# Patient Record
Sex: MALE | Race: WHITE | NOT HISPANIC OR LATINO | Employment: OTHER
[De-identification: names, ages, dates, MRNs, and addresses within clinical notes are randomized per-mention and may not be internally consistent; named-entity substitution may affect disease eponyms.]

---

## 2018-07-31 ENCOUNTER — TRANSCRIBE ORDERS (OUTPATIENT)
Dept: SCHEDULING | Age: 73
End: 2018-07-31

## 2018-07-31 DIAGNOSIS — G47.33 OBSTRUCTIVE SLEEP APNEA: Primary | ICD-10-CM

## 2018-08-28 ENCOUNTER — HOSPITAL ENCOUNTER (OUTPATIENT)
Dept: SLEEP MEDICINE | Facility: HOSPITAL | Age: 73
Discharge: HOME | End: 2018-08-28
Attending: OTOLARYNGOLOGY
Payer: MEDICARE

## 2018-08-28 DIAGNOSIS — G47.33 OBSTRUCTIVE SLEEP APNEA: ICD-10-CM

## 2018-08-28 PROCEDURE — 95810 POLYSOM 6/> YRS 4/> PARAM: CPT

## 2018-10-01 ENCOUNTER — TRANSCRIBE ORDERS (OUTPATIENT)
Dept: PULMONOLOGY | Facility: HOSPITAL | Age: 73
End: 2018-10-01

## 2018-10-01 ENCOUNTER — HOSPITAL ENCOUNTER (OUTPATIENT)
Dept: RADIOLOGY | Facility: HOSPITAL | Age: 73
Discharge: HOME | End: 2018-10-01
Attending: INTERNAL MEDICINE
Payer: MEDICARE

## 2018-10-01 DIAGNOSIS — R06.02 SHORTNESS OF BREATH: Primary | ICD-10-CM

## 2018-10-01 DIAGNOSIS — R06.02 SHORTNESS OF BREATH: ICD-10-CM

## 2018-10-01 PROCEDURE — 71046 X-RAY EXAM CHEST 2 VIEWS: CPT

## 2019-12-02 ENCOUNTER — TRANSCRIBE ORDERS (OUTPATIENT)
Dept: RADIOLOGY | Facility: HOSPITAL | Age: 74
End: 2019-12-02

## 2019-12-02 ENCOUNTER — HOSPITAL ENCOUNTER (OUTPATIENT)
Dept: RADIOLOGY | Facility: HOSPITAL | Age: 74
Discharge: HOME | End: 2019-12-02
Attending: INTERNAL MEDICINE
Payer: MEDICARE

## 2019-12-02 DIAGNOSIS — R05.9 COUGH: ICD-10-CM

## 2019-12-02 DIAGNOSIS — R05.9 COUGH: Primary | ICD-10-CM

## 2019-12-02 PROCEDURE — 71046 X-RAY EXAM CHEST 2 VIEWS: CPT

## 2020-09-23 ENCOUNTER — TELEPHONE (OUTPATIENT)
Dept: SCHEDULING | Facility: CLINIC | Age: 75
End: 2020-09-23

## 2020-09-24 NOTE — TELEPHONE ENCOUNTER
Ref by Dr Hercules, no cardiac issues , no prior cardiology, this is a cardiac eval and would like to be seen soon  Not available 9/28    Please advise

## 2020-09-29 ENCOUNTER — OFFICE VISIT (OUTPATIENT)
Dept: CARDIOLOGY | Facility: CLINIC | Age: 75
End: 2020-09-29
Payer: MEDICARE

## 2020-09-29 VITALS
HEART RATE: 80 BPM | HEIGHT: 73 IN | DIASTOLIC BLOOD PRESSURE: 78 MMHG | SYSTOLIC BLOOD PRESSURE: 134 MMHG | BODY MASS INDEX: 37.64 KG/M2 | WEIGHT: 284 LBS

## 2020-09-29 DIAGNOSIS — E78.5 HYPERLIPIDEMIA, UNSPECIFIED HYPERLIPIDEMIA TYPE: ICD-10-CM

## 2020-09-29 DIAGNOSIS — G47.33 OSA ON CPAP: ICD-10-CM

## 2020-09-29 DIAGNOSIS — R73.03 PREDIABETES: ICD-10-CM

## 2020-09-29 DIAGNOSIS — E66.812 CLASS 2 OBESITY WITHOUT SERIOUS COMORBIDITY WITH BODY MASS INDEX (BMI) OF 37.0 TO 37.9 IN ADULT, UNSPECIFIED OBESITY TYPE: ICD-10-CM

## 2020-09-29 DIAGNOSIS — Z91.89 AT RISK FOR CORONARY ARTERY DISEASE: Primary | ICD-10-CM

## 2020-09-29 PROBLEM — Z71.89 ENCOUNTER FOR CARDIAC RISK COUNSELING: Status: ACTIVE | Noted: 2020-09-29

## 2020-09-29 PROCEDURE — 93000 ELECTROCARDIOGRAM COMPLETE: CPT | Performed by: INTERNAL MEDICINE

## 2020-09-29 PROCEDURE — 99204 OFFICE O/P NEW MOD 45 MIN: CPT | Performed by: INTERNAL MEDICINE

## 2020-09-29 RX ORDER — ASPIRIN 81 MG/1
81 TABLET ORAL DAILY
Qty: 90 TABLET | Refills: 3
Start: 2020-09-29 | End: 2021-12-15 | Stop reason: ALTCHOICE

## 2020-09-29 RX ORDER — FLUTICASONE FUROATE AND VILANTEROL TRIFENATATE 200; 25 UG/1; UG/1
1 POWDER RESPIRATORY (INHALATION) AS NEEDED
COMMUNITY
Start: 2020-09-22

## 2020-09-29 RX ORDER — METFORMIN HYDROCHLORIDE 500 MG/1
TABLET ORAL
COMMUNITY
Start: 2020-08-30 | End: 2021-02-04 | Stop reason: SDUPTHER

## 2020-09-29 RX ORDER — MELOXICAM 7.5 MG/1
7.5 TABLET ORAL
COMMUNITY
Start: 2020-09-21 | End: 2020-12-30 | Stop reason: DRUGHIGH

## 2020-09-29 RX ORDER — ALBUTEROL SULFATE 90 UG/1
INHALANT RESPIRATORY (INHALATION)
COMMUNITY
Start: 2020-09-22

## 2020-09-29 RX ORDER — ATORVASTATIN CALCIUM 20 MG/1
20 TABLET, FILM COATED ORAL
COMMUNITY
Start: 2020-09-18

## 2020-09-29 RX ORDER — TRAZODONE HYDROCHLORIDE 50 MG/1
TABLET ORAL AS NEEDED
COMMUNITY
Start: 2020-09-22 | End: 2022-10-27

## 2020-09-29 ASSESSMENT — ENCOUNTER SYMPTOMS
SORE THROAT: 0
VOMITING: 0
HEARTBURN: 0
SNORING: 0
PALPITATIONS: 0
WEIGHT GAIN: 1
FREQUENCY: 0
ABDOMINAL PAIN: 0
ORTHOPNEA: 0
DIZZINESS: 0
BLOATING: 0
PND: 0
NERVOUS/ANXIOUS: 0
BLURRED VISION: 0
COUGH: 0
DEPRESSION: 0
MYALGIAS: 0
NAUSEA: 0
SYNCOPE: 0
MEMORY LOSS: 0
BACK PAIN: 0

## 2020-09-29 NOTE — LETTER
September 29, 2020     Kamran Wilson MD  [address]  [place]  [facility] [address]    Patient: Sudheer Mayorga  YOB: 1945  Date of Visit: 9/29/2020      Dear Dr. Wilson:    Thank you for referring Sudheer Mayorga to me for evaluation. Below are my notes for this consultation.    If you have questions, please do not hesitate to call me. I look forward to following your patient along with you.         Sincerely,        Declan Gray MD        CC: MD Isaac Horan, Declan BEAN MD  9/29/2020 11:55 PM  Sign when Signing Visit     Cardiology Note          HPI   Sudheer Mayorga is a 75 y.o. male who presents for cardiovascular assessment.    He is a pleasant 75 y.o. with a history of prediabetes, dyslipidemia, sleep apnea on CPAP and morbid obesity who wishes to establish care and advice on reducing cardiovascular risk.  At the present time he does not formally exercise.  He does tell me that when he has occasion to walk a few blocks or up a flight of stairs he can do so without any dyspnea.  He does not get chest pain with exertion.  He tells me he is battled weight loss his adult life.  He is tried numerous diets with only modest success before he stops.  He had recently got up to 295 pounds and improved his eating to come down to 285 pounds at present.  His weight has fluctuated between 275 and 295 pounds in recent years.  He has considered bariatric surgery and asked my opinion on this.  We discussed in all out effort short of this prior to considering but that could potentially be an option even at his age.  He has chronic tendinitis of his right knee but feels it would not prevent him from doing a daily walking program.  He also recognizes it may improve from losing weight.  Denies chest pain, shortness of breath at rest, palpitations, orthopnea, paroxysmal nocturnal dyspnea, presyncope, syncope.      Past Medical History:   Diagnosis Date   • Allergic rhinitis    • Asthma    •  Depression    • Knee tendinitis     right   • Lipid disorder    • HONEY on CPAP    • Prediabetes      Past Surgical History:   Procedure Laterality Date   • HERNIA REPAIR     • ROTATOR CUFF REPAIR         SOCIAL HISTORY   reports that he has never smoked. He has never used smokeless tobacco. He reports current alcohol use. He reports that he does not use drugs.   4 daughters ages 38-44 as of . .    Family Status   Relation Name Status   • Bio Mother   at age 91        CVA at 88   • Bio Father   at age 92        DM onset 81yo        ALLERGIES  Patient has no known allergies.      Outpatient Encounter Medications as of 2020:   •  albuterol HFA (VENTOLIN HFA) 90 mcg/actuation inhaler, INHALE 1 PUFF BY MOUTH EVERY 4 HOURS AS NEEDED  •  atorvastatin (LIPITOR) 20 mg tablet, Take 20 mg by mouth once daily.  •  BREO ELLIPTA 200-25 mcg/dose blister with device powder for inhalation, as needed.    •  meloxicam (MOBIC) 7.5 mg tablet, Take 7.5 mg by mouth once daily.  •  metFORMIN (GLUCOPHAGE) 500 mg tablet, TAKE 1 TABLET BY MOUTH EVERY MORNING BEFORE BREAKFAST.  •  traZODone (DESYREL) 50 mg tablet, Take by mouth as needed.    •  aspirin (ASPIR-81) 81 mg enteric coated tablet, Take 1 tablet (81 mg total) by mouth daily.    Review of Systems   Constitution: Positive for weight gain (5 pounds).   HENT: Negative for hearing loss and sore throat.    Eyes: Negative for blurred vision.   Cardiovascular: Negative for chest pain, orthopnea, palpitations, paroxysmal nocturnal dyspnea and syncope.   Respiratory: Negative for cough and snoring.    Endocrine: Negative for polyuria.   Skin: Negative for rash.   Musculoskeletal: Negative for arthritis, back pain, joint pain and myalgias.   Gastrointestinal: Negative for bloating, abdominal pain, heartburn, nausea and vomiting.   Genitourinary: Negative for frequency.   Neurological: Negative for dizziness.   Psychiatric/Behavioral: Negative for depression and  "memory loss. The patient is not nervous/anxious.      Objective   Visit Vitals  /78   Pulse 80   Ht 1.854 m (6' 1\")   Wt 129 kg (284 lb)   BMI 37.47 kg/m²       Wt Readings from Last 3 Encounters:   09/29/20 129 kg (284 lb)       Physical Exam   Constitutional: He is oriented to person, place, and time. He appears well-developed.   HENT:   Head: Normocephalic and atraumatic.   Eyes: EOM are normal. No scleral icterus.   Neck: No JVD present.   Cardiovascular: Normal rate, regular rhythm and normal heart sounds.   Pulmonary/Chest: Breath sounds normal.   Abdominal: Soft. He exhibits no distension. There is no abdominal tenderness.   Musculoskeletal: Normal range of motion.         General: No edema.   Neurological: He is alert and oriented to person, place, and time.   Skin: Skin is warm and dry.   Psychiatric: He has a normal mood and affect. Judgment normal.   Vitals reviewed.      No results found for: GLUCOSE, CALCIUM, NA, K, CO2, CL, BUN, CREATININE  No results found for: ALT, AST, GGT, ALKPHOS, BILITOT  No results found for: WBC, HGB, HCT, MCV, PLT  No results found for: TSH  No results found for: CHOL  No results found for: HDL  No results found for: LDLCALC  No results found for: TRIG  No results found for: CHOLHDL  No results found for: HGBA1C    Labs 5/26/2020:  Chol 141, HDL 42, trig 79, LDL 83    Labs 9/1/2020:  BUN 18, creat 0.92, na 141, k4.3, normal LFTs. TSH 1.56. BqpN6s=1.9.      EKG    Performed on 09/29/2020 and personally reviewed:  Sinus  Rhythm at 84bpm   -Left axis.     Imaging/Testing    PFTs March 2020:  Mild restriction    Sleep Study 2018  AHI 72.    ASSESSMENT AND PLAN    1. Cardiovascular exam. His cardiovascular exam including heart and carotids are normal today. His EKG shows minor abnormalities of left axis deviation and borderline low voltage common in obesity. His lipids are acceptable on Atorvastatin as is his A1C on Metformin. We discussed weight loss would go a long way in " reducing his risk factors for CV disease. I encouraged starting a regular exercise program. Due to good activity tolerance without CV symptoms I have not ordered stress testing for now.    2. Obesity. We discussed how much of his medical issues would be eliminated or improved with weight loss including his prediabetes, dyslipidemia and sleep apnea. He has tried multiple diets and only lost modest amounts before stopping and gaining the weight back. His weight has generally been 275-295 pounds in the last several years. I discussed referral to a physician specializing in obesity and he is agreeable. I therefore referred him to Dr. Garcia. In the meantime we discussed 30 minutes of moderate exercise daily in the form of walking and dietary changes for the interim.    3. Dyslipidemia. Well controlled on Atorvatstatin 20 mg Daily.    4. HONEY. Reports AHI went from 72 to 7 with treatment for which he remains compliant with. Follows with Dr. Hercules.    5. Prediabtes. Controlled with Metformin. We discussed weight loss.        Thank you for allowing me to participate in the care of this patient. I will plan to see him in 1 year.  Please do not hesitate to contact me with any questions or concerns.    Sincerely,  Declan Gray MD  9/29/2020

## 2020-09-29 NOTE — PROGRESS NOTES
Cardiology Note          HPI   Sudheer Mayorga is a 75 y.o. male who presents for cardiovascular assessment.    He is a pleasant 75 y.o. with a history of prediabetes, dyslipidemia, sleep apnea on CPAP and morbid obesity who wishes to establish care and advice on reducing cardiovascular risk.  At the present time he does not formally exercise.  He does tell me that when he has occasion to walk a few blocks or up a flight of stairs he can do so without any dyspnea.  He does not get chest pain with exertion.  He tells me he is battled weight loss his adult life.  He is tried numerous diets with only modest success before he stops.  He had recently got up to 295 pounds and improved his eating to come down to 285 pounds at present.  His weight has fluctuated between 275 and 295 pounds in recent years.  He has considered bariatric surgery and asked my opinion on this.  We discussed in all out effort short of this prior to considering but that could potentially be an option even at his age.  He has chronic tendinitis of his right knee but feels it would not prevent him from doing a daily walking program.  He also recognizes it may improve from losing weight.  Denies chest pain, shortness of breath at rest, palpitations, orthopnea, paroxysmal nocturnal dyspnea, presyncope, syncope.      Past Medical History:   Diagnosis Date   • Allergic rhinitis    • Asthma    • Depression    • Knee tendinitis     right   • Lipid disorder    • HONEY on CPAP    • Prediabetes      Past Surgical History:   Procedure Laterality Date   • HERNIA REPAIR     • ROTATOR CUFF REPAIR         SOCIAL HISTORY   reports that he has never smoked. He has never used smokeless tobacco. He reports current alcohol use. He reports that he does not use drugs.   4 daughters ages 38-44 as of 2020. .    Family Status   Relation Name Status   • Bio Mother   at age 91        CVA at 88   • Bio Father   at age 92        DM onset 81yo     "    ALLERGIES  Patient has no known allergies.      Outpatient Encounter Medications as of 9/29/2020:   •  albuterol HFA (VENTOLIN HFA) 90 mcg/actuation inhaler, INHALE 1 PUFF BY MOUTH EVERY 4 HOURS AS NEEDED  •  atorvastatin (LIPITOR) 20 mg tablet, Take 20 mg by mouth once daily.  •  BREO ELLIPTA 200-25 mcg/dose blister with device powder for inhalation, as needed.    •  meloxicam (MOBIC) 7.5 mg tablet, Take 7.5 mg by mouth once daily.  •  metFORMIN (GLUCOPHAGE) 500 mg tablet, TAKE 1 TABLET BY MOUTH EVERY MORNING BEFORE BREAKFAST.  •  traZODone (DESYREL) 50 mg tablet, Take by mouth as needed.    •  aspirin (ASPIR-81) 81 mg enteric coated tablet, Take 1 tablet (81 mg total) by mouth daily.    Review of Systems   Constitution: Positive for weight gain (5 pounds).   HENT: Negative for hearing loss and sore throat.    Eyes: Negative for blurred vision.   Cardiovascular: Negative for chest pain, orthopnea, palpitations, paroxysmal nocturnal dyspnea and syncope.   Respiratory: Negative for cough and snoring.    Endocrine: Negative for polyuria.   Skin: Negative for rash.   Musculoskeletal: Negative for arthritis, back pain, joint pain and myalgias.   Gastrointestinal: Negative for bloating, abdominal pain, heartburn, nausea and vomiting.   Genitourinary: Negative for frequency.   Neurological: Negative for dizziness.   Psychiatric/Behavioral: Negative for depression and memory loss. The patient is not nervous/anxious.      Objective   Visit Vitals  /78   Pulse 80   Ht 1.854 m (6' 1\")   Wt 129 kg (284 lb)   BMI 37.47 kg/m²       Wt Readings from Last 3 Encounters:   09/29/20 129 kg (284 lb)       Physical Exam   Constitutional: He is oriented to person, place, and time. He appears well-developed.   HENT:   Head: Normocephalic and atraumatic.   Eyes: EOM are normal. No scleral icterus.   Neck: No JVD present.   Cardiovascular: Normal rate, regular rhythm and normal heart sounds.   Pulmonary/Chest: Breath sounds " normal.   Abdominal: Soft. He exhibits no distension. There is no abdominal tenderness.   Musculoskeletal: Normal range of motion.         General: No edema.   Neurological: He is alert and oriented to person, place, and time.   Skin: Skin is warm and dry.   Psychiatric: He has a normal mood and affect. Judgment normal.   Vitals reviewed.      No results found for: GLUCOSE, CALCIUM, NA, K, CO2, CL, BUN, CREATININE  No results found for: ALT, AST, GGT, ALKPHOS, BILITOT  No results found for: WBC, HGB, HCT, MCV, PLT  No results found for: TSH  No results found for: CHOL  No results found for: HDL  No results found for: LDLCALC  No results found for: TRIG  No results found for: CHOLHDL  No results found for: HGBA1C    Labs 5/26/2020:  Chol 141, HDL 42, trig 79, LDL 83    Labs 9/1/2020:  BUN 18, creat 0.92, na 141, k4.3, normal LFTs. TSH 1.56. BdsS4z=2.9.      EKG    Performed on 09/29/2020 and personally reviewed:  Sinus  Rhythm at 84bpm   -Left axis.     Imaging/Testing    PFTs March 2020:  Mild restriction    Sleep Study 2018  AHI 72.    ASSESSMENT AND PLAN    1. Cardiovascular exam. His cardiovascular exam including heart and carotids are normal today. His EKG shows minor abnormalities of left axis deviation and borderline low voltage common in obesity. His lipids are acceptable on Atorvastatin as is his A1C on Metformin. We discussed weight loss would go a long way in reducing his risk factors for CV disease. I encouraged starting a regular exercise program. Due to good activity tolerance without CV symptoms I have not ordered stress testing for now.    2. Obesity. We discussed how much of his medical issues would be eliminated or improved with weight loss including his prediabetes, dyslipidemia and sleep apnea. He has tried multiple diets and only lost modest amounts before stopping and gaining the weight back. His weight has generally been 275-295 pounds in the last several years. I discussed referral to a  physician specializing in obesity and he is agreeable. I therefore referred him to Dr. Garcia. In the meantime we discussed 30 minutes of moderate exercise daily in the form of walking and dietary changes for the interim.    3. Dyslipidemia. Well controlled on Atorvatstatin 20 mg Daily.    4. HONEY. Reports AHI went from 72 to 7 with treatment for which he remains compliant with. Follows with Dr. Hercules.    5. Prediabtes. Controlled with Metformin. We discussed weight loss.        Thank you for allowing me to participate in the care of this patient. I will plan to see him in 1 year.  Please do not hesitate to contact me with any questions or concerns.    Sincerely,  Declan Gray MD  9/29/2020

## 2020-10-16 ENCOUNTER — OFFICE VISIT (OUTPATIENT)
Dept: BARIATRICS/WEIGHT MGMT | Facility: CLINIC | Age: 75
End: 2020-10-16
Payer: MEDICARE

## 2020-10-16 VITALS
DIASTOLIC BLOOD PRESSURE: 80 MMHG | WEIGHT: 287 LBS | BODY MASS INDEX: 38.04 KG/M2 | SYSTOLIC BLOOD PRESSURE: 142 MMHG | RESPIRATION RATE: 16 BRPM | HEIGHT: 73 IN | OXYGEN SATURATION: 95 % | HEART RATE: 72 BPM

## 2020-10-16 DIAGNOSIS — E66.01 CLASS 2 SEVERE OBESITY WITH SERIOUS COMORBIDITY AND BODY MASS INDEX (BMI) OF 38.0 TO 38.9 IN ADULT, UNSPECIFIED OBESITY TYPE (CMS/HCC): ICD-10-CM

## 2020-10-16 DIAGNOSIS — E78.2 MIXED HYPERLIPIDEMIA: ICD-10-CM

## 2020-10-16 DIAGNOSIS — E66.812 CLASS 2 SEVERE OBESITY WITH SERIOUS COMORBIDITY AND BODY MASS INDEX (BMI) OF 38.0 TO 38.9 IN ADULT, UNSPECIFIED OBESITY TYPE (CMS/HCC): ICD-10-CM

## 2020-10-16 DIAGNOSIS — G47.33 SLEEP APNEA, OBSTRUCTIVE: ICD-10-CM

## 2020-10-16 DIAGNOSIS — R73.03 PREDIABETES: Primary | ICD-10-CM

## 2020-10-16 PROCEDURE — 99205 OFFICE O/P NEW HI 60 MIN: CPT | Performed by: FAMILY MEDICINE

## 2020-10-16 RX ORDER — NALTREXONE HYDROCHLORIDE AND BUPROPION HYDROCHLORIDE 8; 90 MG/1; MG/1
TABLET, EXTENDED RELEASE ORAL
Qty: 70 TABLET | Refills: 0 | Status: SHIPPED | OUTPATIENT
Start: 2020-10-16 | End: 2020-10-16 | Stop reason: CLARIF

## 2020-10-16 RX ORDER — UBIDECARENONE 100 MG
100 CAPSULE ORAL DAILY
COMMUNITY

## 2020-10-16 RX ORDER — BUPROPION HYDROCHLORIDE 150 MG/1
150 TABLET ORAL DAILY
Qty: 90 TABLET | Refills: 0 | Status: SHIPPED | OUTPATIENT
Start: 2020-10-16 | End: 2021-01-08

## 2020-10-16 RX ORDER — NALTREXONE HYDROCHLORIDE 50 MG/1
50 TABLET, FILM COATED ORAL DAILY
Qty: 90 TABLET | Refills: 0 | Status: SHIPPED | OUTPATIENT
Start: 2020-10-16 | End: 2020-10-26 | Stop reason: DRUGHIGH

## 2020-10-16 ASSESSMENT — ENCOUNTER SYMPTOMS
WEAKNESS: 0
HYPERACTIVE: 0
VOICE CHANGE: 0
PALPITATIONS: 0
APPETITE CHANGE: 0
ARTHRALGIAS: 0
BLOOD IN STOOL: 0
DIARRHEA: 0
ABDOMINAL DISTENTION: 0
NUMBNESS: 0
ADENOPATHY: 0
POLYPHAGIA: 0
UNEXPECTED WEIGHT CHANGE: 0
APNEA: 0
BACK PAIN: 0
NAUSEA: 0
FATIGUE: 1
TREMORS: 0
DYSPHORIC MOOD: 0
HEMATURIA: 0
DIFFICULTY URINATING: 0
WHEEZING: 0
BRUISES/BLEEDS EASILY: 0
CONSTIPATION: 0
FREQUENCY: 0
SLEEP DISTURBANCE: 0
NERVOUS/ANXIOUS: 0
FLANK PAIN: 0
CHEST TIGHTNESS: 0
POLYDIPSIA: 0
DECREASED CONCENTRATION: 0
ALLERGIC/IMMUNOLOGIC COMMENTS: DENIES FREQUENT INFECTIONS, HISTORY OF MAJOR INFECTIONS OR FREQUENT ANTIBIOTIC USE
JOINT SWELLING: 0
COLOR CHANGE: 0
ACTIVITY CHANGE: 1
HEADACHES: 0
COUGH: 0
EYE PAIN: 0
TROUBLE SWALLOWING: 0
VOMITING: 0
SEIZURES: 0
MYALGIAS: 0
PHOTOPHOBIA: 0
DIZZINESS: 0
WOUND: 0
SHORTNESS OF BREATH: 0
LIGHT-HEADEDNESS: 0
SPEECH DIFFICULTY: 0
CHOKING: 0
ABDOMINAL PAIN: 0

## 2020-10-16 NOTE — PATIENT INSTRUCTIONS
Problem List Items Addressed This Visit        Respiratory    Sleep apnea, obstructive     There is a  cyclical relationship between sleep apnea and excess body weight.  Excess body weight often contributes to sleep apnea and a 10% body weight decrease can result in 30% less apneic episodes. Untreated sleep apnea and its resulting sleep deprived state can be extremely physically stressful and can lead to hypertension, atrial fibrillation, and mood disorders. This stress can also worsen insulin resistance and make weight loss more difficult.  It is very important to sleep well, and I reinforced that is very important to follow outlined medical treatments.               Endocrine/Metabolic    Prediabetes - Primary     Continue on metformin at current dose.    We discussed today that without change in lifestyle prediabetes will progress to diabetes over time.  Continuing to have the same eating plan, level of nutrition, and lifestyle will not result in stable blood sugars over time.  Doing the same thing will result in worsening of the condition.  The only way to stop progression or produce remission of prediabetes is to eat a whole foods based diet, low in simple carbohydrates, get adequate physical activity, manage stress, and eat only in response to hunger.  Certain medications are also available to help with this process.           Mixed hyperlipidemia     Continue on current dose of atorvastatin.    Elevated cholesterol levels often come from a blend of genetic and environmental factors.  Maximizing nutrition and physical activity is an important adjunct to medical management to treat elevated cholesterol levels and reduce the risk of arthrosclerotic disease such as heart attacks, strokes, and peripheral arterial disease.           Class 2 severe obesity with serious comorbidity and body mass index (BMI) of 38.0 to 38.9 in adult (CMS/[facility])     Weight loss through a multifaceted approach addressing metabolic  "factors, nutrition, physical activity, and mental well being will help this patient improve or resolve the following conditions related to mechanical forces of weight: Joint pain and sleep apnea, as well as the following conditions related to metabolic and inflammatory processes of excess and dysfunctional adipose tissue or \"adiposopathy\": Prediabetes and hyperlipidemia.     Understand that weight loss through sustainable changes will probably not provide quick results, but will provide lasting results.  Regaining rapidly lost weight is more damaging than never loosing it at all.      Today we discussed that weight balance is a complex process with many possible risk factors that far exceed the traditional concept of caloric balance. We identified your specific risk factors, and began a  plan to make sustainable life changes.  Lasting change come from new habit formation.  This takes time and consistent effort.      After today's visit, we determined that contributing factors to the above weight related conditions include:    Insulin (prediabetes), cortisol (stress with insufficient relaxation), obstructive sleep apnea, inflammation (asthma history), food portions, food type, artificial sweeteners, insufficient water intake, nonhunger eating (quickly, finisher, emotional, procrastinator, refuse not, distracted), constant hunger, food cravings, probable binge eating    We discussed targets for appropriate nutrition, including food timing, amount, and type.    Food timing: 3 meals and 2 snacks    Food types: Whole unprocessed or minimally processed foods.    Long-term target  Food Quantity goals in 1 day:  4+ non-starchy (low glycemic index) vegetables    3 Moderate glycemic index fruit or vegetable choices   2 Higher glycemic index food choices   6 Protein choices   6 Fat choices    1 off plan items per week    Food Teamwork: Always pair a Moderate or High Glycemic Load choice with a Healthy fat --- No naked " carbs!    Today we outlined the following beginning steps to put goals into action:    Take some decision making out of the picture through using a blended program of Nutrisystem with a well-balanced home-cooked meal for dinner.  He will use Nutrisystem for breakfast and lunch as well as a snack during the day and 1 after dinner.  He will eat a balanced dinner consisting of 2 protein choices, 2 fat choices, 1 starch choice, and to vegetable choices.    We discussed Contrave and he agreed to a trial of medical treatment, however his insurance would not cover this medication.  We then decided to use the medication broken apart into Wellbutrin 150 mg in the morning and naltrexone one half tab in the afternoon.    Patient will initiate Wellbutrin in the morning as it can be activating and interfere with sleep if taken later in the day.  Most common side effects of Wellbutrin include mild headache, mild nausea, and feeling of jitteriness.  Oftentimes the side effects will resolve within the first week, and if mild, please continue the medication as the symptoms will likely go away.  If you notice anything more severe please contact the office for instruction.    Naltrexone is generally well-tolerated.  Needs to be taken as a half tab once daily, generally at lunchtime.  You must avoid alcohol, opiates and excessive sugar 4-5 days prior to initiating the dose or you can feel symptoms of withdrawal such as nausea, diarrhea, headache, sweats.      Supplement recommendations:     Probiotics: Consume foods with probiotics or take a probiotic daily (like [employer]'s Adult Probiotic, VSL-3, Florastor, or Culturelle).   Omega-3: Eat 2 servings of fish per week or take a good quality omega -3 supplement (TARGET BRAZIL - sold here, [place] [employer], [place])  Vitamin D 3: 2,000 IU daily (or more if deficient) with a meal containing fat   Others: New with co-Q10, Osteo Bi-Flex, magnesium, and multivitamin.    Initiate and maintain food  journal: Topic Handout provided today.    Bring your food journal to every visit.  We will adjust or continue your plan based on review of this data.      Create a list of advantages that will result from making lifestyle changes and a list of obstacles that may present challenges along the way.     Commit to follow up --- loose, gain or stay the same.  The only way to fail is to quit.  Change will happen if you continue to manage and modify your plan.  We are here to help you do that no matter what happens in the interim.     TEN RULES for living a healthy life and maintaining a healthy weight:       Eat only in response to hunger, and stop when you are no longer hungry.  Recognize cues and emotions that lead to non-hunger eating, and manage them accordingly.     Drink unsweetened beverages and aim to get 6-8 glasses of water daily.                             Avoid regular use of ultra-processed foods, added nitrates or nitrites, sweetened beverages, and artifical sweeteners (sucralose, splenda, equal, sweet-n-low), as they dramatically increase insulin and  worsen insulin resistance. Insulin resistance is a the most common risk factor for weight dysregulation in the [place].     Avoid unnecessary antibiotics in your own self and in the meats and dairy products you use.    Limit alcohol to less than 3 servings in a week.    Achieve adequate NEAT (Non-Exercise Activity Thermogenesis) every day: hourly movement  while awake for at least 250 steps (approximately 5 minutes out of each hour).  Hourly movement prevents metabolic slow down that can happen with prolonged sitting.     Participate in regular dedicated physical activity  --- at least 150 minutes per week during active weight loss and increasing to 300 minutes per week in the weight maintenance phase.  Make at least 60 of those physical activity minutes resistance training.     Develop mindful eating practices : Eat sitting down, preferably at a table.   Eat your food in a reflective, peaceful setting or in enjoyable company, not in front of a screen, behind the wheel of a car or in the grocery store aisle.   Chew Slowly and thoroughly -- chewing the 1st stage of digestion!     Identify personal stressors.  Modify what you can, and aim to balance what you cannot. Develop and practice daily relaxation techniques to balance stress.  Make time for rejuvenating activities and hobbies at least weekly.     Get adequate and restful sleep every night ---  7.5-8.5 hours a night is what most people require.  Consider a scheduled sleep and wake time if possible.       In Conclusion:     Our plan of action includes frequent visits over the next year to focus on education regarding the appropriate nutrition and physical activity best suited to our patient's individual needs, SMART goal setting and other strategies for lifestyle change, mindfulness, stress reduction,  accountability, and review of any initiated medical interventions.  Visits will space out over time with formation of new life habits and stabilization of the treatment plan. We will enlist the services of the nutritionist, and may in the future consult with exercise physiology or emotional wellness.  Weekly educational classes are also available for extra support, if needed.    Over half of today's 60 minute visit was spent in education and counseling regarding  nutrition, physical activity, metabolism, behavioral modifications, and weight loss goals and benefits as an adjunctive treatment to traditional medications and medical practices for treatment of chronic diseases related to excess weight.

## 2020-10-16 NOTE — ASSESSMENT & PLAN NOTE
Continue on metformin at current dose.    We discussed today that without change in lifestyle prediabetes will progress to diabetes over time.  Continuing to have the same eating plan, level of nutrition, and lifestyle will not result in stable blood sugars over time.  Doing the same thing will result in worsening of the condition.  The only way to stop progression or produce remission of prediabetes is to eat a whole foods based diet, low in simple carbohydrates, get adequate physical activity, manage stress, and eat only in response to hunger.  Certain medications are also available to help with this process.

## 2020-10-16 NOTE — PROGRESS NOTES
I spoke with Mr. Mayorga and explained that Contrave is not covered by his insurance. He would like to take the Wellbutrin and Naltrexone separately. I explained the indications and side effects of both medications to him. I asked him to start with the Wellbutrin for a few days and then add the naltrexone but he wants to start both tomorrow. He also does not want to split the naltrexone in half. He was instructed to call me with any side effects.

## 2020-10-16 NOTE — PROGRESS NOTES
DETAILS OF CONSULTATION     Consulting Service:  [facility]    Referring Physician: No ref. provider found    Reason for Consultation:   Chief Complaint   Patient presents with   • Weight Loss        HISTORY OF PRESENT ILLNESS        Sudheer Mayorga is a 75 y.o. male with prediabetes, asthma, and sleep apnea here to discuss how weight loss through optimized nutrition, physical activity, and behavior modification can improve weight related conditions.      Sudheer Mayorga identifies a personal healthy weight as 250 pounds.     Motivation for appointment: His weight is starting to affect his health.  His daughters will not let him get bypass surgery.    Status of weight related conditions, relevant history, and new concerns:    Prediabetes: Patient is currently on Metformin, his last A1c was 5.9.  He eats a diet very high in simple carbohydrates including bananas, fried matza, breads and buns, and breaded meats.    Sleep apnea: Patient reports that he uses his CPAP machine nightly.  He sleeps between 5 to 6 hours a night.  He reports that sleep is restful    Knee pain: Patient reports the knee pain prevents him from exercising regularly.    Hyperlipidemia: Currently taking atorvastatin daily.  He takes his medication in the morning.    Potentially weight positive medications: None    Lifetime weight trends:     Onset of difficulty with weight regulation: He reports having difficulty with his weight since  when he was approximately 42 years old.  He reports that at some point he reached 300 pounds.  At the age of 62 he became a  and lost 100 pounds after his wife .  He is slowly regained back up to his 295 pounds status.  He used Nutrisystem in 2019 and lost 25 pounds.  He stopped doing the program because it was hard to stick with because he would go out to dinner frequently and not want to eat the Nutrisystem dinners.    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    Nutrition: Burt eats 3 meals a day and has 1 snack daily  24 hour food recall:  Breakfast: Fried matza  Lunch: 2 hotdogs and buns  Dinner: Chicken Parmesan  Snacks: 1 banana  Beverages: Lots of diet Snapple    Physical activity: Sedentary work and no dedicated physical activity    Sleep and stress: Moderate stress, insufficient sleep, uses CPAP nightly.     I have reviewed the patient's past medical and surgical history, family history, psychiatric, and social history.     Pertinent negative history:   Patient denies history of anorexia, bulimia, addiction, ADHD, seizures, pancreatitis,  Coronary Artery Disease, valvular disease, cardiomyopathy, arrhythmias, chronic renal disease, nephrolithiasis, disorders of electrolyte imbalance    Pertinent positive history not already mentioned: none    See additional details from intake questionnaire in scanned documents under the media tab.     PAST MEDICAL AND SURGICAL HISTORY        Past Medical History:   Diagnosis Date   • Allergic rhinitis    • Asthma    • Depression    • Knee tendinitis     right   • Lipid disorder    • HONEY on CPAP    • Prediabetes        Past Surgical History:   Procedure Laterality Date   • HERNIA REPAIR     • ROTATOR CUFF REPAIR         PCP: Kamran Wilson MD    MEDICATIONS          Current Outpatient Medications:   •  albuterol HFA (VENTOLIN HFA) 90 mcg/actuation inhaler, INHALE 1 PUFF BY MOUTH EVERY 4 HOURS AS NEEDED, Disp: , Rfl:   •  aspirin (ASPIR-81) 81 mg enteric coated tablet, Take 1 tablet (81 mg total) by mouth daily., Disp: 90 tablet, Rfl: 3  •  atorvastatin (LIPITOR) 20 mg tablet, Take 20 mg by mouth once daily., Disp: , Rfl:   •  BREO ELLIPTA 200-25 mcg/dose blister with device powder for inhalation, as needed.  , Disp: , Rfl:   •  coenzyme Q10 (COQ10) 100 mg capsule, Take 100 mg by  mouth daily., Disp: , Rfl:   •  meloxicam (MOBIC) 7.5 mg tablet, Take 7.5 mg by mouth once daily., Disp: , Rfl:   •  metFORMIN (GLUCOPHAGE) 500 mg tablet, TAKE 1 TABLET BY MOUTH EVERY MORNING BEFORE BREAKFAST., Disp: , Rfl:   •  multivit-min/folic/vit K/lycop (ONE-A-DAY MEN'S MULTIVITAMIN ORAL), Take by mouth., Disp: , Rfl:   •  traZODone (DESYREL) 50 mg tablet, Take by mouth as needed.  , Disp: , Rfl:   •  buPROPion XL (WELLBUTRIN XL) 150 mg 24 hr tablet, Take 1 tablet (150 mg total) by mouth daily., Disp: 90 tablet, Rfl: 0  •  naltrexone (DEPADE) 50 mg tablet, Take 1 tablet (50 mg total) by mouth daily. Take at lunchtime, Disp: 90 tablet, Rfl: 0    ALLERGIES        Patient has no known allergies.    FAMILY HISTORY        Family History   Problem Relation Age of Onset   • Stroke Biological Mother    • Diabetes Biological Father        SOCIAL/ TOBACCO HISTORY        Social History     Tobacco Use   • Smoking status: Never Smoker   • Smokeless tobacco: Never Used   Substance Use Topics   • Alcohol use: Yes     Comment: social   • Drug use: Never     Social History     Social History Narrative    Live with partner.       REVIEW OF SYSTEMS      Review of Systems    Review of Systems   Constitutional: Positive for activity change and fatigue. Negative for appetite change and unexpected weight change.   HENT: Negative for dental problem, ear pain, hearing loss, mouth sores, trouble swallowing and voice change.    Eyes: Negative for photophobia, pain and visual disturbance.   Respiratory: Negative for apnea, cough, choking, chest tightness, shortness of breath and wheezing.    Cardiovascular: Negative for chest pain, palpitations and leg swelling.   Gastrointestinal: Negative for abdominal distention, abdominal pain, blood in stool, constipation, diarrhea, nausea and vomiting.   Endocrine: Negative for cold intolerance, heat intolerance, polydipsia, polyphagia and polyuria.   Genitourinary: Negative for difficulty  "urinating, flank pain, frequency, hematuria and urgency.   Musculoskeletal: Negative for arthralgias, back pain, gait problem, joint swelling and myalgias.   Skin: Negative for color change, rash and wound.   Allergic/Immunologic: Negative for environmental allergies, food allergies and immunocompromised state.        Denies frequent infections, history of major infections or frequent antibiotic use   Neurological: Negative for dizziness, tremors, seizures, syncope, speech difficulty, weakness, light-headedness, numbness and headaches.   Hematological: Negative for adenopathy. Does not bruise/bleed easily.   Psychiatric/Behavioral: Negative for decreased concentration, dysphoric mood and sleep disturbance. The patient is not nervous/anxious and is not hyperactive.         PHYSICAL EXAMINATION      Visit Vitals  BP (!) 142/80 (BP Location: Left upper arm, Patient Position: Sitting)   Pulse 72   Resp 16   Ht 1.842 m (6' 0.5\")   Wt 130 kg (287 lb)   SpO2 95%   BMI 38.39 kg/m²        Physical Exam  Constitutional:       General: He is not in acute distress.     Appearance: He is well-developed. He is not diaphoretic.   HENT:      Head: Normocephalic and atraumatic.      Right Ear: External ear normal.      Left Ear: External ear normal.      Nose: Nose normal.   Eyes:      Conjunctiva/sclera: Conjunctivae normal.      Pupils: Pupils are equal, round, and reactive to light.   Neck:      Musculoskeletal: Normal range of motion.   Pulmonary:      Effort: Pulmonary effort is normal. No respiratory distress.      Breath sounds: No stridor.   Skin:     General: Skin is dry.      Coloration: Skin is not pale.   Neurological:      Mental Status: He is alert and oriented to person, place, and time.      Cranial Nerves: No cranial nerve deficit.   Psychiatric:         Behavior: Behavior normal.         Thought Content: Thought content normal.         Judgment: Judgment normal.           LABS / IMAGING / EKG        Labs  I have " reviewed the patient's pertinent labs.  No results found for: HGBA1C  No results found for: CHOL  No results found for: HDL  No results found for: LDLCALC  No results found for: TRIG  No results found for: CHOLHDL  No results found for: TSH  No results found for: WBC, HGB, HCT, MCV, PLT  No results found for: NA, K, CL, CO2, BUN, CREATININE, GLUCOSE, AST, ALT, PROTEIN, ALBUMIN, BILITOT, EGFR, ANIONGAP   ASSESSMENT AND PLAN   Sleep apnea, obstructive  There is a  cyclical relationship between sleep apnea and excess body weight.  Excess body weight often contributes to sleep apnea and a 10% body weight decrease can result in 30% less apneic episodes. Untreated sleep apnea and its resulting sleep deprived state can be extremely physically stressful and can lead to hypertension, atrial fibrillation, and mood disorders. This stress can also worsen insulin resistance and make weight loss more difficult.  It is very important to sleep well, and I reinforced that is very important to follow outlined medical treatments.       Prediabetes  Continue on metformin at current dose.    We discussed today that without change in lifestyle prediabetes will progress to diabetes over time.  Continuing to have the same eating plan, level of nutrition, and lifestyle will not result in stable blood sugars over time.  Doing the same thing will result in worsening of the condition.  The only way to stop progression or produce remission of prediabetes is to eat a whole foods based diet, low in simple carbohydrates, get adequate physical activity, manage stress, and eat only in response to hunger.  Certain medications are also available to help with this process.      Mixed hyperlipidemia  Continue on current dose of atorvastatin.    Elevated cholesterol levels often come from a blend of genetic and environmental factors.  Maximizing nutrition and physical activity is an important adjunct to medical management to treat elevated cholesterol  "levels and reduce the risk of arthrosclerotic disease such as heart attacks, strokes, and peripheral arterial disease.      Class 2 severe obesity with serious comorbidity and body mass index (BMI) of 38.0 to 38.9 in adult (CMS/[facility])  Weight loss through a multifaceted approach addressing metabolic factors, nutrition, physical activity, and mental well being will help this patient improve or resolve the following conditions related to mechanical forces of weight: Joint pain and sleep apnea, as well as the following conditions related to metabolic and inflammatory processes of excess and dysfunctional adipose tissue or \"adiposopathy\": Prediabetes and hyperlipidemia.     Understand that weight loss through sustainable changes will probably not provide quick results, but will provide lasting results.  Regaining rapidly lost weight is more damaging than never loosing it at all.      Today we discussed that weight balance is a complex process with many possible risk factors that far exceed the traditional concept of caloric balance. We identified your specific risk factors, and began a  plan to make sustainable life changes.  Lasting change come from new habit formation.  This takes time and consistent effort.      After today's visit, we determined that contributing factors to the above weight related conditions include:    Insulin (prediabetes), cortisol (stress with insufficient relaxation), obstructive sleep apnea, inflammation (asthma history), food portions, food type, artificial sweeteners, insufficient water intake, nonhunger eating (quickly, finisher, emotional, procrastinator, refuse not, distracted), constant hunger, food cravings, probable binge eating    We discussed targets for appropriate nutrition, including food timing, amount, and type.    Food timing: 3 meals and 2 snacks    Food types: Whole unprocessed or minimally processed foods.    Long-term target  Food Quantity goals in 1 day:  4+ non-starchy " (low glycemic index) vegetables    3 Moderate glycemic index fruit or vegetable choices   2 Higher glycemic index food choices   6 Protein choices   6 Fat choices    1 off plan items per week    Food Teamwork: Always pair a Moderate or High Glycemic Load choice with a Healthy fat --- No naked carbs!    Today we outlined the following beginning steps to put goals into action:    Take some decision making out of the picture through using a blended program of Nutrisystem with a well-balanced home-cooked meal for dinner.  He will use Nutrisystem for breakfast and lunch as well as a snack during the day and 1 after dinner.  He will eat a balanced dinner consisting of 2 protein choices, 2 fat choices, 1 starch choice, and to vegetable choices.    We discussed Contrave and he agreed to a trial of medical treatment, however his insurance would not cover this medication.  We then decided to use the medication broken apart into Wellbutrin 150 mg in the morning and naltrexone one half tab in the afternoon.    Patient will initiate Wellbutrin in the morning as it can be activating and interfere with sleep if taken later in the day.  Most common side effects of Wellbutrin include mild headache, mild nausea, and feeling of jitteriness.  Oftentimes the side effects will resolve within the first week, and if mild, please continue the medication as the symptoms will likely go away.  If you notice anything more severe please contact the office for instruction.    Naltrexone is generally well-tolerated.  Needs to be taken as a half tab once daily, generally at lunchtime.  You must avoid alcohol, opiates and excessive sugar 4-5 days prior to initiating the dose or you can feel symptoms of withdrawal such as nausea, diarrhea, headache, sweats.      Supplement recommendations:     Probiotics: Consume foods with probiotics or take a probiotic daily (like Udo's Adult Probiotic, VSL-3, Florastor, or Culturelle).   Omega-3: Eat 2 servings of  fish per week or take a good quality omega -3 supplement (Omega health - sold here, Trevose naturals, Leida)  Vitamin D 3: 2,000 IU daily (or more if deficient) with a meal containing fat   Others: New with co-Q10, Osteo Bi-Flex, magnesium, and multivitamin.    Initiate and maintain food journal: Topic Handout provided today.    Bring your food journal to every visit.  We will adjust or continue your plan based on review of this data.      Create a list of advantages that will result from making lifestyle changes and a list of obstacles that may present challenges along the way.     Commit to follow up --- loose, gain or stay the same.  The only way to fail is to quit.  Change will happen if you continue to manage and modify your plan.  We are here to help you do that no matter what happens in the interim.     TEN RULES for living a healthy life and maintaining a healthy weight:       Eat only in response to hunger, and stop when you are no longer hungry.  Recognize cues and emotions that lead to non-hunger eating, and manage them accordingly.     Drink unsweetened beverages and aim to get 6-8 glasses of water daily.                             Avoid regular use of ultra-processed foods, added nitrates or nitrites, sweetened beverages, and artifical sweeteners (sucralose, splenda, equal, sweet-n-low), as they dramatically increase insulin and  worsen insulin resistance. Insulin resistance is a the most common risk factor for weight dysregulation in the [place].     Avoid unnecessary antibiotics in your own self and in the meats and dairy products you use.    Limit alcohol to less than 3 servings in a week.    Achieve adequate NEAT (Non-Exercise Activity Thermogenesis) every day: hourly movement  while awake for at least 250 steps (approximately 5 minutes out of each hour).  Hourly movement prevents metabolic slow down that can happen with prolonged sitting.     Participate in regular dedicated physical  activity  --- at least 150 minutes per week during active weight loss and increasing to 300 minutes per week in the weight maintenance phase.  Make at least 60 of those physical activity minutes resistance training.     Develop mindful eating practices : Eat sitting down, preferably at a table.  Eat your food in a reflective, peaceful setting or in enjoyable company, not in front of a screen, behind the wheel of a car or in the grocery store aisle.   Chew Slowly and thoroughly -- chewing the 1st stage of digestion!     Identify personal stressors.  Modify what you can, and aim to balance what you cannot. Develop and practice daily relaxation techniques to balance stress.  Make time for rejuvenating activities and hobbies at least weekly.     Get adequate and restful sleep every night ---  7.5-8.5 hours a night is what most people require.  Consider a scheduled sleep and wake time if possible.       In Conclusion:     Our plan of action includes frequent visits over the next year to focus on education regarding the appropriate nutrition and physical activity best suited to our patient's individual needs, SMART goal setting and other strategies for lifestyle change, mindfulness, stress reduction,  accountability, and review of any initiated medical interventions.  Visits will space out over time with formation of new life habits and stabilization of the treatment plan. We will enlist the services of the nutritionist, and may in the future consult with exercise physiology or emotional wellness.  Weekly educational classes are also available for extra support, if needed.    Over half of today's 60 minute visit was spent in education and counseling regarding  nutrition, physical activity, metabolism, behavioral modifications, and weight loss goals and benefits as an adjunctive treatment to traditional medications and medical practices for treatment of chronic diseases related to excess weight.         Shayy ARNDT  MD Sharan  10/16/2020     Thank you very much for allowing us to participate in the care of your patient. Please do not hesitate to call or e-mail if there are any questions.

## 2020-10-16 NOTE — ASSESSMENT & PLAN NOTE
Continue on current dose of atorvastatin.    Elevated cholesterol levels often come from a blend of genetic and environmental factors.  Maximizing nutrition and physical activity is an important adjunct to medical management to treat elevated cholesterol levels and reduce the risk of arthrosclerotic disease such as heart attacks, strokes, and peripheral arterial disease.

## 2020-10-16 NOTE — Clinical Note
Aaron Manriquez!  Burt is very motivated to make change, maybe a a little too aggressively.  I think he is going to over restrict despite my recommendations not to..We will follow him closely and see how it goes!

## 2020-10-16 NOTE — PROGRESS NOTES
Questions YES NO   1 During the last 3 months, did you have any episodes of excessive overeating (i.e. eating significantly more than what most people would eat in a similar period)?   x    NOTE: IF YOU ANSWERED “NO” TO QUESTION 1, YOU MAY STOP. THE REMAINING QUESTIONS DO NOT APPLY TO YOU       2 Do you feel distresses about your episodes of excessive overeating?         Within the past 3 months… Never or Rarely Sometimes Often Always   3 During your episodes of excessive overwaiting, how often did you feel like you had no control over your eating (e.g., not being able to stop eating, feel compelled to eat, or going back and forth for more food)?   x    4 During your episodes of excessive overeating, how often did you continue eating even though you were not hungry?  x     5 During your episodes of excessive overeating, how often were you embarrassed by how much you ate?   x    6 During your episodes of excessive overeating, how often did you feel disgusted with yourself or guilty afterwards?  x     7 During the last 3 months, how often did you make yourself vomit as a means to control your weight or shape? Never

## 2020-10-16 NOTE — ASSESSMENT & PLAN NOTE
There is a  cyclical relationship between sleep apnea and excess body weight.  Excess body weight often contributes to sleep apnea and a 10% body weight decrease can result in 30% less apneic episodes. Untreated sleep apnea and its resulting sleep deprived state can be extremely physically stressful and can lead to hypertension, atrial fibrillation, and mood disorders. This stress can also worsen insulin resistance and make weight loss more difficult.  It is very important to sleep well, and I reinforced that is very important to follow outlined medical treatments.

## 2020-10-26 ENCOUNTER — OFFICE VISIT (OUTPATIENT)
Dept: BARIATRICS/WEIGHT MGMT | Facility: CLINIC | Age: 75
End: 2020-10-26
Payer: MEDICARE

## 2020-10-26 VITALS
BODY MASS INDEX: 36.97 KG/M2 | DIASTOLIC BLOOD PRESSURE: 70 MMHG | WEIGHT: 276.38 LBS | SYSTOLIC BLOOD PRESSURE: 130 MMHG

## 2020-10-26 DIAGNOSIS — E66.01 CLASS 2 SEVERE OBESITY WITH SERIOUS COMORBIDITY AND BODY MASS INDEX (BMI) OF 38.0 TO 38.9 IN ADULT, UNSPECIFIED OBESITY TYPE (CMS/HCC): Primary | ICD-10-CM

## 2020-10-26 DIAGNOSIS — R73.03 PREDIABETES: ICD-10-CM

## 2020-10-26 DIAGNOSIS — G47.33 SLEEP APNEA, OBSTRUCTIVE: ICD-10-CM

## 2020-10-26 DIAGNOSIS — E66.812 CLASS 2 SEVERE OBESITY WITH SERIOUS COMORBIDITY AND BODY MASS INDEX (BMI) OF 38.0 TO 38.9 IN ADULT, UNSPECIFIED OBESITY TYPE (CMS/HCC): Primary | ICD-10-CM

## 2020-10-26 PROCEDURE — 99999 PR OFFICE/OUTPT VISIT,PROCEDURE ONLY: CPT | Performed by: NURSE PRACTITIONER

## 2020-10-26 PROCEDURE — G0447 BEHAVIOR COUNSEL OBESITY 15M: HCPCS | Performed by: NURSE PRACTITIONER

## 2020-10-26 RX ORDER — NALTREXONE HYDROCHLORIDE 50 MG/1
25 TABLET, FILM COATED ORAL DAILY
COMMUNITY
End: 2021-01-11 | Stop reason: SDUPTHER

## 2020-10-26 NOTE — ASSESSMENT & PLAN NOTE
Continue on current dose of Metformin.    We discussed today that without change in lifestyle prediabetes will progress to diabetes over time.  Continuing to have the same eating plan, level of nutrition, and lifestyle will not result in stable blood sugars over time.  Doing the same thing will result in worsening of the condition.  The only way to stop progression or produce remission of prediabetes is to eat a whole foods based diet, low in simple carbohydrates, get adequate physical activity, manage stress, and eat only in response to hunger.  Certain medications are also available to help with this process.

## 2020-10-26 NOTE — PROGRESS NOTES
DETAILS OF VISIT     Consulting Service:  [facility] Comprehensive Weight and Wellness Program    Reason for Consultation:   Chief Complaint   Patient presents with   • Follow-up       PCP: Kamran Wilson MD   HISTORY OF PRESENT ILLNESS        Sudheer Mayorga is a 75 y.o. male actively treated for sleep apnea and prediabetes.    PAST MEDICAL AND SURGICAL HISTORY        Past Medical History:   Diagnosis Date   • Allergic rhinitis    • Asthma    • Depression    • Knee tendinitis     right   • Lipid disorder    • HONEY on CPAP    • Prediabetes        Past Surgical History:   Procedure Laterality Date   • HERNIA REPAIR     • ROTATOR CUFF REPAIR         MEDICATIONS        Current Outpatient Medications on File Prior to Visit   Medication Sig Dispense Refill   • naltrexone (DEPADE) 50 mg tablet Take 25 mg by mouth daily.     • albuterol HFA (VENTOLIN HFA) 90 mcg/actuation inhaler INHALE 1 PUFF BY MOUTH EVERY 4 HOURS AS NEEDED     • aspirin (ASPIR-81) 81 mg enteric coated tablet Take 1 tablet (81 mg total) by mouth daily. 90 tablet 3   • atorvastatin (LIPITOR) 20 mg tablet Take 20 mg by mouth once daily.     • BREO ELLIPTA 200-25 mcg/dose blister with device powder for inhalation as needed.       • buPROPion XL (WELLBUTRIN XL) 150 mg 24 hr tablet Take 1 tablet (150 mg total) by mouth daily. 90 tablet 0   • coenzyme Q10 (COQ10) 100 mg capsule Take 100 mg by mouth daily.     • meloxicam (MOBIC) 7.5 mg tablet Take 7.5 mg by mouth once daily.     • metFORMIN (GLUCOPHAGE) 500 mg tablet TAKE 1 TABLET BY MOUTH EVERY MORNING BEFORE BREAKFAST.     • multivit-min/folic/vit K/lycop (ONE-A-DAY MEN'S MULTIVITAMIN ORAL) Take by mouth.     • traZODone (DESYREL) 50 mg tablet Take by mouth as needed.         No current facility-administered medications on file prior to visit.        ALLERGIES        Patient has no known allergies.    SOCIAL HISTORY        Social History     Tobacco Use   • Smoking status: Never Smoker   • Smokeless tobacco: Never  Used   Substance Use Topics   • Alcohol use: Yes     Comment: social   • Drug use: Never       REVIEW OF SYSTEMS      Review of Systems    Review of Systems     PHYSICAL EXAMINATION      Visit Vitals  /70 (BP Location: Left upper arm, Patient Position: Sitting)   Wt 125 kg (276 lb 6 oz)   BMI 36.97 kg/m²      Body mass index is 36.97 kg/m².    BP Readings from Last 3 Encounters:   10/26/20 130/70   10/16/20 (!) 142/80   09/29/20 134/78     Wt Readings from Last 3 Encounters:   10/26/20 125 kg (276 lb 6 oz)   10/16/20 130 kg (287 lb)   09/29/20 129 kg (284 lb)       Physical Exam   Patient  is here today to follow up on using lifestyle modification and weight loss as a adjunct to traditional medical therapy/treatment strategy for sleep apnea and prediabetes.  Medications reviewed. He is tolerating Wellbutrin 150 mg and Naltrexone 25 mg. He wants to increase Naltrexone. Side effect of headache explained to patient. He also denies hunger and cravings. Naltrexone 25 mg is the appropriate dose.     Ask: Burt is happy with the eating plan and also with his pants being loose. He reports feeling good.     Assess: Weight is down 11 pounds since beginning the program 10/16/20.     Nutrition review: He logged his food on a [employer] violet and also paper. He will continue to log with violet. He is eating breakfast, lunch and 2 snacks from [employer] meal plan. Dinner is a lean and green meal. Breakfast is usually a small muffin, mid morning snack is a shake. Lunch is a small chicken noodle soup. Afternoon snack is pretzels and popcorn from [employer]. Dinner is two servings of protein (chicken, turkey), green beans or salad and sweet potatoe. He is not eating after dinner. His eating interval is about 9 hours. He is only drinking 4 glasses of water a day.     Physical activity review: He reports only walking 10-15 a few times. He does keep busy at home and does not sit much.     Emotional Wellness review: Happy and he  denies any issues.     Sleep review: Wears CPAP every night and [id number] pm until 6 am. He feels rested in the morning.     Advise/Agree: He will continue to focus on getting proper amounts of proetin and vegetables daily. We discussed the time line of a few months of nutrisystem and then transition over to lean and green meals 2-3/day. He agreed to increase his water intake and physical activity.     Arrange: Will follow up in one week.       Current Outpatient Medications on File Prior to Visit   Medication Sig Dispense Refill   • naltrexone (DEPADE) 50 mg tablet Take 25 mg by mouth daily.     • albuterol HFA (VENTOLIN HFA) 90 mcg/actuation inhaler INHALE 1 PUFF BY MOUTH EVERY 4 HOURS AS NEEDED     • aspirin (ASPIR-81) 81 mg enteric coated tablet Take 1 tablet (81 mg total) by mouth daily. 90 tablet 3   • atorvastatin (LIPITOR) 20 mg tablet Take 20 mg by mouth once daily.     • BREO ELLIPTA 200-25 mcg/dose blister with device powder for inhalation as needed.       • buPROPion XL (WELLBUTRIN XL) 150 mg 24 hr tablet Take 1 tablet (150 mg total) by mouth daily. 90 tablet 0   • coenzyme Q10 (COQ10) 100 mg capsule Take 100 mg by mouth daily.     • meloxicam (MOBIC) 7.5 mg tablet Take 7.5 mg by mouth once daily.     • metFORMIN (GLUCOPHAGE) 500 mg tablet TAKE 1 TABLET BY MOUTH EVERY MORNING BEFORE BREAKFAST.     • multivit-min/folic/vit K/lycop (ONE-A-DAY MEN'S MULTIVITAMIN ORAL) Take by mouth.     • traZODone (DESYREL) 50 mg tablet Take by mouth as needed.         No current facility-administered medications on file prior to visit.             Patient has no known allergies.                 LABS / IMAGING / EKG        Reviewed the following Labs:    No results found for: HGBA1C  No results found for: CHOL  No results found for: HDL  No results found for: LDLCALC  No results found for: TRIG  No results found for: CHOLHDL      ASSESSMENT AND PLAN         Class 2 severe obesity with serious comorbidity and body mass index  (BMI) of 38.0 to 38.9 in adult (CMS/[facility])  Great job on getting your daily protein and vegetables.    Aim to get glasses of water in your day.  Try Sweet Leaf water drops.    Healthy Lifestyles Visit 2 and 3 provided to patient.   Water and Exercise and Hunger vs Cravings.     Increase your physical activity add strength training 1-2x/week.           Prediabetes  Continue on current dose of Metformin.    We discussed today that without change in lifestyle prediabetes will progress to diabetes over time.  Continuing to have the same eating plan, level of nutrition, and lifestyle will not result in stable blood sugars over time.  Doing the same thing will result in worsening of the condition.  The only way to stop progression or produce remission of prediabetes is to eat a whole foods based diet, low in simple carbohydrates, get adequate physical activity, manage stress, and eat only in response to hunger.  Certain medications are also available to help with this process.      Sleep apnea, obstructive  There is a  cyclical relationship between sleep apnea and excess body weight.  Excess body weight often contributes to sleep apnea and a 10% body weight decrease can result in 30% less apneic episodes. Untreated sleep apnea and its resulting sleep deprived state can be extremely physically stressful and can lead to hypertension, atrial fibrillation, and mood disorders. This stress can also worsen insulin resistance and make weight loss more difficult.  It is very important to sleep well, and I reinforced that is very important to follow outlined medical treatments.     Educational and counseling on strategies for lifestyle change to address weight related health conditions over 1/2 of todays 20 minute appointment.       THOMAS Eldridge  10/26/2020     Thank you very much for allowing us to participate in the care of your patient. Please do not hesitate to call or email if there are any questions.

## 2020-10-26 NOTE — PATIENT INSTRUCTIONS
Problem List Items Addressed This Visit        Respiratory    Sleep apnea, obstructive     There is a  cyclical relationship between sleep apnea and excess body weight.  Excess body weight often contributes to sleep apnea and a 10% body weight decrease can result in 30% less apneic episodes. Untreated sleep apnea and its resulting sleep deprived state can be extremely physically stressful and can lead to hypertension, atrial fibrillation, and mood disorders. This stress can also worsen insulin resistance and make weight loss more difficult.  It is very important to sleep well, and I reinforced that is very important to follow outlined medical treatments.               Endocrine/Metabolic    Prediabetes     Continue on current dose of Metformin.    We discussed today that without change in lifestyle prediabetes will progress to diabetes over time.  Continuing to have the same eating plan, level of nutrition, and lifestyle will not result in stable blood sugars over time.  Doing the same thing will result in worsening of the condition.  The only way to stop progression or produce remission of prediabetes is to eat a whole foods based diet, low in simple carbohydrates, get adequate physical activity, manage stress, and eat only in response to hunger.  Certain medications are also available to help with this process.           Class 2 severe obesity with serious comorbidity and body mass index (BMI) of 38.0 to 38.9 in adult (CMS/[facility]) - Primary     Great job on getting your daily protein and vegetables.    Aim to get glasses of water in your day.  Try Sweet Leaf water drops.    Healthy Lifestyles Visit 2 and 3 provided to patient.   Water and Exercise and Hunger vs Cravings.     Increase your physical activity add strength training 1-2x/week.                       home

## 2020-10-26 NOTE — ASSESSMENT & PLAN NOTE
Great job on getting your daily protein and vegetables.    Aim to get glasses of water in your day.  Try Sweet Leaf water drops.    Healthy Lifestyles Visit 2 and 3 provided to patient.   Water and Exercise and Hunger vs Cravings.     Increase your physical activity add strength training 1-2x/week.

## 2020-11-02 ENCOUNTER — OFFICE VISIT (OUTPATIENT)
Dept: BARIATRICS/WEIGHT MGMT | Facility: CLINIC | Age: 75
End: 2020-11-02
Payer: MEDICARE

## 2020-11-02 VITALS — DIASTOLIC BLOOD PRESSURE: 80 MMHG | WEIGHT: 274.3 LBS | SYSTOLIC BLOOD PRESSURE: 126 MMHG | BODY MASS INDEX: 36.69 KG/M2

## 2020-11-02 DIAGNOSIS — E66.812 CLASS 2 SEVERE OBESITY WITH SERIOUS COMORBIDITY AND BODY MASS INDEX (BMI) OF 38.0 TO 38.9 IN ADULT, UNSPECIFIED OBESITY TYPE (CMS/HCC): Primary | ICD-10-CM

## 2020-11-02 DIAGNOSIS — R73.03 PREDIABETES: ICD-10-CM

## 2020-11-02 DIAGNOSIS — E66.01 CLASS 2 SEVERE OBESITY WITH SERIOUS COMORBIDITY AND BODY MASS INDEX (BMI) OF 38.0 TO 38.9 IN ADULT, UNSPECIFIED OBESITY TYPE (CMS/HCC): Primary | ICD-10-CM

## 2020-11-02 DIAGNOSIS — G47.33 SLEEP APNEA, OBSTRUCTIVE: ICD-10-CM

## 2020-11-02 PROCEDURE — 99999 PR OFFICE/OUTPT VISIT,PROCEDURE ONLY: CPT | Performed by: NURSE PRACTITIONER

## 2020-11-02 PROCEDURE — G0447 BEHAVIOR COUNSEL OBESITY 15M: HCPCS | Performed by: NURSE PRACTITIONER

## 2020-11-02 NOTE — PATIENT INSTRUCTIONS
Problem List Items Addressed This Visit        Respiratory    Sleep apnea, obstructive     There is a  cyclical relationship between sleep apnea and excess body weight.  Excess body weight often contributes to sleep apnea and a 10% body weight decrease can result in 30% less apneic episodes. Untreated sleep apnea and its resulting sleep deprived state can be extremely physically stressful and can lead to hypertension, atrial fibrillation, and mood disorders. This stress can also worsen insulin resistance and make weight loss more difficult.  It is very important to sleep well, and I reinforced that is very important to follow outlined medical treatments.             Endocrine/Metabolic    Prediabetes     We discussed today that without change in lifestyle prediabetes will progress to diabetes over time.  Continuing to have the same eating plan, level of nutrition, and lifestyle will not result in stable blood sugars over time.  Doing the same thing will result in worsening of the condition.  The only way to stop progression or produce remission of prediabetes is to eat a whole foods based diet, low in simple carbohydrates, get adequate physical activity, manage stress, and eat only in response to hunger.  Certain medications are also available to help with this process.           Class 2 severe obesity with serious comorbidity and body mass index (BMI) of 38.0 to 38.9 in adult (CMS/[facility]) - Primary     Continue to focus on getting proper amount of protein and vegetables.     Great job on increasing your water intake.     Try to walk at least 1-2 x/week around the block!

## 2020-11-02 NOTE — ASSESSMENT & PLAN NOTE
We discussed today that without change in lifestyle prediabetes will progress to diabetes over time.  Continuing to have the same eating plan, level of nutrition, and lifestyle will not result in stable blood sugars over time.  Doing the same thing will result in worsening of the condition.  The only way to stop progression or produce remission of prediabetes is to eat a whole foods based diet, low in simple carbohydrates, get adequate physical activity, manage stress, and eat only in response to hunger.  Certain medications are also available to help with this process.

## 2020-11-02 NOTE — ASSESSMENT & PLAN NOTE
Continue to focus on getting proper amount of protein and vegetables.     Great job on increasing your water intake.     Try to walk at least 1-2 x/week around the block!

## 2020-11-02 NOTE — PROGRESS NOTES
DETAILS OF VISIT     Consulting Service:  [facility] Comprehensive Weight and Wellness Program      Reason for Consultation:   Chief Complaint   Patient presents with   • Obesity       PCP: Kamran Wilson MD   HISTORY OF PRESENT ILLNESS        Sudheer Mayorga is a 75 y.o. male actively treated for prediabetes and sleep apnea.    PAST MEDICAL AND SURGICAL HISTORY        Past Medical History:   Diagnosis Date   • Allergic rhinitis    • Asthma    • Depression    • Knee tendinitis     right   • Lipid disorder    • HONEY on CPAP    • Prediabetes        Past Surgical History:   Procedure Laterality Date   • HERNIA REPAIR     • ROTATOR CUFF REPAIR         MEDICATIONS        Current Outpatient Medications on File Prior to Visit   Medication Sig Dispense Refill   • albuterol HFA (VENTOLIN HFA) 90 mcg/actuation inhaler INHALE 1 PUFF BY MOUTH EVERY 4 HOURS AS NEEDED     • aspirin (ASPIR-81) 81 mg enteric coated tablet Take 1 tablet (81 mg total) by mouth daily. 90 tablet 3   • atorvastatin (LIPITOR) 20 mg tablet Take 20 mg by mouth once daily.     • BREO ELLIPTA 200-25 mcg/dose blister with device powder for inhalation as needed.       • buPROPion XL (WELLBUTRIN XL) 150 mg 24 hr tablet Take 1 tablet (150 mg total) by mouth daily. 90 tablet 0   • coenzyme Q10 (COQ10) 100 mg capsule Take 100 mg by mouth daily.     • meloxicam (MOBIC) 7.5 mg tablet Take 7.5 mg by mouth once daily.     • metFORMIN (GLUCOPHAGE) 500 mg tablet TAKE 1 TABLET BY MOUTH EVERY MORNING BEFORE BREAKFAST.     • multivit-min/folic/vit K/lycop (ONE-A-DAY MEN'S MULTIVITAMIN ORAL) Take by mouth.     • naltrexone (DEPADE) 50 mg tablet Take 25 mg by mouth daily.     • traZODone (DESYREL) 50 mg tablet Take by mouth as needed.         No current facility-administered medications on file prior to visit.        ALLERGIES        Patient has no known allergies.    SOCIAL HISTORY        Social History     Tobacco Use   • Smoking status: Never Smoker   • Smokeless tobacco: Never  Used   Substance Use Topics   • Alcohol use: Yes     Comment: social   • Drug use: Never       REVIEW OF SYSTEMS      Review of Systems    Review of Systems     PHYSICAL EXAMINATION      Visit Vitals  /80 (BP Location: Left upper arm, Patient Position: Sitting)   Wt 124 kg (274 lb 4.8 oz)   BMI 36.69 kg/m²      Body mass index is 36.69 kg/m².    BP Readings from Last 3 Encounters:   11/02/20 126/80   10/26/20 130/70   10/16/20 (!) 142/80     Wt Readings from Last 3 Encounters:   11/02/20 124 kg (274 lb 4.8 oz)   10/26/20 125 kg (276 lb 6 oz)   10/16/20 130 kg (287 lb)       Physical Exam    Patient is here today to follow up on using lifestyle modification and weight loss as a adjunct to traditional medical therapy/treatment strategy for prediabetes and sleep apnea.  Medications reviewed. He is taking Wellbutrin and Naltrexone without side effects. He is going to add Fish Oil daily .     Ask: He is very happy with his weight loss and feels things are going well.     Assess: He reports his right knee pain has improved. Weight is down 13 pounds since beginning the program.     Nutrition review He increased his water intake to 5 glasses/day and loves the sweet leaf drops. He continues to eat [employer]stem 2 meals and 2 snack/day. He had spare ribs one night for dinner. He skipped lunch Saturday to go out to dinner. He had brisket, roated potatoes, brussel sprouts and small slice of key lime pie. He had a bad day where he ate spaghetti and meatballs.We reviewed that he just needs to watch his portions. He continues to log his food on paper and [employer] violet.     Physical activity review:No dedicated exercise yet. He is planning to sign up for [employer] but is scared of Covid.     Emotional Wellness review: He is happy and feels good about how things are going.     Sleep review: Sleep is good.     Advise/Agree: He has agreed to continue to focus on getting proper amount of water, protein and vegetables daily. He  will also try to add a walking routine.     Arrange: Will follow up in one week.     LABS / IMAGING / EKG        Reviewed the following Labs:    No results found for: HGBA1C  No results found for: CHOL  No results found for: HDL  No results found for: LDLCALC  No results found for: TRIG  No results found for: CHOLHDL      ASSESSMENT AND PLAN         Sleep apnea, obstructive  There is a  cyclical relationship between sleep apnea and excess body weight.  Excess body weight often contributes to sleep apnea and a 10% body weight decrease can result in 30% less apneic episodes. Untreated sleep apnea and its resulting sleep deprived state can be extremely physically stressful and can lead to hypertension, atrial fibrillation, and mood disorders. This stress can also worsen insulin resistance and make weight loss more difficult.  It is very important to sleep well, and I reinforced that is very important to follow outlined medical treatments.     Prediabetes  We discussed today that without change in lifestyle prediabetes will progress to diabetes over time.  Continuing to have the same eating plan, level of nutrition, and lifestyle will not result in stable blood sugars over time.  Doing the same thing will result in worsening of the condition.  The only way to stop progression or produce remission of prediabetes is to eat a whole foods based diet, low in simple carbohydrates, get adequate physical activity, manage stress, and eat only in response to hunger.  Certain medications are also available to help with this process.      Class 2 severe obesity with serious comorbidity and body mass index (BMI) of 38.0 to 38.9 in adult (CMS/[facility])  Continue to focus on getting proper amount of protein and vegetables.     Great job on increasing your water intake.     Try to walk at least 1-2 x/week around the block!  Educational and counseling on strategies for lifestyle change to address weight related health conditions over 1/2 of  todays 20 minute appointment.       THOMAS Eldridge  11/2/2020     Thank you very much for allowing us to participate in the care of your patient. Please do not hesitate to call or email if there are any questions.

## 2020-11-04 ENCOUNTER — TELEPHONE (OUTPATIENT)
Dept: BARIATRICS/WEIGHT MGMT | Facility: CLINIC | Age: 75
End: 2020-11-04

## 2020-11-09 ENCOUNTER — OFFICE VISIT (OUTPATIENT)
Dept: BARIATRICS/WEIGHT MGMT | Facility: CLINIC | Age: 75
End: 2020-11-09
Payer: MEDICARE

## 2020-11-09 VITALS — DIASTOLIC BLOOD PRESSURE: 80 MMHG | BODY MASS INDEX: 36.77 KG/M2 | WEIGHT: 274.9 LBS | SYSTOLIC BLOOD PRESSURE: 138 MMHG

## 2020-11-09 DIAGNOSIS — E66.01 CLASS 2 SEVERE OBESITY WITH SERIOUS COMORBIDITY AND BODY MASS INDEX (BMI) OF 38.0 TO 38.9 IN ADULT, UNSPECIFIED OBESITY TYPE (CMS/HCC): Primary | ICD-10-CM

## 2020-11-09 DIAGNOSIS — R73.03 PREDIABETES: ICD-10-CM

## 2020-11-09 DIAGNOSIS — E66.812 CLASS 2 SEVERE OBESITY WITH SERIOUS COMORBIDITY AND BODY MASS INDEX (BMI) OF 38.0 TO 38.9 IN ADULT, UNSPECIFIED OBESITY TYPE (CMS/HCC): Primary | ICD-10-CM

## 2020-11-09 DIAGNOSIS — G47.33 SLEEP APNEA, OBSTRUCTIVE: ICD-10-CM

## 2020-11-09 PROCEDURE — G0447 BEHAVIOR COUNSEL OBESITY 15M: HCPCS | Performed by: NURSE PRACTITIONER

## 2020-11-09 PROCEDURE — 99999 PR OFFICE/OUTPT VISIT,PROCEDURE ONLY: CPT | Performed by: NURSE PRACTITIONER

## 2020-11-09 NOTE — PATIENT INSTRUCTIONS
Problem List Items Addressed This Visit        Respiratory    Sleep apnea, obstructive     There is a  cyclical relationship between sleep apnea and excess body weight.  Excess body weight often contributes to sleep apnea and a 10% body weight decrease can result in 30% less apneic episodes. Untreated sleep apnea and its resulting sleep deprived state can be extremely physically stressful and can lead to hypertension, atrial fibrillation, and mood disorders. This stress can also worsen insulin resistance and make weight loss more difficult.  It is very important to sleep well, and I reinforced that is very important to follow outlined medical treatments.             Endocrine/Metabolic    Prediabetes     Continue with current weight loss strategies.          Class 2 severe obesity with serious comorbidity and body mass index (BMI) of 38.0 to 38.9 in adult (CMS/[facility]) - Primary     Try stress reduction!  Mindful meditation : Sit quietly and comfortably,  focus on your breath --- breathe in for 8 seconds, hold for 2, breathe out for 6, repeat. If thoughts come into your mind, acknowledge they exist but try not to engage/act on them and return focus to your breath. Start with 1 minute and work up from there with a goal of at least 5 minutes twice a day.   Example: you are focused on you breath and the thought that you need to go to the grocery store comes into your mind.  Resist acting on that thought, do not starting making a list, but instead recognize that it exists and return your thoughts back to your breath.    Continue to follow [facility] eating plan and get plenty of water.     Great job increasing your walking - try 3 times this week.

## 2020-11-09 NOTE — ASSESSMENT & PLAN NOTE
Try stress reduction!  Mindful meditation : Sit quietly and comfortably,  focus on your breath --- breathe in for 8 seconds, hold for 2, breathe out for 6, repeat. If thoughts come into your mind, acknowledge they exist but try not to engage/act on them and return focus to your breath. Start with 1 minute and work up from there with a goal of at least 5 minutes twice a day.   Example: you are focused on you breath and the thought that you need to go to the grocery store comes into your mind.  Resist acting on that thought, do not starting making a list, but instead recognize that it exists and return your thoughts back to your breath.    Continue to follow [facility] eating plan and get plenty of water.     Great job increasing your walking - try 3 times this week.

## 2020-11-09 NOTE — PROGRESS NOTES
DETAILS OF VISIT     Consulting Service:  [facility] Comprehensive Weight and Wellness Program      Reason for Consultation:   Chief Complaint   Patient presents with   • Follow-up       PCP: Kamran Wilson MD   HISTORY OF PRESENT ILLNESS        Sudheer Mayorga is a 75 y.o. male actively treated for prediabetes and sleep apnea.    PAST MEDICAL AND SURGICAL HISTORY        Past Medical History:   Diagnosis Date   • Allergic rhinitis    • Asthma    • Depression    • Knee tendinitis     right   • Lipid disorder    • HONEY on CPAP    • Prediabetes        Past Surgical History:   Procedure Laterality Date   • HERNIA REPAIR     • ROTATOR CUFF REPAIR         MEDICATIONS        Current Outpatient Medications on File Prior to Visit   Medication Sig Dispense Refill   • albuterol HFA (VENTOLIN HFA) 90 mcg/actuation inhaler INHALE 1 PUFF BY MOUTH EVERY 4 HOURS AS NEEDED     • aspirin (ASPIR-81) 81 mg enteric coated tablet Take 1 tablet (81 mg total) by mouth daily. 90 tablet 3   • atorvastatin (LIPITOR) 20 mg tablet Take 20 mg by mouth once daily.     • BREO ELLIPTA 200-25 mcg/dose blister with device powder for inhalation as needed.       • buPROPion XL (WELLBUTRIN XL) 150 mg 24 hr tablet Take 1 tablet (150 mg total) by mouth daily. 90 tablet 0   • coenzyme Q10 (COQ10) 100 mg capsule Take 100 mg by mouth daily.     • meloxicam (MOBIC) 7.5 mg tablet Take 7.5 mg by mouth once daily.     • metFORMIN (GLUCOPHAGE) 500 mg tablet TAKE 1 TABLET BY MOUTH EVERY MORNING BEFORE BREAKFAST.     • multivit-min/folic/vit K/lycop (ONE-A-DAY MEN'S MULTIVITAMIN ORAL) Take by mouth.     • naltrexone (DEPADE) 50 mg tablet Take 25 mg by mouth daily.     • traZODone (DESYREL) 50 mg tablet Take by mouth as needed.         No current facility-administered medications on file prior to visit.        ALLERGIES        Patient has no known allergies.    SOCIAL HISTORY        Social History     Tobacco Use   • Smoking status: Never Smoker   • Smokeless tobacco: Never  Used   Substance Use Topics   • Alcohol use: Yes     Comment: social   • Drug use: Never       REVIEW OF SYSTEMS      Review of Systems    Review of Systems     PHYSICAL EXAMINATION      Visit Vitals  /80 (BP Location: Left upper arm, Patient Position: Sitting)   Wt 125 kg (274 lb 14.4 oz)   BMI 36.77 kg/m²      Body mass index is 36.77 kg/m².      BP Readings from Last 3 Encounters:   11/09/20 138/80   11/02/20 126/80   10/26/20 130/70     Wt Readings from Last 3 Encounters:   11/09/20 125 kg (274 lb 14.4 oz)   11/02/20 124 kg (274 lb 4.8 oz)   10/26/20 125 kg (276 lb 6 oz)       Physical Exam    Patient  is here today to follow up on using lifestyle modification and weight loss as a adjunct to traditional medical therapy/treatment strategy for prediabetes and sleep apnea.  Medications reviewed. No changes in medications and no side effects.     Ask: Burt reports it was a stressful week. He went off the eating plan a few times.     Assess: Weight was up a half pound this week.     Nutrition review:He had a few dinners out this week. He is drinking plenty of water. He continues to eat nutrisystem for 2 meals/day. Yesterday he had an egg white omelette with salsa. He is not eating bread. Dinner last night was asian, smaller portions of spare ribs and wonton soup. He did snack more this past week.     Physical activity review: He was albe to walk more. He did 30 minutes twice3 this week.     Emotional Wellness review: He is stressed out due to the election.     Sleep review: Sleep is good he is wearing the CPAP machine about 5-6 hours/night    Advise/Agree:He agreed to try relaxation techniques and also to increase his exercise this week due to the nice weather.     Arrange: Will follow up in one week..     LABS / IMAGING / EKG        Reviewed the following Labs:    No results found for: HGBA1C  No results found for: CHOL  No results found for: HDL  No results found for: LDLCALC  No results found for: TRIG  No  results found for: CHOLHDL      ASSESSMENT AND PLAN         Class 2 severe obesity with serious comorbidity and body mass index (BMI) of 38.0 to 38.9 in adult (CMS/[facility])  Try stress reduction!  Mindful meditation : Sit quietly and comfortably,  focus on your breath --- breathe in for 8 seconds, hold for 2, breathe out for 6, repeat. If thoughts come into your mind, acknowledge they exist but try not to engage/act on them and return focus to your breath. Start with 1 minute and work up from there with a goal of at least 5 minutes twice a day.   Example: you are focused on you breath and the thought that you need to go to the grocery store comes into your mind.  Resist acting on that thought, do not starting making a list, but instead recognize that it exists and return your thoughts back to your breath.    Continue to follow LGI eating plan and get plenty of water.     Great job increasing your walking - try 3 times this week.         Prediabetes  Continue with current weight loss strategies.     Sleep apnea, obstructive  There is a  cyclical relationship between sleep apnea and excess body weight.  Excess body weight often contributes to sleep apnea and a 10% body weight decrease can result in 30% less apneic episodes. Untreated sleep apnea and its resulting sleep deprived state can be extremely physically stressful and can lead to hypertension, atrial fibrillation, and mood disorders. This stress can also worsen insulin resistance and make weight loss more difficult.  It is very important to sleep well, and I reinforced that is very important to follow outlined medical treatments.     Educational and counseling on strategies for lifestyle change to address weight related health conditions over 1/2 of todays 20 minute appointment.     THOMAS Eldridge  11/9/2020     Thank you very much for allowing us to participate in the care of your patient. Please do not hesitate to call or email if there are any questions.

## 2020-11-16 ENCOUNTER — OFFICE VISIT (OUTPATIENT)
Dept: BARIATRICS/WEIGHT MGMT | Facility: CLINIC | Age: 75
End: 2020-11-16
Payer: MEDICARE

## 2020-11-16 VITALS
SYSTOLIC BLOOD PRESSURE: 136 MMHG | DIASTOLIC BLOOD PRESSURE: 74 MMHG | BODY MASS INDEX: 36.49 KG/M2 | HEIGHT: 72 IN | HEART RATE: 80 BPM | OXYGEN SATURATION: 97 % | WEIGHT: 269.4 LBS

## 2020-11-16 DIAGNOSIS — R73.03 PREDIABETES: ICD-10-CM

## 2020-11-16 DIAGNOSIS — E66.01 CLASS 2 SEVERE OBESITY WITH SERIOUS COMORBIDITY AND BODY MASS INDEX (BMI) OF 38.0 TO 38.9 IN ADULT, UNSPECIFIED OBESITY TYPE (CMS/HCC): Primary | ICD-10-CM

## 2020-11-16 DIAGNOSIS — G47.33 SLEEP APNEA, OBSTRUCTIVE: ICD-10-CM

## 2020-11-16 DIAGNOSIS — E66.812 CLASS 2 SEVERE OBESITY WITH SERIOUS COMORBIDITY AND BODY MASS INDEX (BMI) OF 38.0 TO 38.9 IN ADULT, UNSPECIFIED OBESITY TYPE (CMS/HCC): Primary | ICD-10-CM

## 2020-11-16 PROCEDURE — 99999 PR OFFICE/OUTPT VISIT,PROCEDURE ONLY: CPT | Performed by: NURSE PRACTITIONER

## 2020-11-16 PROCEDURE — G0447 BEHAVIOR COUNSEL OBESITY 15M: HCPCS | Performed by: NURSE PRACTITIONER

## 2020-11-16 NOTE — PROGRESS NOTES
DETAILS OF VISIT     Consulting Service:  [facility] Comprehensive Weight and Wellness Program    Reason for Consultation:   Chief Complaint   Patient presents with   • Follow-up       PCP: Kamran Wilson MD   HISTORY OF PRESENT ILLNESS        Sudheer Mayorga is a 75 y.o. male actively treated for sleep apnea and prediabetes.    PAST MEDICAL AND SURGICAL HISTORY        Past Medical History:   Diagnosis Date   • Allergic rhinitis    • Asthma    • Depression    • Knee tendinitis     right   • Lipid disorder    • HONEY on CPAP    • Prediabetes        Past Surgical History:   Procedure Laterality Date   • HERNIA REPAIR     • ROTATOR CUFF REPAIR         MEDICATIONS        Current Outpatient Medications on File Prior to Visit   Medication Sig Dispense Refill   • albuterol HFA (VENTOLIN HFA) 90 mcg/actuation inhaler INHALE 1 PUFF BY MOUTH EVERY 4 HOURS AS NEEDED     • aspirin (ASPIR-81) 81 mg enteric coated tablet Take 1 tablet (81 mg total) by mouth daily. 90 tablet 3   • atorvastatin (LIPITOR) 20 mg tablet Take 20 mg by mouth once daily.     • BREO ELLIPTA 200-25 mcg/dose blister with device powder for inhalation as needed.       • buPROPion XL (WELLBUTRIN XL) 150 mg 24 hr tablet Take 1 tablet (150 mg total) by mouth daily. 90 tablet 0   • coenzyme Q10 (COQ10) 100 mg capsule Take 100 mg by mouth daily.     • meloxicam (MOBIC) 7.5 mg tablet Take 7.5 mg by mouth once daily.     • metFORMIN (GLUCOPHAGE) 500 mg tablet TAKE 1 TABLET BY MOUTH EVERY MORNING BEFORE BREAKFAST.     • multivit-min/folic/vit K/lycop (ONE-A-DAY MEN'S MULTIVITAMIN ORAL) Take by mouth.     • naltrexone (DEPADE) 50 mg tablet Take 25 mg by mouth daily.     • traZODone (DESYREL) 50 mg tablet Take by mouth as needed.         No current facility-administered medications on file prior to visit.        ALLERGIES        Patient has no known allergies.    SOCIAL HISTORY        Social History     Tobacco Use   • Smoking status: Never Smoker   • Smokeless tobacco: Never  "Used   Substance Use Topics   • Alcohol use: Yes     Comment: social   • Drug use: Never       REVIEW OF SYSTEMS      Review of Systems    Review of Systems     PHYSICAL EXAMINATION      Visit Vitals  /74   Pulse 80   Ht 1.83 m (6' 0.05\")   Wt 122 kg (269 lb 6.4 oz)   SpO2 97%   BMI 36.49 kg/m²      Body mass index is 36.49 kg/m².    BP Readings from Last 3 Encounters:   11/16/20 136/74   11/09/20 138/80   11/02/20 126/80     Wt Readings from Last 3 Encounters:   11/16/20 122 kg (269 lb 6.4 oz)   11/09/20 125 kg (274 lb 14.4 oz)   11/02/20 124 kg (274 lb 4.8 oz)       Physical Exam  Patient  is here today to follow up on using lifestyle modification and weight loss as a adjunct to traditional medical therapy/treatment strategy for sleep apnea and prediabetes.  Medications reviewed. No changes in medications.     Ask: He feels good and is happy about his weight loss.     Assess: Weight is down 18 pounds since beginning the program.     Nutrition review:He is watching his portions at dinner. He coontinues to eat 2 nutrisystem meals and 2 snacks. Dinner has been meatloaf, salmon, roast beef mediallions, no bread and vegetables. He does not eat afrter dinner. He drinks abouty 5 glasses of water/day.     Physical activity review: He is going to join [employer] soon. He is walking at [facility] 3 x/week. He is not interested in adding resistance training at this time.      Emotional Wellness review: Feels fine. He got to see his 4 daughters yesterday and his grandkids and is happy about this.     Sleep review:  Sleeping good 6 -7 hours with CPAP     Advise/Agree: He will continue to focus on getting enough protein and water in his day.     Arrange: Will follow up in two weeks.     LABS / IMAGING / EKG        Reviewed the following Labs:    No results found for: HGBA1C  No results found for: CHOL  No results found for: HDL  No results found for: LDLCALC  No results found for: TRIG  No results found for: " CHOLHDL      ASSESSMENT AND PLAN         Class 2 severe obesity with serious comorbidity and body mass index (BMI) of 38.0 to 38.9 in adult (CMS/[facility])  Focus on getting enough protein - fish, poultry,cottage cheese and greek yogurt.     Great job on increasing your water.     Increase your physical activity with walking.     Prediabetes  We discussed today that without change in lifestyle prediabetes will progress to diabetes over time.  Continuing to have the same eating plan, level of nutrition, and lifestyle will not result in stable blood sugars over time.  Doing the same thing will result in worsening of the condition.  The only way to stop progression or produce remission of prediabetes is to eat a whole foods based diet, low in simple carbohydrates, get adequate physical activity, manage stress, and eat only in response to hunger.  Certain medications are also available to help with this process.      Sleep apnea, obstructive  Continue with current weight loss strategies.     Educational and counseling on strategies for lifestyle change to address weight related health conditions over 1/2 of todays 20 minute appointment.     THOMAS Eldridge  11/16/2020     Thank you very much for allowing us to participate in the care of your patient. Please do not hesitate to call or email if there are any questions.

## 2020-11-16 NOTE — ASSESSMENT & PLAN NOTE
Focus on getting enough protein - fish, poultry,cottage cheese and greek yogurt.     Great job on increasing your water.     Increase your physical activity with walking.

## 2020-11-16 NOTE — PATIENT INSTRUCTIONS
Problem List Items Addressed This Visit        Respiratory    Sleep apnea, obstructive     Continue with current weight loss strategies.             Endocrine/Metabolic    Prediabetes     We discussed today that without change in lifestyle prediabetes will progress to diabetes over time.  Continuing to have the same eating plan, level of nutrition, and lifestyle will not result in stable blood sugars over time.  Doing the same thing will result in worsening of the condition.  The only way to stop progression or produce remission of prediabetes is to eat a whole foods based diet, low in simple carbohydrates, get adequate physical activity, manage stress, and eat only in response to hunger.  Certain medications are also available to help with this process.           Class 2 severe obesity with serious comorbidity and body mass index (BMI) of 38.0 to 38.9 in adult (CMS/[facility]) - Primary     Focus on getting enough protein - fish, poultry,cottage cheese and greek yogurt.     Great job on increasing your water.     Increase your physical activity with walking.

## 2020-11-30 ENCOUNTER — OFFICE VISIT (OUTPATIENT)
Dept: BARIATRICS/WEIGHT MGMT | Facility: CLINIC | Age: 75
End: 2020-11-30
Payer: MEDICARE

## 2020-11-30 VITALS
DIASTOLIC BLOOD PRESSURE: 78 MMHG | SYSTOLIC BLOOD PRESSURE: 122 MMHG | BODY MASS INDEX: 36.37 KG/M2 | WEIGHT: 268.56 LBS

## 2020-11-30 DIAGNOSIS — E66.812 CLASS 2 SEVERE OBESITY WITH SERIOUS COMORBIDITY AND BODY MASS INDEX (BMI) OF 38.0 TO 38.9 IN ADULT, UNSPECIFIED OBESITY TYPE (CMS/HCC): ICD-10-CM

## 2020-11-30 DIAGNOSIS — E66.01 CLASS 2 SEVERE OBESITY WITH SERIOUS COMORBIDITY AND BODY MASS INDEX (BMI) OF 38.0 TO 38.9 IN ADULT, UNSPECIFIED OBESITY TYPE (CMS/HCC): ICD-10-CM

## 2020-11-30 PROCEDURE — 99999 PR OFFICE/OUTPT VISIT,PROCEDURE ONLY: CPT | Performed by: NURSE PRACTITIONER

## 2020-11-30 PROCEDURE — G0447 BEHAVIOR COUNSEL OBESITY 15M: HCPCS | Performed by: NURSE PRACTITIONER

## 2020-12-14 ENCOUNTER — OFFICE VISIT (OUTPATIENT)
Dept: BARIATRICS/WEIGHT MGMT | Facility: CLINIC | Age: 75
End: 2020-12-14
Payer: MEDICARE

## 2020-12-14 VITALS
WEIGHT: 264.13 LBS | BODY MASS INDEX: 35.77 KG/M2 | DIASTOLIC BLOOD PRESSURE: 60 MMHG | SYSTOLIC BLOOD PRESSURE: 128 MMHG

## 2020-12-14 DIAGNOSIS — E66.812 CLASS 2 SEVERE OBESITY WITH SERIOUS COMORBIDITY AND BODY MASS INDEX (BMI) OF 38.0 TO 38.9 IN ADULT, UNSPECIFIED OBESITY TYPE (CMS/HCC): ICD-10-CM

## 2020-12-14 DIAGNOSIS — E66.01 CLASS 2 SEVERE OBESITY WITH SERIOUS COMORBIDITY AND BODY MASS INDEX (BMI) OF 38.0 TO 38.9 IN ADULT, UNSPECIFIED OBESITY TYPE (CMS/HCC): ICD-10-CM

## 2020-12-14 PROCEDURE — G0447 BEHAVIOR COUNSEL OBESITY 15M: HCPCS | Performed by: NURSE PRACTITIONER

## 2020-12-14 PROCEDURE — 99999 PR OFFICE/OUTPT VISIT,PROCEDURE ONLY: CPT | Performed by: NURSE PRACTITIONER

## 2020-12-14 NOTE — PROGRESS NOTES
DETAILS OF VISIT     Consulting Service:  [facility] Comprehensive Weight and Wellness Program    Reason for Consultation:   Chief Complaint   Patient presents with   • Follow-up       PCP: Kamran Wilson MD   HISTORY OF PRESENT ILLNESS        Sudheer Mayorga is a 75 y.o. male actively treated for prediabetes and sleep apnea.     PAST MEDICAL AND SURGICAL HISTORY        Past Medical History:   Diagnosis Date   • Allergic rhinitis    • Asthma    • Depression    • Knee tendinitis     right   • Lipid disorder    • HONEY on CPAP    • Prediabetes        Past Surgical History:   Procedure Laterality Date   • HERNIA REPAIR     • ROTATOR CUFF REPAIR         MEDICATIONS        Current Outpatient Medications on File Prior to Visit   Medication Sig Dispense Refill   • albuterol HFA (VENTOLIN HFA) 90 mcg/actuation inhaler INHALE 1 PUFF BY MOUTH EVERY 4 HOURS AS NEEDED     • aspirin (ASPIR-81) 81 mg enteric coated tablet Take 1 tablet (81 mg total) by mouth daily. 90 tablet 3   • atorvastatin (LIPITOR) 20 mg tablet Take 20 mg by mouth once daily.     • BREO ELLIPTA 200-25 mcg/dose blister with device powder for inhalation as needed.       • buPROPion XL (WELLBUTRIN XL) 150 mg 24 hr tablet Take 1 tablet (150 mg total) by mouth daily. 90 tablet 0   • coenzyme Q10 (COQ10) 100 mg capsule Take 100 mg by mouth daily.     • meloxicam (MOBIC) 7.5 mg tablet Take 7.5 mg by mouth once daily.     • metFORMIN (GLUCOPHAGE) 500 mg tablet TAKE 1 TABLET BY MOUTH EVERY MORNING BEFORE BREAKFAST.     • multivit-min/folic/vit K/lycop (ONE-A-DAY MEN'S MULTIVITAMIN ORAL) Take by mouth.     • naltrexone (DEPADE) 50 mg tablet Take 25 mg by mouth daily.     • traZODone (DESYREL) 50 mg tablet Take by mouth as needed.         No current facility-administered medications on file prior to visit.        ALLERGIES        Patient has no known allergies.    SOCIAL HISTORY        Social History     Tobacco Use   • Smoking status: Never Smoker   • Smokeless tobacco: Never  Used   Substance Use Topics   • Alcohol use: Yes     Comment: social   • Drug use: Never       REVIEW OF SYSTEMS      Review of Systems    Review of Systems     PHYSICAL EXAMINATION      Visit Vitals  /60   Wt 120 kg (264 lb 2 oz)   BMI 35.77 kg/m²      Body mass index is 35.77 kg/m².      BP Readings from Last 3 Encounters:   12/14/20 128/60   11/30/20 122/78   11/16/20 136/74     Wt Readings from Last 3 Encounters:   12/14/20 120 kg (264 lb 2 oz)   11/30/20 122 kg (268 lb 9 oz)   11/16/20 122 kg (269 lb 6.4 oz)       Physical Exam    Patient  is here today to follow up on using lifestyle modification and weight loss as a adjunct to traditional medical therapy/treatment strategy for sleep apnea and prediabetes.  Medications reviewed. He continues to take the Naltrexone and feels it is helping him.     Interval history: He recently began taking fish oil twice a day and has noticed increased right knee pain.     Concerns or questions: None    Assess: Weight is down 23 pounds since beginning the program and overall he is feeling good.     Nutrition Review: He is drinking plenty of water. He continues to do nutrasystem for breakfast and lunch and lean and green for dinner and feels full. He is not snacking at night.     Physical activity review: He has been walking on the nice days and did wrake leaves yesterday for 1.5 hours.     Emotional Wellness review: Stress is low.     Sleep review: He is getrting 6 hours of sleep/night and wears CPAP faithfully.     Advise/Agree: He will continue to follow the plan and increase his physical activity with [employer] or [employer]  on you tube.     Arrange: Will follow up in two weeks.     LABS / IMAGING / EKG        Reviewed the following Labs:    No results found for: HGBA1C  No results found for: CHOL  No results found for: HDL  No results found for: LDLCALC  No results found for: TRIG  No results found for: CHOLHDL      ASSESSMENT AND PLAN          Class 2 severe obesity with serious comorbidity and body mass index (BMI) of 38.0 to 38.9 in adult (CMS/[facility])  Continue to follow LGI plan.    Continue to increase your physical activity as tolerated with knee pain and weather.     Look into [employer] with [employer] on you tube or fitness  on you tube.     Body mass index 38.0-38.9, adult  See plan outlines in obesity section.     Educational and counseling on strategies for lifestyle change to address weight related health conditions over 1/2 of todays 20 minute appointment.     THOMAS Eldridge  12/14/2020     Thank you very much for allowing us to participate in the care of your patient. Please do not hesitate to call or email if there are any questions.

## 2020-12-14 NOTE — ASSESSMENT & PLAN NOTE
Continue to follow LGI plan.    Continue to increase your physical activity as tolerated with knee pain and weather.     Look into [employer] or fitness  on you tube.

## 2020-12-30 ENCOUNTER — OFFICE VISIT (OUTPATIENT)
Dept: BARIATRICS/WEIGHT MGMT | Facility: CLINIC | Age: 75
End: 2020-12-30
Payer: MEDICARE

## 2020-12-30 VITALS
BODY MASS INDEX: 35.49 KG/M2 | SYSTOLIC BLOOD PRESSURE: 130 MMHG | DIASTOLIC BLOOD PRESSURE: 80 MMHG | WEIGHT: 262.06 LBS

## 2020-12-30 DIAGNOSIS — E66.812 CLASS 2 SEVERE OBESITY WITH SERIOUS COMORBIDITY AND BODY MASS INDEX (BMI) OF 38.0 TO 38.9 IN ADULT, UNSPECIFIED OBESITY TYPE (CMS/HCC): ICD-10-CM

## 2020-12-30 DIAGNOSIS — E66.01 CLASS 2 SEVERE OBESITY WITH SERIOUS COMORBIDITY AND BODY MASS INDEX (BMI) OF 38.0 TO 38.9 IN ADULT, UNSPECIFIED OBESITY TYPE (CMS/HCC): ICD-10-CM

## 2020-12-30 PROCEDURE — 99999 PR OFFICE/OUTPT VISIT,PROCEDURE ONLY: CPT | Performed by: NURSE PRACTITIONER

## 2020-12-30 PROCEDURE — G0447 BEHAVIOR COUNSEL OBESITY 15M: HCPCS | Performed by: NURSE PRACTITIONER

## 2020-12-30 RX ORDER — MELOXICAM 15 MG/1
15 TABLET ORAL
COMMUNITY
Start: 2020-12-16 | End: 2021-03-15 | Stop reason: DRUGHIGH

## 2020-12-30 NOTE — ASSESSMENT & PLAN NOTE
Continue to follow LGI plan focusing on protein and water intake.     Increase your physical activity as tolerated.

## 2020-12-30 NOTE — PROGRESS NOTES
DETAILS OF VISIT     Consulting Service:  [facility] Comprehensive Weight and Wellness Program    Referring Physician: Kamran Wilson MD    Reason for Consultation:   Chief Complaint   Patient presents with   • Follow-up       PCP: Kamran Wilson MD   HISTORY OF PRESENT ILLNESS        Sudheer Mayorga is a 75 y.o. male actively treated for prediabetes and sleep apnea.    PAST MEDICAL AND SURGICAL HISTORY        Past Medical History:   Diagnosis Date   • Allergic rhinitis    • Asthma    • Depression    • Knee tendinitis     right   • Lipid disorder    • HONEY on CPAP    • Prediabetes        Past Surgical History:   Procedure Laterality Date   • HERNIA REPAIR     • ROTATOR CUFF REPAIR         MEDICATIONS        Current Outpatient Medications on File Prior to Visit   Medication Sig Dispense Refill   • albuterol HFA (VENTOLIN HFA) 90 mcg/actuation inhaler INHALE 1 PUFF BY MOUTH EVERY 4 HOURS AS NEEDED     • aspirin (ASPIR-81) 81 mg enteric coated tablet Take 1 tablet (81 mg total) by mouth daily. 90 tablet 3   • atorvastatin (LIPITOR) 20 mg tablet Take 20 mg by mouth once daily.     • BREO ELLIPTA 200-25 mcg/dose blister with device powder for inhalation as needed.       • buPROPion XL (WELLBUTRIN XL) 150 mg 24 hr tablet Take 1 tablet (150 mg total) by mouth daily. 90 tablet 0   • coenzyme Q10 (COQ10) 100 mg capsule Take 100 mg by mouth daily.     • meloxicam (MOBIC) 15 mg tablet Take 15 mg by mouth once daily.     • metFORMIN (GLUCOPHAGE) 500 mg tablet TAKE 1 TABLET BY MOUTH EVERY MORNING BEFORE BREAKFAST.     • multivit-min/folic/vit K/lycop (ONE-A-DAY MEN'S MULTIVITAMIN ORAL) Take by mouth.     • naltrexone (DEPADE) 50 mg tablet Take 25 mg by mouth daily.     • traZODone (DESYREL) 50 mg tablet Take by mouth as needed.         No current facility-administered medications on file prior to visit.        ALLERGIES        Patient has no known allergies.    SOCIAL HISTORY        Social History     Tobacco Use   • Smoking status:  Never Smoker   • Smokeless tobacco: Never Used   Substance Use Topics   • Alcohol use: Yes     Comment: social   • Drug use: Never       REVIEW OF SYSTEMS      Review of Systems    Review of Systems     PHYSICAL EXAMINATION      Visit Vitals  /80   Wt 119 kg (262 lb 1 oz)   BMI 35.49 kg/m²      Body mass index is 35.49 kg/m².    BP Readings from Last 3 Encounters:   12/30/20 130/80   12/14/20 128/60   11/30/20 122/78     Wt Readings from Last 3 Encounters:   12/30/20 119 kg (262 lb 1 oz)   12/14/20 120 kg (264 lb 2 oz)   11/30/20 122 kg (268 lb 9 oz)       Physical Exam  Patient  is here today to follow up on using lifestyle modification and weight loss as a adjunct to traditional medical therapy/treatment strategy for prediabetes and sleep apnea.  Medications reviewed. He continues to take Naltrexone and feels it is helping.     Ask: He feels good and thinks the plan is working well for him.     Assess: Weight is down 25 pounds since beginning the program.     Nutrition review: He continues to focus on getting plenty of protein and water. He is eating nutrisystem for breakfast and lunch and two snacks. He has completely eliminated snapple and diet soda.     Physical activity review: No dedicated exercise at this point and has not been to the gym due to Covid. He is very active at home and keeps busy around the house.    Emotional Wellness review: He is going to [place] next month and is going to drive due to not wanting to fly. He is happy to be planning a get away.     Sleep review:  Sleep is not great. He only gets 5 hours of sleep/night. He does wear CPAP every night. He may try Melatonin to assist in getting more sleep. He reports that he is not usually  tired during the day. He may take a nap around dinner time if he is tired.      Advise/Agree: Continue to focus on getting plenty of Protein and water      Arrange: Will follow up in two weeks.       LABS / IMAGING / EKG        Reviewed the following  Labs:    No results found for: HGBA1C  No results found for: CHOL  No results found for: HDL  No results found for: LDLCALC  No results found for: TRIG  No results found for: CHOLHDL      ASSESSMENT AND PLAN         Class 2 severe obesity with serious comorbidity and body mass index (BMI) of 38.0 to 38.9 in adult (CMS/[facility])  Continue to follow LGI plan focusing on protein and water intake.     Increase your physical activity as tolerated.     Educational and counseling on strategies for lifestyle change to address weight related health conditions over 1/2 of todays 20 minute appointment.     THOMAS Eldridge  12/30/2020     Thank you very much for allowing us to participate in the care of your patient. Please do not hesitate to call or email if there are any questions.

## 2020-12-30 NOTE — PATIENT INSTRUCTIONS
Problem List Items Addressed This Visit        Endocrine/Metabolic    Class 2 severe obesity with serious comorbidity and body mass index (BMI) of 38.0 to 38.9 in adult (CMS/[facility])     Continue to follow LGI plan focusing on protein and water intake.     Increase your physical activity as tolerated.             Other    Body mass index 38.0-38.9, adult - Primary     See plan outlined in the obesity section.

## 2021-01-08 RX ORDER — BUPROPION HYDROCHLORIDE 150 MG/1
TABLET ORAL
Qty: 90 TABLET | Refills: 0 | Status: SHIPPED | OUTPATIENT
Start: 2021-01-08 | End: 2021-02-04 | Stop reason: SDUPTHER

## 2021-01-11 ENCOUNTER — OFFICE VISIT (OUTPATIENT)
Dept: BARIATRICS/WEIGHT MGMT | Facility: CLINIC | Age: 76
End: 2021-01-11
Payer: MEDICARE

## 2021-01-11 VITALS
DIASTOLIC BLOOD PRESSURE: 78 MMHG | BODY MASS INDEX: 35.65 KG/M2 | WEIGHT: 263.19 LBS | SYSTOLIC BLOOD PRESSURE: 122 MMHG

## 2021-01-11 DIAGNOSIS — E66.812 CLASS 2 SEVERE OBESITY WITH SERIOUS COMORBIDITY AND BODY MASS INDEX (BMI) OF 38.0 TO 38.9 IN ADULT, UNSPECIFIED OBESITY TYPE (CMS/HCC): ICD-10-CM

## 2021-01-11 DIAGNOSIS — E66.01 CLASS 2 SEVERE OBESITY WITH SERIOUS COMORBIDITY AND BODY MASS INDEX (BMI) OF 38.0 TO 38.9 IN ADULT, UNSPECIFIED OBESITY TYPE (CMS/HCC): ICD-10-CM

## 2021-01-11 PROCEDURE — 99999 PR OFFICE/OUTPT VISIT,PROCEDURE ONLY: CPT | Performed by: NURSE PRACTITIONER

## 2021-01-11 RX ORDER — NALTREXONE HYDROCHLORIDE 50 MG/1
25 TABLET, FILM COATED ORAL DAILY
Qty: 90 TABLET | Refills: 0 | Status: SHIPPED | OUTPATIENT
Start: 2021-01-11 | End: 2021-02-04 | Stop reason: SDUPTHER

## 2021-01-11 NOTE — PROGRESS NOTES
DETAILS OF VISIT     Consulting Service:  [facility] Comprehensive Weight and Wellness Program    Referring Physician: Kamran Wilson MD    Reason for Consultation:   Chief Complaint   Patient presents with   • Follow-up       PCP: Kamran Wilson MD   HISTORY OF PRESENT ILLNESS        Sudheer Mayorga is a 75 y.o. male actively treated for prediabetes.    PAST MEDICAL AND SURGICAL HISTORY        Past Medical History:   Diagnosis Date   • Allergic rhinitis    • Asthma    • Depression    • Knee tendinitis     right   • Lipid disorder    • HONEY on CPAP    • Prediabetes        Past Surgical History:   Procedure Laterality Date   • HERNIA REPAIR     • ROTATOR CUFF REPAIR         MEDICATIONS        Current Outpatient Medications on File Prior to Visit   Medication Sig Dispense Refill   • albuterol HFA (VENTOLIN HFA) 90 mcg/actuation inhaler INHALE 1 PUFF BY MOUTH EVERY 4 HOURS AS NEEDED     • aspirin (ASPIR-81) 81 mg enteric coated tablet Take 1 tablet (81 mg total) by mouth daily. 90 tablet 3   • atorvastatin (LIPITOR) 20 mg tablet Take 20 mg by mouth once daily.     • BREO ELLIPTA 200-25 mcg/dose blister with device powder for inhalation as needed.       • buPROPion XL (WELLBUTRIN XL) 150 mg 24 hr tablet TAKE 1 TABLET BY MOUTH EVERY DAY 90 tablet 0   • coenzyme Q10 (COQ10) 100 mg capsule Take 100 mg by mouth daily.     • meloxicam (MOBIC) 15 mg tablet Take 15 mg by mouth once daily.     • metFORMIN (GLUCOPHAGE) 500 mg tablet TAKE 1 TABLET BY MOUTH EVERY MORNING BEFORE BREAKFAST.     • multivit-min/folic/vit K/lycop (ONE-A-DAY MEN'S MULTIVITAMIN ORAL) Take by mouth.     • traZODone (DESYREL) 50 mg tablet Take by mouth as needed.         No current facility-administered medications on file prior to visit.        ALLERGIES        Patient has no known allergies.    SOCIAL HISTORY        Social History     Tobacco Use   • Smoking status: Never Smoker   • Smokeless tobacco: Never Used   Substance Use Topics   • Alcohol use: Yes      Comment: social   • Drug use: Never       REVIEW OF SYSTEMS      Review of Systems    Review of Systems     PHYSICAL EXAMINATION      Visit Vitals  /78   Wt 119 kg (263 lb 3 oz)   BMI 35.65 kg/m²      Body mass index is 35.65 kg/m².      BP Readings from Last 3 Encounters:   01/11/21 122/78   12/30/20 130/80   12/14/20 128/60     Wt Readings from Last 3 Encounters:   01/11/21 119 kg (263 lb 3 oz)   12/30/20 119 kg (262 lb 1 oz)   12/14/20 120 kg (264 lb 2 oz)       Physical Exam  Patient is here today to follow up on using lifestyle modification and weight loss as adjunct to traditional medical therapy/treatment strategy for prediabetes.  Medications reviewed. He continues to take Naltrexone 25 mg and reports it helps curb his appetite.     Ask: He has an appointment th Dr. Bradshaw, Orthpedic Surgeon tomorrorw for his right knee. He continues to have pain and is taking Meloxicam which does help reduce his pain.     Assess: Weight today is up one pound since our last visit.     Nutrition Review: He is still logging his food an the violet.He feels he has his food intake under control. He continues to eat Nutrasystem 2 meals, 2 snacks and one lean and green meal/day.      Physical Activity Review: He is not currently exercising but is keeping active at home.     Emotional Wellness Review: He feels happy.     Sleep Review: Sleep is better and he is getting more rest.     Advise/Agree: He has agreed to add strength training 1-2 x/week.     Arrange:Will follow up in two weeks.       LABS / IMAGING / EKG        Reviewed the following Labs:    No results found for: HGBA1C  No results found for: CHOL  No results found for: HDL  No results found for: LDLCALC  No results found for: TRIG  No results found for: CHOLHDL      ASSESSMENT AND PLAN         Body mass index 38.0-38.9, adult  See plan outlined in obesity section.     Class 2 severe obesity with serious comorbidity and body mass index (BMI) of 38.0 to 38.9 in adult  (CMS/[facility])  Continue to follow LGI and Nutrisystem 2 meals and 2 snacks daily.    Continue to focus on food type, timing and teamwork. Encouraged adequate protein intake to reduce hunger, low insulin inducing foods to balance insulin  and blood sugar. Encouraged adequate cardiovascular activity and increased nonexercise activity daily. Continue to balance stress with relaxation techniques and aim for adequate sleep nightly.     Increase your physical activity with resistance training 1-2 x/week .      Educational and counseling on strategies for lifestyle change to address weight related health conditions over 1/2 of todays 20 minute appointment.     THOMAS Eldridge  1/11/2021     Thank you very much for allowing us to participate in the care of your patient. Please do not hesitate to call or email if there are any questions.

## 2021-01-11 NOTE — PATIENT INSTRUCTIONS
Problem List Items Addressed This Visit        Endocrine/Metabolic    Class 2 severe obesity with serious comorbidity and body mass index (BMI) of 38.0 to 38.9 in adult (CMS/[facility])     Continue to follow LGI and Nutrisystem 2 meals and 2 snacks daily.    Continue to focus on food type, timing and teamwork. Encouraged adequate protein intake to reduce hunger, low insulin inducing foods to balance insulin  and blood sugar. Encouraged adequate cardiovascular activity and increased nonexercise activity daily. Continue to balance stress with relaxation techniques and aim for adequate sleep nightly.     Increase your physical activity with resistance training 1-2 x/week .              Other    Body mass index 38.0-38.9, adult - Primary     See plan outlined in obesity section.

## 2021-01-11 NOTE — ASSESSMENT & PLAN NOTE
Continue to follow LGI and Nutrisystem 2 meals and 2 snacks daily.    Continue to focus on food type, timing and teamwork. Encouraged adequate protein intake to reduce hunger, low insulin inducing foods to balance insulin  and blood sugar. Encouraged adequate cardiovascular activity and increased nonexercise activity daily. Continue to balance stress with relaxation techniques and aim for adequate sleep nightly.     Increase your physical activity with resistance training 1-2 x/week .

## 2021-01-15 ENCOUNTER — TELEPHONE (OUTPATIENT)
Dept: BARIATRICS/WEIGHT MGMT | Facility: CLINIC | Age: 76
End: 2021-01-15

## 2021-01-24 NOTE — PROGRESS NOTES
DETAILS OF VISIT     Consulting Service:  [facility] Comprehensive Weight and Wellness Program    Referring Physician: No ref. provider found    Reason for Consultation:   Chief Complaint   Patient presents with   • Follow-up       PCP: Kamran Wilson MD   HISTORY OF PRESENT ILLNESS        Sudheer Mayorga is a 75 y.o. male actively treated for prediabetes and sleep apnea.     PAST MEDICAL AND SURGICAL HISTORY        Past Medical History:   Diagnosis Date   • Allergic rhinitis    • Asthma    • Depression    • Knee tendinitis     right   • Lipid disorder    • HONEY on CPAP    • Prediabetes        Past Surgical History:   Procedure Laterality Date   • HERNIA REPAIR     • ROTATOR CUFF REPAIR         MEDICATIONS        Current Outpatient Medications on File Prior to Visit   Medication Sig Dispense Refill   • albuterol HFA (VENTOLIN HFA) 90 mcg/actuation inhaler INHALE 1 PUFF BY MOUTH EVERY 4 HOURS AS NEEDED     • aspirin (ASPIR-81) 81 mg enteric coated tablet Take 1 tablet (81 mg total) by mouth daily. 90 tablet 3   • atorvastatin (LIPITOR) 20 mg tablet Take 20 mg by mouth once daily.     • BREO ELLIPTA 200-25 mcg/dose blister with device powder for inhalation as needed.       • buPROPion XL (WELLBUTRIN XL) 150 mg 24 hr tablet TAKE 1 TABLET BY MOUTH EVERY DAY 90 tablet 0   • coenzyme Q10 (COQ10) 100 mg capsule Take 100 mg by mouth daily.     • meloxicam (MOBIC) 15 mg tablet Take 15 mg by mouth once daily.     • metFORMIN (GLUCOPHAGE) 500 mg tablet TAKE 1 TABLET BY MOUTH EVERY MORNING BEFORE BREAKFAST.     • multivit-min/folic/vit K/lycop (ONE-A-DAY MEN'S MULTIVITAMIN ORAL) Take by mouth.     • naltrexone (DEPADE) 50 mg tablet Take 0.5 tablets (25 mg total) by mouth daily. 90 tablet 0   • traZODone (DESYREL) 50 mg tablet Take by mouth as needed.         No current facility-administered medications on file prior to visit.        ALLERGIES        Patient has no known allergies.    SOCIAL HISTORY        Social History     Tobacco Use    • Smoking status: Never Smoker   • Smokeless tobacco: Never Used   Substance Use Topics   • Alcohol use: Yes     Comment: social   • Drug use: Never       REVIEW OF SYSTEMS      Review of Systems    Review of Systems     PHYSICAL EXAMINATION      Visit Vitals  /80   Wt 118 kg (260 lb)   BMI 35.21 kg/m²      Body mass index is 35.21 kg/m².      BP Readings from Last 3 Encounters:   01/25/21 132/80   01/11/21 122/78   12/30/20 130/80     Wt Readings from Last 3 Encounters:   01/25/21 118 kg (260 lb)   01/11/21 119 kg (263 lb 3 oz)   12/30/20 119 kg (262 lb 1 oz)       Physical Exam  Patient is here today to follow up on using lifestyle modification and weight loss as adjunct to traditional medical therapy/treatment strategy for prediabetes and sleep apnea.  Medications reviewed. No medication changes. He continue to take Naltrexone.     Ask:He is concerned with obtaining the covid vaccine is trying hard to obtain it.     Assess: He feels good and is down 17 pounds since beginning the program.     Nutrition Review: He continues to eat 2 nutrisystem meal replacements and 2 snacks with a lean and green dinner.     Physical Activity Review: No dedicated exercise.     Emotional Wellness Review: He is happy and feels good.     Sleep Review:No sleep concerns.     Advise/Agree: He has agreed to increase his physical activity with walks and will go back to the [facility] after he is vaccinated.     Arrange: Will follow up in two weeks.       LABS / IMAGING / EKG        Reviewed the following Labs:    No results found for: HGBA1C  No results found for: CHOL  No results found for: HDL  No results found for: LDLCALC  No results found for: TRIG  No results found for: CHOLHDL      ASSESSMENT AND PLAN         Class 2 severe obesity with serious comorbidity and body mass index (BMI) of 38.0 to 38.9 in adult (CMS/[facility])  Continue to follow 2 Nutrisystem meals and 2 snacks/day with dinner lean and green.     Increase physical activity  with walks when the weather improves         Body mass index 38.0-38.9, adult  See plan outlined in obesity section.     Educational and counseling on strategies for lifestyle change to address weight related health conditions over 1/2 of todays 20 minute appointment.     THOMAS Eldridge  1/25/2021     Thank you very much for allowing us to participate in the care of your patient. Please do not hesitate to call or email if there are any questions.

## 2021-01-25 ENCOUNTER — OFFICE VISIT (OUTPATIENT)
Dept: BARIATRICS/WEIGHT MGMT | Facility: CLINIC | Age: 76
End: 2021-01-25
Payer: MEDICARE

## 2021-01-25 VITALS — DIASTOLIC BLOOD PRESSURE: 80 MMHG | SYSTOLIC BLOOD PRESSURE: 132 MMHG | BODY MASS INDEX: 35.21 KG/M2 | WEIGHT: 260 LBS

## 2021-01-25 DIAGNOSIS — E66.01 CLASS 2 SEVERE OBESITY WITH SERIOUS COMORBIDITY AND BODY MASS INDEX (BMI) OF 38.0 TO 38.9 IN ADULT, UNSPECIFIED OBESITY TYPE (CMS/HCC): ICD-10-CM

## 2021-01-25 DIAGNOSIS — E66.812 CLASS 2 SEVERE OBESITY WITH SERIOUS COMORBIDITY AND BODY MASS INDEX (BMI) OF 38.0 TO 38.9 IN ADULT, UNSPECIFIED OBESITY TYPE (CMS/HCC): ICD-10-CM

## 2021-01-25 PROCEDURE — G0447 BEHAVIOR COUNSEL OBESITY 15M: HCPCS | Performed by: NURSE PRACTITIONER

## 2021-01-25 PROCEDURE — 99999 PR OFFICE/OUTPT VISIT,PROCEDURE ONLY: CPT | Performed by: NURSE PRACTITIONER

## 2021-01-25 NOTE — ASSESSMENT & PLAN NOTE
Continue to follow 2 Nutrisystem meals and 2 snacks/day with dinner lean and green.     Increase physical activity with walks when the weather improves

## 2021-02-04 ENCOUNTER — OFFICE VISIT (OUTPATIENT)
Dept: BARIATRICS/WEIGHT MGMT | Facility: CLINIC | Age: 76
End: 2021-02-04
Payer: MEDICARE

## 2021-02-04 VITALS
HEART RATE: 77 BPM | WEIGHT: 257.9 LBS | BODY MASS INDEX: 34.93 KG/M2 | OXYGEN SATURATION: 97 % | SYSTOLIC BLOOD PRESSURE: 140 MMHG | RESPIRATION RATE: 16 BRPM | DIASTOLIC BLOOD PRESSURE: 77 MMHG

## 2021-02-04 DIAGNOSIS — E66.01 CLASS 2 SEVERE OBESITY WITH SERIOUS COMORBIDITY AND BODY MASS INDEX (BMI) OF 38.0 TO 38.9 IN ADULT, UNSPECIFIED OBESITY TYPE (CMS/HCC): ICD-10-CM

## 2021-02-04 DIAGNOSIS — E66.812 CLASS 2 SEVERE OBESITY WITH SERIOUS COMORBIDITY AND BODY MASS INDEX (BMI) OF 38.0 TO 38.9 IN ADULT, UNSPECIFIED OBESITY TYPE (CMS/HCC): ICD-10-CM

## 2021-02-04 DIAGNOSIS — R73.03 PREDIABETES: Primary | ICD-10-CM

## 2021-02-04 PROCEDURE — 99213 OFFICE O/P EST LOW 20 MIN: CPT | Performed by: FAMILY MEDICINE

## 2021-02-04 RX ORDER — BUPROPION HYDROCHLORIDE 150 MG/1
150 TABLET ORAL
Qty: 90 TABLET | Refills: 0 | Status: SHIPPED | OUTPATIENT
Start: 2021-02-04 | End: 2021-04-05

## 2021-02-04 RX ORDER — NALTREXONE HYDROCHLORIDE 50 MG/1
25 TABLET, FILM COATED ORAL DAILY
Qty: 90 TABLET | Refills: 0 | Status: SHIPPED | OUTPATIENT
Start: 2021-02-04 | End: 2021-04-15 | Stop reason: SDUPTHER

## 2021-02-04 RX ORDER — METFORMIN HYDROCHLORIDE 1000 MG/1
1000 TABLET ORAL
Qty: 90 TABLET | Refills: 0 | Status: SHIPPED | OUTPATIENT
Start: 2021-02-04 | End: 2021-04-27 | Stop reason: SDUPTHER

## 2021-02-04 NOTE — ASSESSMENT & PLAN NOTE
Continue to focus on food type timing and teamwork.  Encouraged adequate protein intake to reduce hunger, low insulin inducing foods to balance insulin and blood sugar.  Encouraged adequate cardiovascular activity and increased nonexercise activity daily.  Continue to balance stress with relaxation techniques and aim for adequate sleep nightly.    Continue on Metformin.

## 2021-02-04 NOTE — PROGRESS NOTES
Subjective     Patient ID: Sudheer Mayorga is a 75 y.o. male.    HPI  Patient  is here today to follow up on using lifestyle modification and weight loss as a adjunct to traditional medical therapy/treatment strategy for prediabetes and arthritis .  Medications reviewed for weight related conditions: wellbutrin, naltrexone, and metformin.  Reports very few  missed doses.  Reports good efficacy and tolerance.     Concerns or questions: Still having trouble with belly fat, lost 2 inches, but wants to further accelerate.     Nutrition Goals:Using nutrisystem.     Physical activity goals::Not at goal, unmotivated to work out at home and uncomfortable with going to the gym.     Wellbeing goals: No concerns.     Sleep goals: Meeting sleep goals.         Current Outpatient Medications on File Prior to Visit   Medication Sig Dispense Refill   • albuterol HFA (VENTOLIN HFA) 90 mcg/actuation inhaler INHALE 1 PUFF BY MOUTH EVERY 4 HOURS AS NEEDED     • aspirin (ASPIR-81) 81 mg enteric coated tablet Take 1 tablet (81 mg total) by mouth daily. 90 tablet 3   • atorvastatin (LIPITOR) 20 mg tablet Take 20 mg by mouth once daily.     • BREO ELLIPTA 200-25 mcg/dose blister with device powder for inhalation as needed.       • coenzyme Q10 (COQ10) 100 mg capsule Take 100 mg by mouth daily.     • meloxicam (MOBIC) 15 mg tablet Take 15 mg by mouth once daily.     • multivit-min/folic/vit K/lycop (ONE-A-DAY MEN'S MULTIVITAMIN ORAL) Take by mouth.     • traZODone (DESYREL) 50 mg tablet Take by mouth as needed.         No current facility-administered medications on file prior to visit.             Patient has no known allergies.             Review of Systems    Objective     Vitals:    02/04/21 0924   BP: 140/77   BP Location: Left upper arm   Patient Position: Sitting   Pulse: 77   Resp: 16   SpO2: 97%   Weight: 117 kg (257 lb 14.4 oz)     Body mass index is 34.93 kg/m².    Physical Exam    Assessment/Plan   Diagnoses and all orders for  this visit:    Class 2 severe obesity with serious comorbidity and body mass index (BMI) of 38.0 to 38.9 in adult, unspecified obesity type (CMS/HCC) (Primary)  Assessment & Plan:  Status:Excellent progress with weight loss.   Not struggling with hunger and eating smaller portions and feeling satisfied.      Challenges: Belly fat --- will plan to increase the metformin to 1000 mg a day from 500 mg a day    Nutrition Goals: No change to current structure.     Physical activity goals: Not currently doing any exercises. Plant o go back to the gym to do the circuits.      Medications and supplements: Continue with Wellbutrin and naltrexone.  Increase the metformin    I spent 20 minutes on this date of service performing the following activities: obtaining history, documenting and obtaining / reviewing records.          Prediabetes  Assessment & Plan:  Continue to focus on food type timing and teamwork.  Encouraged adequate protein intake to reduce hunger, low insulin inducing foods to balance insulin and blood sugar.  Encouraged adequate cardiovascular activity and increased nonexercise activity daily.  Continue to balance stress with relaxation techniques and aim for adequate sleep nightly.    Continue on Metformin.       Other orders  -     metFORMIN (GLUCOPHAGE) 1,000 mg tablet; Take 1 tablet (1,000 mg total) by mouth daily with breakfast.  -     buPROPion XL (WELLBUTRIN XL) 150 mg 24 hr tablet; Take 1 tablet (150 mg total) by mouth once daily.  -     naltrexone (DEPADE) 50 mg tablet; Take 0.5 tablets (25 mg total) by mouth daily.

## 2021-02-04 NOTE — PATIENT INSTRUCTIONS
Problem List Items Addressed This Visit        Endocrine/Metabolic    Prediabetes - Primary     Continue to focus on food type timing and teamwork.  Encouraged adequate protein intake to reduce hunger, low insulin inducing foods to balance insulin and blood sugar.  Encouraged adequate cardiovascular activity and increased nonexercise activity daily.  Continue to balance stress with relaxation techniques and aim for adequate sleep nightly.    Continue on Metformin.          Class 2 severe obesity with serious comorbidity and body mass index (BMI) of 38.0 to 38.9 in adult (CMS/[facility])     Status:Excellent progress with weight loss.   Not struggling with hunger and eating smaller portions and feeling satisfied.      Challenges: Belly fat --- will plan to increase the metformin to 1000 mg a day from 500 mg a day    Nutrition Goals: No change to current structure.     Physical activity goals: Not currently doing any exercises. Plant o go back to the gym to do the circuits.      Medications and supplements: Continue with Wellbutrin and naltrexone.  Increase the metformin    I spent 20 minutes on this date of service performing the following activities: obtaining history, documenting and obtaining / reviewing records.

## 2021-02-04 NOTE — ASSESSMENT & PLAN NOTE
Status:Excellent progress with weight loss.   Not struggling with hunger and eating smaller portions and feeling satisfied.      Challenges: Belly fat --- will plan to increase the metformin to 1000 mg a day from 500 mg a day    Nutrition Goals: No change to current structure.     Physical activity goals: Not currently doing any exercises. Plant o go back to the gym to do the circuits.      Medications and supplements: Continue with Wellbutrin and naltrexone.  Increase the metformin    I spent 20 minutes on this date of service performing the following activities: obtaining history, documenting and obtaining / reviewing records.

## 2021-02-15 ENCOUNTER — OFFICE VISIT (OUTPATIENT)
Dept: BARIATRICS/WEIGHT MGMT | Facility: CLINIC | Age: 76
End: 2021-02-15
Payer: MEDICARE

## 2021-02-15 VITALS
WEIGHT: 255.06 LBS | BODY MASS INDEX: 34.54 KG/M2 | SYSTOLIC BLOOD PRESSURE: 118 MMHG | DIASTOLIC BLOOD PRESSURE: 78 MMHG

## 2021-02-15 DIAGNOSIS — E66.01 CLASS 2 SEVERE OBESITY WITH SERIOUS COMORBIDITY AND BODY MASS INDEX (BMI) OF 38.0 TO 38.9 IN ADULT, UNSPECIFIED OBESITY TYPE (CMS/HCC): ICD-10-CM

## 2021-02-15 DIAGNOSIS — E66.812 CLASS 2 SEVERE OBESITY WITH SERIOUS COMORBIDITY AND BODY MASS INDEX (BMI) OF 38.0 TO 38.9 IN ADULT, UNSPECIFIED OBESITY TYPE (CMS/HCC): ICD-10-CM

## 2021-02-15 PROCEDURE — 99999 PR OFFICE/OUTPT VISIT,PROCEDURE ONLY: CPT | Performed by: NURSE PRACTITIONER

## 2021-02-15 NOTE — ASSESSMENT & PLAN NOTE
Great job with increasing your exercise routine!!    Continue to follow the plan as it is working for you!    No change in medications.

## 2021-02-15 NOTE — PROGRESS NOTES
DETAILS OF VISIT     Consulting Service:  [facility] Comprehensive Weight and Wellness Program    Referring Physician: No ref. provider found    Reason for Consultation:   Chief Complaint   Patient presents with   • Follow-up       PCP: Kamran Wilson MD   HISTORY OF PRESENT ILLNESS        Sudheer Mayorga is a 75 y.o. male actively treated for prediabetes.    PAST MEDICAL AND SURGICAL HISTORY        Past Medical History:   Diagnosis Date   • Allergic rhinitis    • Asthma    • Depression    • Knee tendinitis     right   • Lipid disorder    • HONEY on CPAP    • Prediabetes        Past Surgical History:   Procedure Laterality Date   • HERNIA REPAIR     • ROTATOR CUFF REPAIR         MEDICATIONS        Current Outpatient Medications on File Prior to Visit   Medication Sig Dispense Refill   • albuterol HFA (VENTOLIN HFA) 90 mcg/actuation inhaler INHALE 1 PUFF BY MOUTH EVERY 4 HOURS AS NEEDED     • aspirin (ASPIR-81) 81 mg enteric coated tablet Take 1 tablet (81 mg total) by mouth daily. 90 tablet 3   • atorvastatin (LIPITOR) 20 mg tablet Take 20 mg by mouth once daily.     • BREO ELLIPTA 200-25 mcg/dose blister with device powder for inhalation as needed.       • buPROPion XL (WELLBUTRIN XL) 150 mg 24 hr tablet Take 1 tablet (150 mg total) by mouth once daily. 90 tablet 0   • coenzyme Q10 (COQ10) 100 mg capsule Take 100 mg by mouth daily.     • meloxicam (MOBIC) 15 mg tablet Take 15 mg by mouth once daily.     • metFORMIN (GLUCOPHAGE) 1,000 mg tablet Take 1 tablet (1,000 mg total) by mouth daily with breakfast. 90 tablet 0   • multivit-min/folic/vit K/lycop (ONE-A-DAY MEN'S MULTIVITAMIN ORAL) Take by mouth.     • naltrexone (DEPADE) 50 mg tablet Take 0.5 tablets (25 mg total) by mouth daily. 90 tablet 0   • traZODone (DESYREL) 50 mg tablet Take by mouth as needed.         No current facility-administered medications on file prior to visit.        ALLERGIES        Patient has no known allergies.    SOCIAL HISTORY        Social  History     Tobacco Use   • Smoking status: Never Smoker   • Smokeless tobacco: Never Used   Substance Use Topics   • Alcohol use: Yes     Comment: social   • Drug use: Never       REVIEW OF SYSTEMS      Review of Systems    Review of Systems     PHYSICAL EXAMINATION      Visit Vitals  /78   Wt 116 kg (255 lb 1 oz)   BMI 34.54 kg/m²      Body mass index is 34.54 kg/m².      BP Readings from Last 3 Encounters:   02/15/21 118/78   02/04/21 140/77   01/25/21 132/80     Wt Readings from Last 3 Encounters:   02/15/21 116 kg (255 lb 1 oz)   02/04/21 117 kg (257 lb 14.4 oz)   01/25/21 118 kg (260 lb)       Physical Exam  Patient is here today to follow up on using lifestyle modification and weight loss as adjunct to traditional medical therapy/treatment strategy for prediabetes.  Medications reviewed. He has recently increased his Metformin to 1000 mg daily and denies any side effects.     Ask:He fell on the ice and sprained his ankle but it is getting better.     Assess: Weight today 255.1 pounds and is down 32.5 pounds since beginning the program.     Nutrition Review: He continues to make healthy choices. He does eat off plan once a week and has a bagel.     Physical Activity Review: He recently joined the [employer] and went three times and felt good. He did 30 minutes of cardio and some strength training. He is upset that members were not wearing masks properly and he is going to speak with the manager about it today.     Emotional Wellness Review: No issues.    Sleep Review: He is sleeping well.     Advise/Agree:He is going to continue to follow the plan as it is working for him.     Arrange: Will follow up in two weeks.     LABS / IMAGING / EKG        Reviewed the following Labs:    No results found for: HGBA1C  No results found for: CHOL  No results found for: HDL  No results found for: LDLCALC  No results found for: TRIG  No results found for: CHOLHDL      ASSESSMENT AND PLAN         Body mass index  38.0-38.9, adult  See plan outlined in obesity section.     Class 2 severe obesity with serious comorbidity and body mass index (BMI) of 38.0 to 38.9 in adult (CMS/[facility])  Great job with increasing your exercise routine!!    Continue to follow the plan as it is working for you!    No change in medications.    Educational and counseling on strategies for lifestyle change to address weight related health conditions over 1/2 of todays 20 minute appointment.     THOMAS Eldridge  2/15/2021     Thank you very much for allowing us to participate in the care of your patient. Please do not hesitate to call or email if there are any questions.

## 2021-02-15 NOTE — PATIENT INSTRUCTIONS
Problem List Items Addressed This Visit        Endocrine/Metabolic    Class 2 severe obesity with serious comorbidity and body mass index (BMI) of 38.0 to 38.9 in adult (CMS/[facility])     Great job with increasing your exercise routine!!    Continue to follow the plan as it is working for you!    No change in medications.            Other    Body mass index 38.0-38.9, adult - Primary     See plan outlined in obesity section.

## 2021-02-16 ENCOUNTER — TRANSCRIBE ORDERS (OUTPATIENT)
Dept: SCHEDULING | Age: 76
End: 2021-02-16

## 2021-02-16 ENCOUNTER — HOSPITAL ENCOUNTER (OUTPATIENT)
Dept: RADIOLOGY | Age: 76
Discharge: HOME | End: 2021-02-16
Attending: PODIATRIST
Payer: MEDICARE

## 2021-02-16 DIAGNOSIS — M76.821 POSTERIOR TIBIAL TENDINITIS, RIGHT LEG: Primary | ICD-10-CM

## 2021-02-16 DIAGNOSIS — M76.821 POSTERIOR TIBIAL TENDINITIS, RIGHT LEG: ICD-10-CM

## 2021-02-23 ENCOUNTER — TELEPHONE (OUTPATIENT)
Dept: BARIATRICS/WEIGHT MGMT | Facility: CLINIC | Age: 76
End: 2021-02-23

## 2021-03-14 NOTE — PROGRESS NOTES
DETAILS OF VISIT     Consulting Service:  [facility]    Referring Physician: Kamran Wilson MD    Reason for Consultation:   Chief Complaint   Patient presents with   • Follow-up       PCP: Kamran Wilson MD   HISTORY OF PRESENT ILLNESS        Sudheer Mayorga is a 75 y.o. male actively treated for prediabetes.    PAST MEDICAL AND SURGICAL HISTORY        Past Medical History:   Diagnosis Date   • Allergic rhinitis    • Asthma    • Depression    • Knee tendinitis     right   • Lipid disorder    • HONEY on CPAP    • Prediabetes        Past Surgical History:   Procedure Laterality Date   • HERNIA REPAIR     • ROTATOR CUFF REPAIR         MEDICATIONS        Current Outpatient Medications on File Prior to Visit   Medication Sig Dispense Refill   • albuterol HFA (VENTOLIN HFA) 90 mcg/actuation inhaler INHALE 1 PUFF BY MOUTH EVERY 4 HOURS AS NEEDED     • aspirin (ASPIR-81) 81 mg enteric coated tablet Take 1 tablet (81 mg total) by mouth daily. 90 tablet 3   • atorvastatin (LIPITOR) 20 mg tablet Take 20 mg by mouth once daily.     • BREO ELLIPTA 200-25 mcg/dose blister with device powder for inhalation as needed.       • buPROPion XL (WELLBUTRIN XL) 150 mg 24 hr tablet Take 1 tablet (150 mg total) by mouth once daily. 90 tablet 0   • coenzyme Q10 (COQ10) 100 mg capsule Take 100 mg by mouth daily.     • meloxicam (MOBIC) 7.5 mg tablet TAKE 1 TABLET BY MOUTH EVERY DAY DAILY ORALLY 30 DAYS     • metFORMIN (GLUCOPHAGE) 1,000 mg tablet Take 1 tablet (1,000 mg total) by mouth daily with breakfast. 90 tablet 0   • multivit-min/folic/vit K/lycop (ONE-A-DAY MEN'S MULTIVITAMIN ORAL) Take by mouth.     • naltrexone (DEPADE) 50 mg tablet Take 0.5 tablets (25 mg total) by mouth daily. 90 tablet 0   • traZODone (DESYREL) 50 mg tablet Take by mouth as needed.         No current facility-administered medications on file prior to visit.        ALLERGIES        Patient has no known allergies.    SOCIAL HISTORY         Social History     Tobacco Use   • Smoking status: Never Smoker   • Smokeless tobacco: Never Used   Substance Use Topics   • Alcohol use: Yes     Comment: social   • Drug use: Never       REVIEW OF SYSTEMS      Review of Systems    Review of Systems     PHYSICAL EXAMINATION      Visit Vitals  /80   Wt 115 kg (252 lb 9 oz)   BMI 34.21 kg/m²      Body mass index is 34.21 kg/m².      BP Readings from Last 3 Encounters:   03/15/21 128/80   02/15/21 118/78   02/04/21 140/77     Wt Readings from Last 3 Encounters:   03/15/21 115 kg (252 lb 9 oz)   02/15/21 116 kg (255 lb 1 oz)   02/04/21 117 kg (257 lb 14.4 oz)       Physical Exam  Patient is here today to follow up on using lifestyle modification and weight loss as adjunct to traditional medical therapy/treatment strategy for prediabetes.  Medications reviewed. He continues to take Naltrexone and Metformin as ordered.     Ask: He feels great and is happy he received his Covid vaccine on Friday and he denies any side effects.     Assess: He is down 35 pounds since beginning the program. He recently had labs done and his Hgb A1C is 5.8.     Nutrition Review: He continues to eat 2 nutrasystem meal replacements daily and one lean and green meal. He is only drinking about 4 glasses of water/day.     Physical Activity Review: He has not been able to exercise due to right ankle tendon injury. He just got off the boot last week. He is planning on going back to [employer] after his second Covid vaccine.     Emotional Wellness Review: He is happy and denies any problems emotionally.     Sleep Review:He is sleeping 5-6 hours/night and is going to try taking Melatonin again.     Advise/Agree: He has agreed to follow the plan as it is working for him     Arrange:Will follow up in 2 weeks.         LABS / IMAGING / EKG        Reviewed the following Labs:    No results found for: HGBA1C  No results found for: CHOL  No results found for: HDL  No results found for:  LDLCALC  No results found for: TRIG  No results found for: CHOLHDL      ASSESSMENT AND PLAN         Class 2 severe obesity with serious comorbidity and body mass index (BMI) of 38.0 to 38.9 in adult (CMS/[facility])  No changes in your plan as it is working for you.    Increase your physical activity as tolerated with your ankle injury.     Increase water from 4 glasses to 5/day. Continue to use Stur for flavoring.     Focus on hydration, primarily from plain water. An appropriate goal for most patients is 64 fluid ounces daily to support metabolic processes. Beware that caffeinated drinks do not count towards your fluid intake since they have a mild diuretic effect. Avoid beverages sweetened with sugar or artificial sweeteners such as sucralose or aspartame.     If you do not like water, consider supplementing with the following dietitian-approved beverage options that are unsweetened or sweetened with stevia, monk fruit, or erythritol: herb or fruit infused water (homemade, Hint, [employer] Wonder Water), flavored or unflavored seltzer water, decaf herbal tea (hot or iced), SweetLeaf or Stur brand water drops, Crystal Light Pure powdered drink mix, True Citrus flavor crystals, Vitamin Water Zero, Roar Organic Electrolyte Infusion, Kari, Body Armor Lyte, or Zevia.     Other strategies to help increase fluid intake:  - Set a timer on your phone, watch, or work calendar to cue you to drink  - Carry a reusable water bottle with you while working or running errands  - Hydrate before, during, and after physical activity  - Drink from a straw [unless you are a post-op bariatric surgery patient]  - Purchase an insulated cup or bottle to keep your beverage at your preferred drinking temperature  - Choose cold liquids when the weather is warm and warm beverages when the weather is cold  - Commit to drinking 8-16 ounces of water with each meal or snack [unless you are a post-op bariatric surgery patient]          Body mass index  38.0-38.9, adult  See plan outlined in obesity section.     Educational and counseling on strategies for lifestyle change to address weight related health conditions over 1/2 of todays 20 minute appointment.     THOMAS Eldridge  3/15/2021     Thank you very much for allowing us to participate in the care of your patient. Please do not hesitate to call or email if there are any questions.

## 2021-03-15 ENCOUNTER — OFFICE VISIT (OUTPATIENT)
Dept: BARIATRICS/WEIGHT MGMT | Facility: CLINIC | Age: 76
End: 2021-03-15
Payer: MEDICARE

## 2021-03-15 VITALS
WEIGHT: 252.56 LBS | BODY MASS INDEX: 34.21 KG/M2 | DIASTOLIC BLOOD PRESSURE: 80 MMHG | SYSTOLIC BLOOD PRESSURE: 128 MMHG

## 2021-03-15 DIAGNOSIS — E66.812 CLASS 2 SEVERE OBESITY WITH SERIOUS COMORBIDITY AND BODY MASS INDEX (BMI) OF 38.0 TO 38.9 IN ADULT, UNSPECIFIED OBESITY TYPE (CMS/HCC): ICD-10-CM

## 2021-03-15 DIAGNOSIS — E66.01 CLASS 2 SEVERE OBESITY WITH SERIOUS COMORBIDITY AND BODY MASS INDEX (BMI) OF 38.0 TO 38.9 IN ADULT, UNSPECIFIED OBESITY TYPE (CMS/HCC): ICD-10-CM

## 2021-03-15 PROCEDURE — G0447 BEHAVIOR COUNSEL OBESITY 15M: HCPCS | Performed by: NURSE PRACTITIONER

## 2021-03-15 PROCEDURE — 99999 PR OFFICE/OUTPT VISIT,PROCEDURE ONLY: CPT | Performed by: NURSE PRACTITIONER

## 2021-03-15 RX ORDER — MELOXICAM 7.5 MG/1
TABLET ORAL
COMMUNITY
Start: 2021-03-06 | End: 2022-07-06 | Stop reason: SDUPTHER

## 2021-03-15 NOTE — PATIENT INSTRUCTIONS
Problem List Items Addressed This Visit        Endocrine/Metabolic    Class 2 severe obesity with serious comorbidity and body mass index (BMI) of 38.0 to 38.9 in adult (CMS/[facility])     No changes in your plan as it is working for you.    Increase your physical activity as tolerated with your ankle injury.     Increase water from 4 glasses to 5/day. Continue to use Stur for flavoring.     Focus on hydration, primarily from plain water. An appropriate goal for most patients is 64 fluid ounces daily to support metabolic processes. Beware that caffeinated drinks do not count towards your fluid intake since they have a mild diuretic effect. Avoid beverages sweetened with sugar or artificial sweeteners such as sucralose or aspartame.     If you do not like water, consider supplementing with the following dietitian-approved beverage options that are unsweetened or sweetened with stevia, monk fruit, or erythritol: herb or fruit infused water (homemade, Hint, [employer] Wonder Water), flavored or unflavored seltzer water, decaf herbal tea (hot or iced), SweetLeaf or Stur brand water drops, Crystal Light Pure powdered drink mix, True Citrus flavor crystals, Vitamin Water Zero, Roar Organic Electrolyte Infusion, Kari, Body Armor Lyte, or Zevia.     Other strategies to help increase fluid intake:  - Set a timer on your phone, watch, or work calendar to cue you to drink  - Carry a reusable water bottle with you while working or running errands  - Hydrate before, during, and after physical activity  - Drink from a straw [unless you are a post-op bariatric surgery patient]  - Purchase an insulated cup or bottle to keep your beverage at your preferred drinking temperature  - Choose cold liquids when the weather is warm and warm beverages when the weather is cold  - Commit to drinking 8-16 ounces of water with each meal or snack [unless you are a post-op bariatric surgery patient]                  Other    Body mass index 38.0-38.9, adult  - Primary     See plan outlined in obesity section.

## 2021-03-15 NOTE — ASSESSMENT & PLAN NOTE
No changes in your plan as it is working for you.    Increase your physical activity as tolerated with your ankle injury.     Increase water from 4 glasses to 5/day. Continue to use Stur for flavoring.     Focus on hydration, primarily from plain water. An appropriate goal for most patients is 64 fluid ounces daily to support metabolic processes. Beware that caffeinated drinks do not count towards your fluid intake since they have a mild diuretic effect. Avoid beverages sweetened with sugar or artificial sweeteners such as sucralose or aspartame.     If you do not like water, consider supplementing with the following dietitian-approved beverage options that are unsweetened or sweetened with stevia, monk fruit, or erythritol: herb or fruit infused water (homemade, Hint, [employer]mans Wonder Water), flavored or unflavored seltzer water, decaf herbal tea (hot or iced), SweetLeaf or Stur brand water drops, Crystal Light Pure powdered drink mix, True Citrus flavor crystals, Vitamin Water Zero, Roar Organic Electrolyte Infusion, Kari, Body Armor Lyte, or Zevia.     Other strategies to help increase fluid intake:  - Set a timer on your phone, watch, or work calendar to cue you to drink  - Carry a reusable water bottle with you while working or running errands  - Hydrate before, during, and after physical activity  - Drink from a straw [unless you are a post-op bariatric surgery patient]  - Purchase an insulated cup or bottle to keep your beverage at your preferred drinking temperature  - Choose cold liquids when the weather is warm and warm beverages when the weather is cold  - Commit to drinking 8-16 ounces of water with each meal or snack [unless you are a post-op bariatric surgery patient]

## 2021-04-05 RX ORDER — BUPROPION HYDROCHLORIDE 150 MG/1
TABLET ORAL
Qty: 90 TABLET | Refills: 0 | Status: SHIPPED | OUTPATIENT
Start: 2021-04-05 | End: 2021-09-13

## 2021-04-05 NOTE — TELEPHONE ENCOUNTER
Medicine Refill Request    Last Office Visit: 3/15/2021  Last Telemedicine Visit: Visit date not found    Next Office Visit: 4/15/2021  Next Telemedicine Visit: Visit date not found         Current Outpatient Medications:   •  albuterol HFA (VENTOLIN HFA) 90 mcg/actuation inhaler, INHALE 1 PUFF BY MOUTH EVERY 4 HOURS AS NEEDED, Disp: , Rfl:   •  aspirin (ASPIR-81) 81 mg enteric coated tablet, Take 1 tablet (81 mg total) by mouth daily., Disp: 90 tablet, Rfl: 3  •  atorvastatin (LIPITOR) 20 mg tablet, Take 20 mg by mouth once daily., Disp: , Rfl:   •  BREO ELLIPTA 200-25 mcg/dose blister with device powder for inhalation, as needed.  , Disp: , Rfl:   •  buPROPion XL (WELLBUTRIN XL) 150 mg 24 hr tablet, Take 1 tablet (150 mg total) by mouth once daily., Disp: 90 tablet, Rfl: 0  •  coenzyme Q10 (COQ10) 100 mg capsule, Take 100 mg by mouth daily., Disp: , Rfl:   •  meloxicam (MOBIC) 7.5 mg tablet, TAKE 1 TABLET BY MOUTH EVERY DAY DAILY ORALLY 30 DAYS, Disp: , Rfl:   •  metFORMIN (GLUCOPHAGE) 1,000 mg tablet, Take 1 tablet (1,000 mg total) by mouth daily with breakfast., Disp: 90 tablet, Rfl: 0  •  multivit-min/folic/vit K/lycop (ONE-A-DAY MEN'S MULTIVITAMIN ORAL), Take by mouth., Disp: , Rfl:   •  naltrexone (DEPADE) 50 mg tablet, Take 0.5 tablets (25 mg total) by mouth daily., Disp: 90 tablet, Rfl: 0  •  traZODone (DESYREL) 50 mg tablet, Take by mouth as needed.  , Disp: , Rfl:       BP Readings from Last 3 Encounters:   03/15/21 128/80   02/15/21 118/78   02/04/21 140/77       Recent Lab results:  No results found for: CHOL, No results found for: HDL, No results found for: LDLCALC, No results found for: TRIG     No results found for: GLUCOSE, No results found for: HGBA1C      No results found for: CREATININE    No results found for: TSH

## 2021-04-13 ENCOUNTER — TELEPHONE (OUTPATIENT)
Dept: BARIATRICS/WEIGHT MGMT | Facility: CLINIC | Age: 76
End: 2021-04-13

## 2021-04-15 ENCOUNTER — OFFICE VISIT (OUTPATIENT)
Dept: BARIATRICS/WEIGHT MGMT | Facility: CLINIC | Age: 76
End: 2021-04-15
Payer: MEDICARE

## 2021-04-15 VITALS — WEIGHT: 249.5 LBS | SYSTOLIC BLOOD PRESSURE: 118 MMHG | DIASTOLIC BLOOD PRESSURE: 70 MMHG | BODY MASS INDEX: 33.79 KG/M2

## 2021-04-15 DIAGNOSIS — Z13.6 SCREENING FOR CARDIOVASCULAR CONDITION: ICD-10-CM

## 2021-04-15 DIAGNOSIS — E66.01 CLASS 2 SEVERE OBESITY WITH SERIOUS COMORBIDITY AND BODY MASS INDEX (BMI) OF 38.0 TO 38.9 IN ADULT, UNSPECIFIED OBESITY TYPE (CMS/HCC): ICD-10-CM

## 2021-04-15 DIAGNOSIS — E66.812 CLASS 2 SEVERE OBESITY WITH SERIOUS COMORBIDITY AND BODY MASS INDEX (BMI) OF 38.0 TO 38.9 IN ADULT, UNSPECIFIED OBESITY TYPE (CMS/HCC): ICD-10-CM

## 2021-04-15 PROCEDURE — 99999 PR OFFICE/OUTPT VISIT,PROCEDURE ONLY: CPT | Performed by: NURSE PRACTITIONER

## 2021-04-15 PROCEDURE — G0447 BEHAVIOR COUNSEL OBESITY 15M: HCPCS | Performed by: NURSE PRACTITIONER

## 2021-04-15 RX ORDER — NALTREXONE HYDROCHLORIDE 50 MG/1
50 TABLET, FILM COATED ORAL DAILY
Qty: 90 TABLET | Refills: 0 | Status: SHIPPED | OUTPATIENT
Start: 2021-04-15 | End: 2021-07-28 | Stop reason: SDUPTHER

## 2021-04-15 NOTE — ASSESSMENT & PLAN NOTE
Can increase Naltrexone to 50 mg  daily.    Obtain BMP in 6 weeks.     Continue to focus on food type, timing and teamwork. Encouraged adequate protein intake to reduce hunger, low insulin inducing foods to balance insulin  and blood sugar. Encouraged adequate cardiovascular activity and increased nonexercise activity daily. Continue to balance stress with relaxation techniques and aim for adequate sleep nightly.

## 2021-04-15 NOTE — PROGRESS NOTES
DETAILS OF VISIT     Consulting Service:  [facility]    Reason for Consultation:   Chief Complaint   Patient presents with   • Follow-up       PCP: Kamran Wilson MD   HISTORY OF PRESENT ILLNESS        Sudheer Mayorga is a 75 y.o. male actively treated for prediabetes.    PAST MEDICAL AND SURGICAL HISTORY        Past Medical History:   Diagnosis Date   • Allergic rhinitis    • Asthma    • Depression    • Knee tendinitis     right   • Lipid disorder    • HONEY on CPAP    • Prediabetes        Past Surgical History:   Procedure Laterality Date   • HERNIA REPAIR     • ROTATOR CUFF REPAIR         MEDICATIONS        Current Outpatient Medications on File Prior to Visit   Medication Sig Dispense Refill   • albuterol HFA (VENTOLIN HFA) 90 mcg/actuation inhaler INHALE 1 PUFF BY MOUTH EVERY 4 HOURS AS NEEDED     • aspirin (ASPIR-81) 81 mg enteric coated tablet Take 1 tablet (81 mg total) by mouth daily. 90 tablet 3   • atorvastatin (LIPITOR) 20 mg tablet Take 20 mg by mouth once daily.     • BREO ELLIPTA 200-25 mcg/dose blister with device powder for inhalation as needed.       • buPROPion XL (WELLBUTRIN XL) 150 mg 24 hr tablet TAKE 1 TABLET BY MOUTH EVERY DAY 90 tablet 0   • coenzyme Q10 (COQ10) 100 mg capsule Take 100 mg by mouth daily.     • meloxicam (MOBIC) 7.5 mg tablet TAKE 1 TABLET BY MOUTH EVERY DAY DAILY ORALLY 30 DAYS     • metFORMIN (GLUCOPHAGE) 1,000 mg tablet Take 1 tablet (1,000 mg total) by mouth daily with breakfast. 90 tablet 0   • multivit-min/folic/vit K/lycop (ONE-A-DAY MEN'S MULTIVITAMIN ORAL) Take by mouth.     • traZODone (DESYREL) 50 mg tablet Take by mouth as needed.         No current facility-administered medications on file prior to visit.       ALLERGIES        Patient has no known allergies.    SOCIAL HISTORY        Social History     Tobacco Use   • Smoking status: Never Smoker   • Smokeless tobacco: Never Used   Substance Use Topics   • Alcohol use: Yes      Comment: social   • Drug use: Never       REVIEW OF SYSTEMS      Review of Systems    Review of Systems     PHYSICAL EXAMINATION      Visit Vitals  /70   Wt 113 kg (249 lb 8 oz)   BMI 33.79 kg/m²      Body mass index is 33.79 kg/m².      BP Readings from Last 3 Encounters:   04/15/21 118/70   03/15/21 128/80   02/15/21 118/78     Wt Readings from Last 3 Encounters:   04/15/21 113 kg (249 lb 8 oz)   03/15/21 115 kg (252 lb 9 oz)   02/15/21 116 kg (255 lb 1 oz)       Physical Exam  Patient is here today to follow up on using lifestyle modification and weight loss as adjunct to traditional medical therapy/treatment strategy for prediabetes.  Medications reviewed. He continues to take Naltrexone 25 mg in the morning but feels it does not last long enough. He takes Metformin 1000 mg daily and denies any side effect.     Ask: He is feeling great and is happy with his weight loss.     Assess: His weight is down 37 pounds since beginning the program.     Nutrition Review:He has been eating 2 eggs for breakfast and also more matza bread due to the holidays. Lunch has been a salad and green beans. Dinners have been a protein, salad and a starch. He is getting back to 2 nutra system meals and 2 snacks/day with a lean and green meal.     Physical Activity Review: He just began taking golf lessons but had to stop due to ankle problems. He is going to get a new insert from his podiatrist for the right tendon injury.     Emotional Wellness Review: He feels good and is happy.     Sleep Review: He is getting good sleep with CPAP and feels rested in the morning. He does take Trazadone as needed for sleep.     Advise/Agree: He will increase his Naltreone to 50 mg daily and Sudheer has agreed to obtain labs in 6 weeks. We discussed him taking the Naltrexone 1/2 tablet twice daily but he reports he can control his hunger up until lunchtime so he would like to try taking it at 1 pm.     Arrange: Will folllow up in 2 weeks.          LABS / IMAGING / EKG        Reviewed the following Labs:    No results found for: HGBA1C  No results found for: CHOL  No results found for: HDL  No results found for: LDLCALC  No results found for: TRIG  No results found for: CHOLHDL      ASSESSMENT AND PLAN         Body mass index 38.0-38.9, adult  See plan outlined in obesity section.     Class 2 severe obesity with serious comorbidity and body mass index (BMI) of 38.0 to 38.9 in adult (CMS/[facility])  Can increase Naltrexone to 50 mg  daily.    Obtain BMP in 6 weeks.     Continue to focus on food type, timing and teamwork. Encouraged adequate protein intake to reduce hunger, low insulin inducing foods to balance insulin  and blood sugar. Encouraged adequate cardiovascular activity and increased nonexercise activity daily. Continue to balance stress with relaxation techniques and aim for adequate sleep nightly.     Educational and counseling on strategies for lifestyle change to address weight related health conditions over 1/2 of todays 20 minute appointment.       THOMAS Eldridge  4/15/2021     Thank you very much for allowing us to participate in the care of your patient. Please do not hesitate to call or email if there are any questions.

## 2021-04-15 NOTE — PATIENT INSTRUCTIONS
Problem List Items Addressed This Visit        Endocrine/Metabolic    Class 2 severe obesity with serious comorbidity and body mass index (BMI) of 38.0 to 38.9 in adult (CMS/[facility])     Continue to follow 2 Nutrisystem meals and 2 snacks/day with dinner lean and green.     Increase physical activity with walks when the weather improves                 Other    Body mass index 38.0-38.9, adult - Primary     See plan outlined in obesity section.                
What Type Of Note Output Would You Prefer (Optional)?: Standard Output
How Severe Are Your Spot(S)?: mild
Have Your Spot(S) Been Treated In The Past?: has not been treated
Hpi Title: Evaluation of Skin Lesions

## 2021-04-15 NOTE — PATIENT INSTRUCTIONS
Problem List Items Addressed This Visit        Endocrine/Metabolic    Class 2 severe obesity with serious comorbidity and body mass index (BMI) of 38.0 to 38.9 in adult (CMS/[facility])     Can increase Naltrexone to 50 mg  daily.    Obtain BMP in 6 weeks.     Continue to focus on food type, timing and teamwork. Encouraged adequate protein intake to reduce hunger, low insulin inducing foods to balance insulin  and blood sugar. Encouraged adequate cardiovascular activity and increased nonexercise activity daily. Continue to balance stress with relaxation techniques and aim for adequate sleep nightly.               Other    Body mass index 38.0-38.9, adult - Primary     See plan outlined in obesity section.            Other Visit Diagnoses     Screening for cardiovascular condition        Relevant Orders    Basic metabolic panel

## 2021-04-21 LAB
BUN SERPL-MCNC: 16 MG/DL (ref 7–25)
BUN/CREAT SERPL: ABNORMAL (CALC) (ref 6–22)
CALCIUM SERPL-MCNC: 9.1 MG/DL (ref 8.6–10.3)
CHLORIDE SERPL-SCNC: 104 MMOL/L (ref 98–110)
CO2 SERPL-SCNC: 31 MMOL/L (ref 20–32)
CREAT SERPL-MCNC: 0.87 MG/DL (ref 0.7–1.18)
GLUCOSE SERPL-MCNC: 112 MG/DL (ref 65–99)
POTASSIUM SERPL-SCNC: 4.3 MMOL/L (ref 3.5–5.3)
QUEST EGFR AFRICAN AMERICAN: 99 ML/MIN/1.73M2
QUEST EGFR NON-AFR. AMERICAN: 86 ML/MIN/1.73M2
SODIUM SERPL-SCNC: 140 MMOL/L (ref 135–146)

## 2021-04-27 RX ORDER — METFORMIN HYDROCHLORIDE 1000 MG/1
1000 TABLET ORAL
Qty: 90 TABLET | Refills: 0 | Status: SHIPPED | OUTPATIENT
Start: 2021-04-27 | End: 2021-07-28 | Stop reason: SDUPTHER

## 2021-04-27 NOTE — TELEPHONE ENCOUNTER
Mr Mayorga would like a refill of his Metformin called into the [facility] in [place]

## 2021-04-29 ENCOUNTER — TELEMEDICINE (OUTPATIENT)
Dept: BARIATRICS/WEIGHT MGMT | Facility: CLINIC | Age: 76
End: 2021-04-29
Payer: MEDICARE

## 2021-04-29 DIAGNOSIS — E66.01 CLASS 2 SEVERE OBESITY WITH SERIOUS COMORBIDITY AND BODY MASS INDEX (BMI) OF 38.0 TO 38.9 IN ADULT, UNSPECIFIED OBESITY TYPE (CMS/HCC): ICD-10-CM

## 2021-04-29 DIAGNOSIS — E66.812 CLASS 2 SEVERE OBESITY WITH SERIOUS COMORBIDITY AND BODY MASS INDEX (BMI) OF 38.0 TO 38.9 IN ADULT, UNSPECIFIED OBESITY TYPE (CMS/HCC): ICD-10-CM

## 2021-04-29 PROCEDURE — 99999 PR OFFICE/OUTPT VISIT,PROCEDURE ONLY: CPT | Mod: 95 | Performed by: NURSE PRACTITIONER

## 2021-04-29 PROCEDURE — G0447 BEHAVIOR COUNSEL OBESITY 15M: HCPCS | Mod: 95 | Performed by: NURSE PRACTITIONER

## 2021-04-29 NOTE — ASSESSMENT & PLAN NOTE
Continue to follow the plan as it is working for you.    Great job you are down 37 pounds in 6 months!!

## 2021-04-29 NOTE — ASSESSMENT & PLAN NOTE
See plan outlined in obesity section.    Educational and counseling on strategies for lifestyle change to address weight related health conditions over 1/2 of todays 20 minute appointment.

## 2021-04-29 NOTE — PATIENT INSTRUCTIONS
Problem List Items Addressed This Visit        Endocrine/Metabolic    Class 2 severe obesity with serious comorbidity and body mass index (BMI) of 38.0 to 38.9 in adult (CMS/[facility])     Continue to follow the plan as it is working for you.    Great job you are down 37 pounds in 6 months!!            Other    Body mass index 38.0-38.9, adult - Primary     See plan outlined in obesity section.    Educational and counseling on strategies for lifestyle change to address weight related health conditions over 1/2 of todays 20 minute appointment.

## 2021-04-29 NOTE — PROGRESS NOTES
DETAILS OF VISIT     Consulting Service:  [facility] Comprehensive Weight and Wellness Program    Reason for Consultation:   Chief Complaint   Patient presents with   • Follow-up     Request for Consent:   Audio Only Encounter   You and I are about to have a telemedicine check-in or visit. This is allowed because you have requested it. This telemedicine visit will be billed to your health insurance or you, if you are self-insured. You understand you will be responsible for any copayments or coinsurances that apply to your telemedicine visit. Before starting our telemedicine visit, I am required to get your consent for this virtual check-in or visit by telemedicine. Do you consent?    Patient Response to Request for Consent:  Yes    PCP: Kamran Wilson MD   HISTORY OF PRESENT ILLNESS        Sudheer Mayorga is a 76 y.o. male actively treated for prediabetes.    PAST MEDICAL AND SURGICAL HISTORY        Past Medical History:   Diagnosis Date   • Allergic rhinitis    • Asthma    • Depression    • Knee tendinitis     right   • Lipid disorder    • HONEY on CPAP    • Prediabetes        Past Surgical History:   Procedure Laterality Date   • HERNIA REPAIR     • ROTATOR CUFF REPAIR         MEDICATIONS        Current Outpatient Medications on File Prior to Visit   Medication Sig Dispense Refill   • albuterol HFA (VENTOLIN HFA) 90 mcg/actuation inhaler INHALE 1 PUFF BY MOUTH EVERY 4 HOURS AS NEEDED     • aspirin (ASPIR-81) 81 mg enteric coated tablet Take 1 tablet (81 mg total) by mouth daily. 90 tablet 3   • atorvastatin (LIPITOR) 20 mg tablet Take 20 mg by mouth once daily.     • BREO ELLIPTA 200-25 mcg/dose blister with device powder for inhalation as needed.       • buPROPion XL (WELLBUTRIN XL) 150 mg 24 hr tablet TAKE 1 TABLET BY MOUTH EVERY DAY 90 tablet 0   • coenzyme Q10 (COQ10) 100 mg capsule Take 100 mg by mouth daily.     • meloxicam (MOBIC) 7.5 mg tablet TAKE 1 TABLET BY MOUTH EVERY DAY DAILY ORALLY 30 DAYS     • metFORMIN  (GLUCOPHAGE) 1,000 mg tablet Take 1 tablet (1,000 mg total) by mouth daily with breakfast. 90 tablet 0   • multivit-min/folic/vit K/lycop (ONE-A-DAY MEN'S MULTIVITAMIN ORAL) Take by mouth.     • naltrexone (DEPADE) 50 mg tablet Take 1 tablet (50 mg total) by mouth daily. 90 tablet 0   • traZODone (DESYREL) 50 mg tablet Take by mouth as needed.         No current facility-administered medications on file prior to visit.       ALLERGIES        Patient has no known allergies.    SOCIAL HISTORY        Social History     Tobacco Use   • Smoking status: Never Smoker   • Smokeless tobacco: Never Used   Substance Use Topics   • Alcohol use: Yes     Comment: social   • Drug use: Never       REVIEW OF SYSTEMS      Review of Systems    Review of Systems     PHYSICAL EXAMINATION      There were no vitals taken for this visit.   There is no height or weight on file to calculate BMI.      BP Readings from Last 3 Encounters:   04/15/21 118/70   03/15/21 128/80   02/15/21 118/78     Wt Readings from Last 3 Encounters:   04/15/21 113 kg (249 lb 8 oz)   03/15/21 115 kg (252 lb 9 oz)   02/15/21 116 kg (255 lb 1 oz)       Physical Exam  Patient is here today to follow up on using lifestyle modification and weight loss as adjunct to traditional medical therapy/treatment strategy for prediabetes.  Medications reviewed. No medication changes. He is taking Naltrexone 50 mg and it is working for him. He denies any side effects.     Ask: Burt feels the program is continuing to work for him. He feels the medicare obesity counseling has helped him stay on track. He has eaten off plan more this week due to celebrating his birthday.     Assess: His weight is down 37 pounds since beginning the program 6 months ago.     Nutrition Review: He is using Nutrasystem for 2 meals daily and one lean and green meal.     Physical Activity Review: He is still dealing with ankle pain and is going to start back at the gym in June. He is keeping active around  the house.     Emotional Wellness Review: No complaints.     Sleep Review: He has been taking a Trazodone and is getting about 6 hours/night. He does not want to take a sleeping pill every night to sleep so we discussed decreasing to half of a Trazodone and see if he gets good sleep.      Advise/Agree: He has agreed to continue to follow the plan.     Arrange: Will follow up in one month.       LABS / IMAGING / EKG        Reviewed the following Labs:    No results found for: HGBA1C  No results found for: CHOL  No results found for: HDL  No results found for: LDLCALC  No results found for: TRIG  No results found for: CHOLHDL      ASSESSMENT AND PLAN         Body mass index 38.0-38.9, adult  See plan outlined in obesity section.    Educational and counseling on strategies for lifestyle change to address weight related health conditions over 1/2 of todays 20 minute appointment.       Class 2 severe obesity with serious comorbidity and body mass index (BMI) of 38.0 to 38.9 in adult (CMS/[facility])  Continue to follow the plan as it is working for you.    Great job you are down 37 pounds in 6 months!!      THOMAS Eldridge  4/29/2021     Thank you very much for allowing us to participate in the care of your patient. Please do not hesitate to call or email if there are any questions.

## 2021-05-06 ENCOUNTER — OFFICE VISIT (OUTPATIENT)
Dept: BARIATRICS/WEIGHT MGMT | Facility: CLINIC | Age: 76
End: 2021-05-06
Payer: MEDICARE

## 2021-05-06 VITALS — BODY MASS INDEX: 33.83 KG/M2 | WEIGHT: 249.8 LBS

## 2021-05-06 DIAGNOSIS — E66.01 CLASS 2 SEVERE OBESITY WITH SERIOUS COMORBIDITY AND BODY MASS INDEX (BMI) OF 38.0 TO 38.9 IN ADULT, UNSPECIFIED OBESITY TYPE (CMS/HCC): ICD-10-CM

## 2021-05-06 DIAGNOSIS — R73.03 PREDIABETES: Primary | ICD-10-CM

## 2021-05-06 DIAGNOSIS — G47.33 SLEEP APNEA, OBSTRUCTIVE: ICD-10-CM

## 2021-05-06 DIAGNOSIS — E66.812 CLASS 2 SEVERE OBESITY WITH SERIOUS COMORBIDITY AND BODY MASS INDEX (BMI) OF 38.0 TO 38.9 IN ADULT, UNSPECIFIED OBESITY TYPE (CMS/HCC): ICD-10-CM

## 2021-05-06 PROCEDURE — 99213 OFFICE O/P EST LOW 20 MIN: CPT | Performed by: FAMILY MEDICINE

## 2021-05-06 NOTE — PATIENT INSTRUCTIONS
Problem List Items Addressed This Visit        Respiratory    Sleep apnea, obstructive     Continue treatment with lifestyle modifications.  See plan as outlined in Obesity treatment section.     There is a strong connection between proper sleep and the ability to regulate weight.  There are multiple techniques for improving sleep.  The #1 sleep aid available is routine dedicated physical activity.  Make sure to avoid caffeinated beverages after lunch time.  Manipulating light and melatonin and keeping a consistent wake time can be helpful.  Consider turning off overhead lights and using  table and floor lamps for lighting within 2 hours of scheduled bedtime.  Turn off bluelight electronics within 1 hour of scheduled bedtime.  Consider relaxation technique such as journaling, meditation, binaural beats, or gentle stretching prior to sleep.  Try to wake up around the same time every day regardless of the previous night sleep.  If needed, over-the-counter supplements such as low-dose melatonin 0.5 -3 mg or valerian root may be helpful.  Prescription medications could also be considered if needed.               Endocrine/Metabolic    Prediabetes - Primary     Continue on metformin.     Continue to focus on food type timing and teamwork.  Encouraged adequate protein intake to reduce hunger, low insulin inducing foods to balance insulin and blood sugar.  Encouraged adequate cardiovascular activity and increased nonexercise activity daily.  Continue to balance stress with relaxation techniques and aim for adequate sleep nightly.    Continue treatment with lifestyle modifications.  See plan as outlined in Obesity treatment section.            Class 2 severe obesity with serious comorbidity and body mass index (BMI) of 38.0 to 38.9 in adult (CMS/[facility])     Excellent progress!    Not currently struggling with hunger or cravings, but noticing that he is Hangry by dinner time -> decided to add a 150 calorie snack -- like fruit  "and 1/4 cup nuts to the afternoon.      Increased from 4 glasses to 6 glasses ->pair 8 oz of water with every eating episode.    To prepare for th trip -- handout on \"out to eat\" and also will increase swimming to offset other calories consumed.  Will take nutrisystme breakfast and snacks to the trip.      I spent 20 minutes on this date of service performing the following activities: obtaining history, documenting and providing counseling and education.                 "

## 2021-05-06 NOTE — ASSESSMENT & PLAN NOTE
Continue treatment with lifestyle modifications.  See plan as outlined in Obesity treatment section.     There is a strong connection between proper sleep and the ability to regulate weight.  There are multiple techniques for improving sleep.  The #1 sleep aid available is routine dedicated physical activity.  Make sure to avoid caffeinated beverages after lunch time.  Manipulating light and melatonin and keeping a consistent wake time can be helpful.  Consider turning off overhead lights and using  table and floor lamps for lighting within 2 hours of scheduled bedtime.  Turn off bluelight electronics within 1 hour of scheduled bedtime.  Consider relaxation technique such as journaling, meditation, binaural beats, or gentle stretching prior to sleep.  Try to wake up around the same time every day regardless of the previous night sleep.  If needed, over-the-counter supplements such as low-dose melatonin 0.5 -3 mg or valerian root may be helpful.  Prescription medications could also be considered if needed.

## 2021-05-06 NOTE — ASSESSMENT & PLAN NOTE
"Excellent progress!    Not currently struggling with hunger or cravings, but noticing that he is Hangry by dinner time -> decided to add a 150 calorie snack -- like fruit and 1/4 cup nuts to the afternoon.      Increased from 4 glasses to 6 glasses ->pair 8 oz of water with every eating episode.    To prepare for th trip -- handout on \"out to eat\" and also will increase swimming to offset other calories consumed.  Will take nutrisystme breakfast and snacks to the trip.      I spent 20 minutes on this date of service performing the following activities: obtaining history, documenting and providing counseling and education.      "

## 2021-05-06 NOTE — ASSESSMENT & PLAN NOTE
Continue on metformin.     Continue to focus on food type timing and teamwork.  Encouraged adequate protein intake to reduce hunger, low insulin inducing foods to balance insulin and blood sugar.  Encouraged adequate cardiovascular activity and increased nonexercise activity daily.  Continue to balance stress with relaxation techniques and aim for adequate sleep nightly.    Continue treatment with lifestyle modifications.  See plan as outlined in Obesity treatment section.

## 2021-05-06 NOTE — PROGRESS NOTES
Subjective     Patient ID: Sudheer Mayorga is a 76 y.o. male.    HPI  Sudheer Mayorga is a 76 y.o. male following up on lifestyle management and weight loss as adjunctive treatment strategies for Sleep apnea, hyperlipidemia, and prediabetes    Medications used to support lifestyle modifications: Wellbutrin, naltrexone, Metformin  Side effects: None    Interval history: Lost approximately 40 pounds.  Starting to struggle with some hunger prior to dinnertime.  Continuing to use Nutrisystem as a structure, noticing he is having approximately 1500 geoffrey a day.    Concerns or questions: Going on a business trip, wondering how to manage food while away.    Nutrition goals:  Struggling with hunger: Some, early evening  Struggling with non-hunger eating: No  Meeting protein requirements: Yes  Getting adequate water intake: No, 4 glasses/day      Consistently meeting hourly movement goals: Yes  Current exercise regimen: Limited due to ankle injury, starting to feel better as injury was 3 months ago.    Insights on emotional wellness, awareness, and sleep: Sleeping only 5 hours, trouble falling asleep.  Would like to get more rest, but does wake feeling rested.        Current Outpatient Medications on File Prior to Visit   Medication Sig Dispense Refill   • albuterol HFA (VENTOLIN HFA) 90 mcg/actuation inhaler INHALE 1 PUFF BY MOUTH EVERY 4 HOURS AS NEEDED     • aspirin (ASPIR-81) 81 mg enteric coated tablet Take 1 tablet (81 mg total) by mouth daily. 90 tablet 3   • atorvastatin (LIPITOR) 20 mg tablet Take 20 mg by mouth once daily.     • BREO ELLIPTA 200-25 mcg/dose blister with device powder for inhalation as needed.       • buPROPion XL (WELLBUTRIN XL) 150 mg 24 hr tablet TAKE 1 TABLET BY MOUTH EVERY DAY 90 tablet 0   • coenzyme Q10 (COQ10) 100 mg capsule Take 100 mg by mouth daily.     • meloxicam (MOBIC) 7.5 mg tablet TAKE 1 TABLET BY MOUTH EVERY DAY DAILY ORALLY 30 DAYS     • metFORMIN (GLUCOPHAGE) 1,000 mg tablet Take 1  tablet (1,000 mg total) by mouth daily with breakfast. 90 tablet 0   • multivit-min/folic/vit K/lycop (ONE-A-DAY MEN'S MULTIVITAMIN ORAL) Take by mouth.     • naltrexone (DEPADE) 50 mg tablet Take 1 tablet (50 mg total) by mouth daily. 90 tablet 0   • traZODone (DESYREL) 50 mg tablet Take by mouth as needed.         No current facility-administered medications on file prior to visit.            Patient has no known allergies.             Review of Systems    Objective     Vitals:    05/06/21 0751   Weight: 113 kg (249 lb 12.8 oz)     Body mass index is 33.83 kg/m².    Physical Exam    Assessment/Plan   Diagnoses and all orders for this visit:    Prediabetes (Primary)  Assessment & Plan:  Continue on metformin.     Continue to focus on food type timing and teamwork.  Encouraged adequate protein intake to reduce hunger, low insulin inducing foods to balance insulin and blood sugar.  Encouraged adequate cardiovascular activity and increased nonexercise activity daily.  Continue to balance stress with relaxation techniques and aim for adequate sleep nightly.    Continue treatment with lifestyle modifications.  See plan as outlined in Obesity treatment section.         Sleep apnea, obstructive  Assessment & Plan:  Continue treatment with lifestyle modifications.  See plan as outlined in Obesity treatment section.     There is a strong connection between proper sleep and the ability to regulate weight.  There are multiple techniques for improving sleep.  The #1 sleep aid available is routine dedicated physical activity.  Make sure to avoid caffeinated beverages after lunch time.  Manipulating light and melatonin and keeping a consistent wake time can be helpful.  Consider turning off overhead lights and using  table and floor lamps for lighting within 2 hours of scheduled bedtime.  Turn off bluelight electronics within 1 hour of scheduled bedtime.  Consider relaxation technique such as journaling, meditation, binaural  "beats, or gentle stretching prior to sleep.  Try to wake up around the same time every day regardless of the previous night sleep.  If needed, over-the-counter supplements such as low-dose melatonin 0.5 -3 mg or valerian root may be helpful.  Prescription medications could also be considered if needed.         Class 2 severe obesity with serious comorbidity and body mass index (BMI) of 38.0 to 38.9 in adult, unspecified obesity type (CMS/[facility])  Assessment & Plan:  Excellent progress!    Not currently struggling with hunger or cravings, but noticing that he is Hangry by dinner time -> decided to add a 150 calorie snack -- like fruit and 1/4 cup nuts to the afternoon.      Increased from 4 glasses to 6 glasses ->pair 8 oz of water with every eating episode.    To prepare for th trip -- handout on \"out to eat\" and also will increase swimming to offset other calories consumed.  Will take nutrisystme breakfast and snacks to the trip.      I spent 20 minutes on this date of service performing the following activities: obtaining history, documenting and providing counseling and education.              "

## 2021-05-26 ENCOUNTER — OFFICE VISIT (OUTPATIENT)
Dept: BARIATRICS/WEIGHT MGMT | Facility: CLINIC | Age: 76
End: 2021-05-26
Payer: MEDICARE

## 2021-05-26 VITALS — BODY MASS INDEX: 33.49 KG/M2 | WEIGHT: 247.3 LBS

## 2021-05-26 DIAGNOSIS — E66.01 CLASS 2 SEVERE OBESITY WITH SERIOUS COMORBIDITY AND BODY MASS INDEX (BMI) OF 38.0 TO 38.9 IN ADULT, UNSPECIFIED OBESITY TYPE (CMS/HCC): ICD-10-CM

## 2021-05-26 DIAGNOSIS — E66.812 CLASS 2 SEVERE OBESITY WITH SERIOUS COMORBIDITY AND BODY MASS INDEX (BMI) OF 38.0 TO 38.9 IN ADULT, UNSPECIFIED OBESITY TYPE (CMS/HCC): ICD-10-CM

## 2021-05-26 PROCEDURE — 99999 PR OFFICE/OUTPT VISIT,PROCEDURE ONLY: CPT | Performed by: NURSE PRACTITIONER

## 2021-05-26 PROCEDURE — G0447 BEHAVIOR COUNSEL OBESITY 15M: HCPCS | Performed by: NURSE PRACTITIONER

## 2021-05-26 NOTE — ASSESSMENT & PLAN NOTE
GREAT JOB YOU ARE DOWN 40 POUNDS!!!    Begin to transition to more grocery meals as you have been making wonderful food choices.    Continue to focus on food type, timing and teamwork. Encouraged adequate protein intake to reduce hunger, low insulin inducing foods to balance insulin  and blood sugar. Encouraged adequate cardiovascular activity and increased nonexercise activity daily. Continue to balance stress with relaxation techniques and aim for adequate sleep nightly.     Get back to the gym now that your ankle injury has improved.

## 2021-05-26 NOTE — PATIENT INSTRUCTIONS
Problem List Items Addressed This Visit        Endocrine/Metabolic    Class 2 severe obesity with serious comorbidity and body mass index (BMI) of 38.0 to 38.9 in adult (CMS/[facility])     GREAT JOB YOU ARE DOWN 40 POUNDS!!!    Begin to transition to more grocery meals as you have been making wonderful food choices.    Continue to focus on food type, timing and teamwork. Encouraged adequate protein intake to reduce hunger, low insulin inducing foods to balance insulin  and blood sugar. Encouraged adequate cardiovascular activity and increased nonexercise activity daily. Continue to balance stress with relaxation techniques and aim for adequate sleep nightly.     Get back to the gym now that your ankle injury has improved.                 Other    Body mass index 38.0-38.9, adult - Primary     See plan outlined in obesity section    Educational and counseling on strategies for lifestyle change to address weight related health conditions over 1/2 of todays 20 minute appointment.

## 2021-05-26 NOTE — PROGRESS NOTES
DETAILS OF VISIT     Consulting Service:  [facility] Comprehensive Weight and Wellness Program    Reason for Consultation:   Chief Complaint   Patient presents with   • Follow-up       PCP: Kamran Wilson MD   HISTORY OF PRESENT ILLNESS        Sudheer Mayorga is a 76 y.o. male actively treated for prediabetes and sleep apnea.    PAST MEDICAL AND SURGICAL HISTORY        Past Medical History:   Diagnosis Date   • Allergic rhinitis    • Asthma    • Depression    • Knee tendinitis     right   • Lipid disorder    • HONEY on CPAP    • Prediabetes        Past Surgical History:   Procedure Laterality Date   • HERNIA REPAIR     • ROTATOR CUFF REPAIR         MEDICATIONS        Current Outpatient Medications on File Prior to Visit   Medication Sig Dispense Refill   • albuterol HFA (VENTOLIN HFA) 90 mcg/actuation inhaler INHALE 1 PUFF BY MOUTH EVERY 4 HOURS AS NEEDED     • aspirin (ASPIR-81) 81 mg enteric coated tablet Take 1 tablet (81 mg total) by mouth daily. 90 tablet 3   • atorvastatin (LIPITOR) 20 mg tablet Take 20 mg by mouth once daily.     • BREO ELLIPTA 200-25 mcg/dose blister with device powder for inhalation as needed.       • buPROPion XL (WELLBUTRIN XL) 150 mg 24 hr tablet TAKE 1 TABLET BY MOUTH EVERY DAY 90 tablet 0   • coenzyme Q10 (COQ10) 100 mg capsule Take 100 mg by mouth daily.     • meloxicam (MOBIC) 7.5 mg tablet TAKE 1 TABLET BY MOUTH EVERY DAY DAILY ORALLY 30 DAYS     • metFORMIN (GLUCOPHAGE) 1,000 mg tablet Take 1 tablet (1,000 mg total) by mouth daily with breakfast. 90 tablet 0   • multivit-min/folic/vit K/lycop (ONE-A-DAY MEN'S MULTIVITAMIN ORAL) Take by mouth.     • naltrexone (DEPADE) 50 mg tablet Take 1 tablet (50 mg total) by mouth daily. 90 tablet 0   • traZODone (DESYREL) 50 mg tablet Take by mouth as needed.         No current facility-administered medications on file prior to visit.       ALLERGIES        Patient has no known allergies.    SOCIAL HISTORY        Social History     Tobacco Use   •  Smoking status: Never Smoker   • Smokeless tobacco: Never Used   Substance Use Topics   • Alcohol use: Yes     Comment: social   • Drug use: Never       REVIEW OF SYSTEMS      Review of Systems    Review of Systems     PHYSICAL EXAMINATION      Visit Vitals  Wt 112 kg (247 lb 4.8 oz)   BMI 33.49 kg/m²      Body mass index is 33.49 kg/m².      BP Readings from Last 3 Encounters:   04/15/21 118/70   03/15/21 128/80   02/15/21 118/78     Wt Readings from Last 3 Encounters:   05/26/21 112 kg (247 lb 4.8 oz)   05/06/21 113 kg (249 lb 12.8 oz)   04/15/21 113 kg (249 lb 8 oz)       Physical Exam    Patient is here today to follow up on using lifestyle modification and weight loss as adjunct to traditional medical therapy/treatment strategy for prediabetes and sleep apnea.  Medications reviewed.     Ask: He had his shingles vaccine yesterday and feels fine. He just got back from a vacation in [place] for 8 days and was very active while he was there.     Assess: His weight is 247 today and he is down 40 pounds since beginning the program.     Nutrition Review:He was away for 8 days and ate out three meals/day. He made great choices and did not gain any weight on vacaton. He is drinking 5-6 glasses/day of water.     Physical Activity Review: His ankle is feeling better and he is ready to get back to exercie. He is thinking of going back to the gym in June.     Emotional Wellness Review: He feels good and is going to the beach today.     Sleep Review: He has only been getting 4-5 hours of sleep/night but last night he took Trazondone 50 mg and slept 7 hours and feels great today.     Advise/Agree:Sudheer has agreed to begin to incorporate more grocery meals into his routine. He will also increase his physical activity as tolerated with his ankle.     Arrange:He will continue to follow up with me monthly for medicare obesity counseling.      LABS / IMAGING / EKG        Reviewed the following Labs:    No results found for:  HGBA1C  No results found for: CHOL  No results found for: HDL  No results found for: LDLCALC  No results found for: TRIG  No results found for: CHOLHDL      ASSESSMENT AND PLAN         Body mass index 38.0-38.9, adult  See plan outlined in obesity section    Educational and counseling on strategies for lifestyle change to address weight related health conditions over 1/2 of todays 20 minute appointment.       Class 2 severe obesity with serious comorbidity and body mass index (BMI) of 38.0 to 38.9 in adult (CMS/[facility])  GREAT JOB YOU ARE DOWN 40 POUNDS!!!    Begin to transition to more grocery meals as you have been making wonderful food choices.    Continue to focus on food type, timing and teamwork. Encouraged adequate protein intake to reduce hunger, low insulin inducing foods to balance insulin  and blood sugar. Encouraged adequate cardiovascular activity and increased nonexercise activity daily. Continue to balance stress with relaxation techniques and aim for adequate sleep nightly.     Get back to the gym now that your ankle injury has improved.           THOMAS Eldridge  5/26/2021     Thank you very much for allowing us to participate in the care of your patient. Please do not hesitate to call or email if there are any questions.

## 2021-05-26 NOTE — ASSESSMENT & PLAN NOTE
See plan outlined in obesity section    Educational and counseling on strategies for lifestyle change to address weight related health conditions over 1/2 of todays 20 minute appointment.

## 2021-06-28 ENCOUNTER — TELEMEDICINE (OUTPATIENT)
Dept: BARIATRICS/WEIGHT MGMT | Facility: CLINIC | Age: 76
End: 2021-06-28
Payer: MEDICARE

## 2021-06-28 DIAGNOSIS — E66.811 CLASS 1 OBESITY WITH SERIOUS COMORBIDITY AND BODY MASS INDEX (BMI) OF 33.0 TO 33.9 IN ADULT, UNSPECIFIED OBESITY TYPE: Primary | ICD-10-CM

## 2021-06-28 PROCEDURE — 99999 PR OFFICE/OUTPT VISIT,PROCEDURE ONLY: CPT | Mod: 95 | Performed by: NURSE PRACTITIONER

## 2021-06-28 PROCEDURE — G0447 BEHAVIOR COUNSEL OBESITY 15M: HCPCS | Mod: 95 | Performed by: NURSE PRACTITIONER

## 2021-06-28 NOTE — ASSESSMENT & PLAN NOTE
Continue to introduce more grocery meals.     Increase your physical activity with  at the gym twice a week.     When you go out to dinner, get your to go box before you eat your meal so you can split it in half right away.     Continue to focus on food type, timing and teamwork. Encouraged adequate protein intake to reduce hunger, low insulin inducing foods to balance insulin  and blood sugar. Encouraged adequate cardiovascular activity and increased nonexercise activity daily. Continue to balance stress with relaxation techniques and aim for adequate sleep nightly.     Continue with Naltrexone 25 mg twice daily.

## 2021-06-28 NOTE — PROGRESS NOTES
Verification of Patient Location:  The patient affirms they are currently located in the following state: [place]    Request for Consent:    Audio Only Encounter   You and I are about to have a telemedicine check-in or visit. This is allowed because you have requested it. This telemedicine visit will be billed to your health insurance or you, if you are self-insured. You understand you will be responsible for any copayments or coinsurances that apply to your telemedicine visit. Before starting our telemedicine visit, I am required to get your consent for this virtual check-in or visit by telemedicine. Do you consent?    Patient Response to Request for Consent:  Yes      Visit Documentation:  Subjective     Patient ID: Sudheer Mayorga is a 76 y.o. male.  1945      HPI    The following have been reviewed and updated as appropriate in this visit:  Allergies  Meds  Problems       Review of Systems    Patient is here today to follow up on using lifestyle modification and weight loss as adjunct to traditional medical therapy/treatment strategy for prediabetes.  Medications reviewed. He continues to take Wellbutrin, Metformin and Naltrexone as ordered.     Ask: He is taking over the counter medications at this time for a cold.     Assess: His weight is up to 250 pounds due to eating out more often.     Nutrition Review: He has been eating out more often due [place] [facility] He is getting plenty of water. He has been eating more grocery meals and decreasing his nutra system meals.     Physical Activity Review: His exercise has decreased due to busy at work. He plans to start with a new  at the [employer] in July and will work with them twice a week.     Emotional Wellness Review: He is feeling good emotionally.     Sleep Review:Sleep is better. He is getting about 6 hours of sleep/night and he feels rested.     Advise/Agree: He has agreed to increase his physical activity to at least twice a week.      Arrange: Will follow up in one month.     Current Outpatient Medications on File Prior to Visit   Medication Sig Dispense Refill   • albuterol HFA (VENTOLIN HFA) 90 mcg/actuation inhaler INHALE 1 PUFF BY MOUTH EVERY 4 HOURS AS NEEDED     • aspirin (ASPIR-81) 81 mg enteric coated tablet Take 1 tablet (81 mg total) by mouth daily. 90 tablet 3   • atorvastatin (LIPITOR) 20 mg tablet Take 20 mg by mouth once daily.     • BREO ELLIPTA 200-25 mcg/dose blister with device powder for inhalation as needed.       • buPROPion XL (WELLBUTRIN XL) 150 mg 24 hr tablet TAKE 1 TABLET BY MOUTH EVERY DAY 90 tablet 0   • coenzyme Q10 (COQ10) 100 mg capsule Take 100 mg by mouth daily.     • meloxicam (MOBIC) 7.5 mg tablet TAKE 1 TABLET BY MOUTH EVERY DAY DAILY ORALLY 30 DAYS     • metFORMIN (GLUCOPHAGE) 1,000 mg tablet Take 1 tablet (1,000 mg total) by mouth daily with breakfast. 90 tablet 0   • multivit-min/folic/vit K/lycop (ONE-A-DAY MEN'S MULTIVITAMIN ORAL) Take by mouth.     • naltrexone (DEPADE) 50 mg tablet Take 1 tablet (50 mg total) by mouth daily. 90 tablet 0   • traZODone (DESYREL) 50 mg tablet Take by mouth as needed.         No current facility-administered medications on file prior to visit.       Patient has no known allergies.  Assessment/Plan   Diagnoses and all orders for this visit:    Class 1 obesity with serious comorbidity and body mass index (BMI) of 33.0 to 33.9 in adult, unspecified obesity type (Primary)  Assessment & Plan:  Continue to introduce more grocery meals.     Increase your physical activity with  at the gym twice a week.     When you go out to dinner, get your to go box before you eat your meal so you can split it in half right away.     Continue to focus on food type, timing and teamwork. Encouraged adequate protein intake to reduce hunger, low insulin inducing foods to balance insulin  and blood sugar. Encouraged adequate cardiovascular activity and increased nonexercise activity daily.  Continue to balance stress with relaxation techniques and aim for adequate sleep nightly.     Continue with Naltrexone 25 mg twice daily.           BMI 33.0-33.9,adult  Assessment & Plan:  See plan outlined in obesity section.     Educational and counseling on strategies for lifestyle change to address weight related health conditions over 1/2 of todays 20 minute appointment.           Time Spent:  I spent 22 minutes on this date of service performing the following activities: obtaining history, documenting, preparing for visit, obtaining / reviewing records, providing counseling and education, independently reviewing study/studies and coordinating care.

## 2021-06-28 NOTE — PATIENT INSTRUCTIONS
Problem List Items Addressed This Visit        Endocrine/Metabolic    Class 1 obesity with serious comorbidity and body mass index (BMI) of 33.0 to 33.9 in adult - Primary     Continue to introduce more grocery meals.     Increase your physical activity with  at the gym twice a week.     When you go out to dinner, get your to go box before you eat your meal so you can split it in half right away.     Continue to focus on food type, timing and teamwork. Encouraged adequate protein intake to reduce hunger, low insulin inducing foods to balance insulin  and blood sugar. Encouraged adequate cardiovascular activity and increased nonexercise activity daily. Continue to balance stress with relaxation techniques and aim for adequate sleep nightly.     Continue with Naltrexone 25 mg twice daily.                 Other    BMI 33.0-33.9,adult     See plan outlined in obesity section.     Educational and counseling on strategies for lifestyle change to address weight related health conditions over 1/2 of todays 20 minute appointment.

## 2021-07-28 RX ORDER — METFORMIN HYDROCHLORIDE 1000 MG/1
1000 TABLET ORAL
Qty: 90 TABLET | Refills: 0 | Status: SHIPPED | OUTPATIENT
Start: 2021-07-28 | End: 2021-10-22

## 2021-07-28 RX ORDER — NALTREXONE HYDROCHLORIDE 50 MG/1
50 TABLET, FILM COATED ORAL DAILY
Qty: 90 TABLET | Refills: 0 | Status: SHIPPED | OUTPATIENT
Start: 2021-07-28 | End: 2022-09-28 | Stop reason: ALTCHOICE

## 2021-07-28 NOTE — TELEPHONE ENCOUNTER
Medicine Refill Request    Last Office Visit: 5/26/2021  Last Telemedicine Visit: 6/28/2021 Shayy Taveras CRNP    Next Office Visit: 8/3/2021  Next Telemedicine Visit: Visit date not found         Current Outpatient Medications:   •  albuterol HFA (VENTOLIN HFA) 90 mcg/actuation inhaler, INHALE 1 PUFF BY MOUTH EVERY 4 HOURS AS NEEDED, Disp: , Rfl:   •  aspirin (ASPIR-81) 81 mg enteric coated tablet, Take 1 tablet (81 mg total) by mouth daily., Disp: 90 tablet, Rfl: 3  •  atorvastatin (LIPITOR) 20 mg tablet, Take 20 mg by mouth once daily., Disp: , Rfl:   •  BREO ELLIPTA 200-25 mcg/dose blister with device powder for inhalation, as needed.  , Disp: , Rfl:   •  buPROPion XL (WELLBUTRIN XL) 150 mg 24 hr tablet, TAKE 1 TABLET BY MOUTH EVERY DAY, Disp: 90 tablet, Rfl: 0  •  coenzyme Q10 (COQ10) 100 mg capsule, Take 100 mg by mouth daily., Disp: , Rfl:   •  meloxicam (MOBIC) 7.5 mg tablet, TAKE 1 TABLET BY MOUTH EVERY DAY DAILY ORALLY 30 DAYS, Disp: , Rfl:   •  metFORMIN (GLUCOPHAGE) 1,000 mg tablet, Take 1 tablet (1,000 mg total) by mouth daily with breakfast., Disp: 90 tablet, Rfl: 0  •  multivit-min/folic/vit K/lycop (ONE-A-DAY MEN'S MULTIVITAMIN ORAL), Take by mouth., Disp: , Rfl:   •  naltrexone (DEPADE) 50 mg tablet, Take 1 tablet (50 mg total) by mouth daily., Disp: 90 tablet, Rfl: 0  •  traZODone (DESYREL) 50 mg tablet, Take by mouth as needed.  , Disp: , Rfl:       BP Readings from Last 3 Encounters:   04/15/21 118/70   03/15/21 128/80   02/15/21 118/78       Recent Lab results:  No results found for: CHOL, No results found for: HDL, No results found for: LDLCALC, No results found for: TRIG     Lab Results   Component Value Date    GLUCOSE 112 (H) 04/20/2021   , No results found for: HGBA1C      Lab Results   Component Value Date    CREATININE 0.87 04/20/2021       No results found for: TSH

## 2021-08-03 ENCOUNTER — TELEMEDICINE (OUTPATIENT)
Dept: BARIATRICS/WEIGHT MGMT | Facility: CLINIC | Age: 76
End: 2021-08-03
Payer: MEDICARE

## 2021-08-03 DIAGNOSIS — E66.811 CLASS 1 OBESITY WITH SERIOUS COMORBIDITY AND BODY MASS INDEX (BMI) OF 33.0 TO 33.9 IN ADULT, UNSPECIFIED OBESITY TYPE: Primary | ICD-10-CM

## 2021-08-03 DIAGNOSIS — R73.03 PREDIABETES: ICD-10-CM

## 2021-08-03 PROCEDURE — 99442 PR PHYS/QHP TELEPHONE EVALUATION 11-20 MIN: CPT | Mod: 95 | Performed by: NURSE PRACTITIONER

## 2021-08-03 NOTE — PATIENT INSTRUCTIONS
Problem List Items Addressed This Visit     Class 1 obesity with serious comorbidity and body mass index (BMI) of 33.0 to 33.9 in adult - Primary     Continue to take Naltrexone 50 mg daily.     Our next appointment is 9/28/21 at 9:00 telehealth.     Continue to focus on food type, timing and teamwork. Encouraged adequate protein intake to reduce hunger, low insulin inducing foods to balance insulin  and blood sugar. Encouraged adequate cardiovascular activity and increased nonexercise activity daily. Continue to balance stress with relaxation techniques and aim for adequate sleep nightly.            Prediabetes     Continue with weight loss strategies. See plan outlined in obesity section.

## 2021-08-03 NOTE — PROGRESS NOTES
Verification of Patient Location:  The patient affirms they are currently located in the following state: [place]    Request for Consent:    Audio Only Encounter   You and I are about to have a telemedicine check-in or visit. This is allowed because you have requested it. This telemedicine visit will be billed to your health insurance or you, if you are self-insured. You understand you will be responsible for any copayments or coinsurances that apply to your telemedicine visit. Before starting our telemedicine visit, I am required to get your consent for this virtual check-in or visit by telemedicine. Do you consent?    Patient Response to Request for Consent:  Yes      Visit Documentation:  Subjective     Patient ID: Sudheer Mayorga is a 76 y.o. male.  1945      HPI    The following have been reviewed and updated as appropriate in this visit:  Allergies  Meds  Problems       Review of Systems  Patient is here today to follow up on using lifestyle modification and weight loss as adjunct to traditional medical therapy/treatment strategy for prediabetes.  Medications reviewed. No medication changes. He continues to take Naltrexone 50 mg daily and feels it is helping curb his appetite.     Ask: He feels good and has been spending a lot of time at the beach.    Assess:  His weight continues to be 250 and he is happy to be able to maintain his weight during the summer months.     Nutrition Review: He continues to eat two nurtasystem meals/day and one lean and green meal/night. He is drinking plenty of water.     Physical Activity Review: He joined [employer] and has been exercising much more.    Emotional Wellness Review: He has been feeling well and has been spending lots of time at the beach.     Sleep Review: He is getting 5-6 hours of sleep/night. He is wearing night glasses to help  decrease blue light and this is helpful.     Advise/Agree: Sudheer has agreed to continue to follow the plan.      Arrange:He will follow up with Dr. Garcia in one month and me in two months.     Current Outpatient Medications on File Prior to Visit   Medication Sig Dispense Refill   • albuterol HFA (VENTOLIN HFA) 90 mcg/actuation inhaler INHALE 1 PUFF BY MOUTH EVERY 4 HOURS AS NEEDED     • aspirin (ASPIR-81) 81 mg enteric coated tablet Take 1 tablet (81 mg total) by mouth daily. 90 tablet 3   • atorvastatin (LIPITOR) 20 mg tablet Take 20 mg by mouth once daily.     • BREO ELLIPTA 200-25 mcg/dose blister with device powder for inhalation as needed.       • buPROPion XL (WELLBUTRIN XL) 150 mg 24 hr tablet TAKE 1 TABLET BY MOUTH EVERY DAY 90 tablet 0   • coenzyme Q10 (COQ10) 100 mg capsule Take 100 mg by mouth daily.     • meloxicam (MOBIC) 7.5 mg tablet TAKE 1 TABLET BY MOUTH EVERY DAY DAILY ORALLY 30 DAYS     • metFORMIN (GLUCOPHAGE) 1,000 mg tablet Take 1 tablet (1,000 mg total) by mouth daily with breakfast. 90 tablet 0   • multivit-min/folic/vit K/lycop (ONE-A-DAY MEN'S MULTIVITAMIN ORAL) Take by mouth.     • naltrexone (DEPADE) 50 mg tablet Take 1 tablet (50 mg total) by mouth daily. 90 tablet 0   • traZODone (DESYREL) 50 mg tablet Take by mouth as needed.         No current facility-administered medications on file prior to visit.       Patient has no known allergies.      Assessment/Plan   Diagnoses and all orders for this visit:    Class 1 obesity with serious comorbidity and body mass index (BMI) of 33.0 to 33.9 in adult, unspecified obesity type (Primary)  Assessment & Plan:  Continue to take Naltrexone 50 mg daily.     Our next appointment is 9/28/21 at 9:00 [facility].     Continue to focus on food type, timing and teamwork. Encouraged adequate protein intake to reduce hunger, low insulin inducing foods to balance insulin  and blood sugar. Encouraged adequate cardiovascular activity and increased nonexercise activity daily. Continue to balance stress with relaxation techniques and aim for adequate sleep nightly.          Prediabetes  Assessment & Plan:  Continue with weight loss strategies. See plan outlined in obesity section.         Time Spent:  I spent 20 minutes on this date of service performing the following activities: obtaining history, documenting, preparing for visit, obtaining / reviewing records, providing counseling and education, independently reviewing study/studies, communicating results and coordinating care.   15-Apr-2021

## 2021-09-13 NOTE — TELEPHONE ENCOUNTER
Medicine Refill Request    Last Office Visit: 5/26/2021  Last Telemedicine Visit: 8/3/2021 Shayy Taveras CRNP    Next Office Visit: 9/28/2021  Next Telemedicine Visit: Visit date not found         Current Outpatient Medications:   •  albuterol HFA (VENTOLIN HFA) 90 mcg/actuation inhaler, INHALE 1 PUFF BY MOUTH EVERY 4 HOURS AS NEEDED, Disp: , Rfl:   •  aspirin (ASPIR-81) 81 mg enteric coated tablet, Take 1 tablet (81 mg total) by mouth daily., Disp: 90 tablet, Rfl: 3  •  atorvastatin (LIPITOR) 20 mg tablet, Take 20 mg by mouth once daily., Disp: , Rfl:   •  BREO ELLIPTA 200-25 mcg/dose blister with device powder for inhalation, as needed.  , Disp: , Rfl:   •  buPROPion XL (WELLBUTRIN XL) 150 mg 24 hr tablet, TAKE 1 TABLET BY MOUTH EVERY DAY, Disp: 90 tablet, Rfl: 0  •  coenzyme Q10 (COQ10) 100 mg capsule, Take 100 mg by mouth daily., Disp: , Rfl:   •  meloxicam (MOBIC) 7.5 mg tablet, TAKE 1 TABLET BY MOUTH EVERY DAY DAILY ORALLY 30 DAYS, Disp: , Rfl:   •  metFORMIN (GLUCOPHAGE) 1,000 mg tablet, Take 1 tablet (1,000 mg total) by mouth daily with breakfast., Disp: 90 tablet, Rfl: 0  •  multivit-min/folic/vit K/lycop (ONE-A-DAY MEN'S MULTIVITAMIN ORAL), Take by mouth., Disp: , Rfl:   •  naltrexone (DEPADE) 50 mg tablet, Take 1 tablet (50 mg total) by mouth daily., Disp: 90 tablet, Rfl: 0  •  traZODone (DESYREL) 50 mg tablet, Take by mouth as needed.  , Disp: , Rfl:       BP Readings from Last 3 Encounters:   04/15/21 118/70   03/15/21 128/80   02/15/21 118/78       Recent Lab results:  No results found for: CHOL, No results found for: HDL, No results found for: LDLCALC, No results found for: TRIG     Lab Results   Component Value Date    GLUCOSE 112 (H) 04/20/2021   , No results found for: HGBA1C      Lab Results   Component Value Date    CREATININE 0.87 04/20/2021       No results found for: TSH

## 2021-09-14 RX ORDER — BUPROPION HYDROCHLORIDE 150 MG/1
TABLET ORAL
Qty: 90 TABLET | Refills: 0 | Status: SHIPPED | OUTPATIENT
Start: 2021-09-14 | End: 2022-09-28 | Stop reason: ALTCHOICE

## 2021-09-24 ENCOUNTER — TELEPHONE (OUTPATIENT)
Dept: BARIATRICS/WEIGHT MGMT | Facility: CLINIC | Age: 76
End: 2021-09-24

## 2021-09-24 NOTE — TELEPHONE ENCOUNTER
Sudheer called to cancel his appt on Tuesday with you as he is going to go at his weight loss on his own now. He said that you did a great job with him.  He would like to speak with you though.      Thank you,     Radha :)

## 2021-09-27 ENCOUNTER — TELEPHONE (OUTPATIENT)
Dept: SCHEDULING | Facility: CLINIC | Age: 76
End: 2021-09-27

## 2021-09-27 NOTE — TELEPHONE ENCOUNTER
I am not here Wednesday and Thursday so looks like it would have to stay as scheduled if he can make it.

## 2021-09-27 NOTE — TELEPHONE ENCOUNTER
Pt called to see if he can push back his appt time to a later time tomorrow or reschedule for Wednesday or Thursday. If not, pt will keep appt.    Pt can be reached at [phone]

## 2021-09-28 ENCOUNTER — OFFICE VISIT (OUTPATIENT)
Dept: CARDIOLOGY | Facility: CLINIC | Age: 76
End: 2021-09-28
Payer: MEDICARE

## 2021-09-28 VITALS
BODY MASS INDEX: 36.25 KG/M2 | OXYGEN SATURATION: 96 % | SYSTOLIC BLOOD PRESSURE: 124 MMHG | HEIGHT: 73 IN | HEART RATE: 81 BPM | WEIGHT: 273.5 LBS | DIASTOLIC BLOOD PRESSURE: 70 MMHG

## 2021-09-28 DIAGNOSIS — E78.5 HYPERLIPIDEMIA, UNSPECIFIED HYPERLIPIDEMIA TYPE: ICD-10-CM

## 2021-09-28 DIAGNOSIS — R73.03 PREDIABETES: ICD-10-CM

## 2021-09-28 DIAGNOSIS — G47.33 OSA ON CPAP: ICD-10-CM

## 2021-09-28 DIAGNOSIS — Z91.89 AT RISK FOR CORONARY ARTERY DISEASE: Primary | ICD-10-CM

## 2021-09-28 DIAGNOSIS — E66.812 CLASS 2 OBESITY WITHOUT SERIOUS COMORBIDITY WITH BODY MASS INDEX (BMI) OF 37.0 TO 37.9 IN ADULT, UNSPECIFIED OBESITY TYPE: ICD-10-CM

## 2021-09-28 DIAGNOSIS — I51.7 CARDIOMEGALY: ICD-10-CM

## 2021-09-28 PROCEDURE — 99214 OFFICE O/P EST MOD 30 MIN: CPT | Performed by: INTERNAL MEDICINE

## 2021-09-28 PROCEDURE — 93000 ELECTROCARDIOGRAM COMPLETE: CPT | Performed by: INTERNAL MEDICINE

## 2021-09-28 NOTE — PROGRESS NOTES
"   Cardiology Note          HPI   Sudheer Mayorga is a 76 y.o. male who presents for cardiovascular assessment.    He is a pleasant 76 y.o. with a history of prediabetes, dyslipidemia, sleep apnea on CPAP and morbid obesity who initially presented to me September 2020 to establish care and advice on reducing cardiovascular risk.  At that present time he did not formally exercise. He tells me he is battled weight loss his adult life.  He is tried numerous diets with only modest success before he stops.  He had recently got up to 295 pounds and improved his eating to come down to 285 pounds at our initial visit.  His weight has fluctuated between 275 and 295 pounds in recent years.  He has considered bariatric surgery and asked my opinion on this.  We discussed in all out effort short of this prior to considering but that could potentially be an option even at his age.  He has chronic tendinitis of his right knee but feels it would not prevent him from doing a daily walking program.  He also recognizes it may improve from losing weight.      At his last visit 1 year ago I referred him to Dr. Garcia for weight loss management.  He initially did well and lost 40 pounds, but he is gained 25 pounds back in the last few months.  He tells me he spent a lot of the singletary in [place] and had a bad diet there but is afraid to get back on track with his diet.  He plans to no longer follow with Dr. Garcia despite good success because he feels he now has the tools and knowledge to do it himself.  He is starting Nut[employer]ystem.  He is stop CPAP due to the recall.  Overall he feels very well and is started exercising at [employer].  He does complain of cramps in his left hand and occasionally legs the last couple of weeks.  He did stop taking magnesium about 2 weeks ago.  He reports his blood pressure has been good when checked, usually about \"125/80\". Denies chest pain, shortness of breath at rest, palpitations, orthopnea, " paroxysmal nocturnal dyspnea, presyncope, syncope.          Past Medical History:   Diagnosis Date   • Allergic rhinitis    • Asthma    • Depression    • Knee tendinitis     right   • Lipid disorder    • HONEY on CPAP    • Prediabetes      Past Surgical History:   Procedure Laterality Date   • HERNIA REPAIR     • ROTATOR CUFF REPAIR         SOCIAL HISTORY   reports that he has never smoked. He has never used smokeless tobacco. He reports current alcohol use. He reports that he does not use drugs.   4 daughters ages 39-45 as of . .  He works as a .    Family Status   Relation Name Status   • Bio Mother   at age 91        CVA at 88   • Bio Father   at age 92        DM onset 77yo        ALLERGIES  Patient has no known allergies.      Outpatient Encounter Medications as of 2021:   •  albuterol HFA (VENTOLIN HFA) 90 mcg/actuation inhaler, INHALE 1 PUFF BY MOUTH EVERY 4 HOURS AS NEEDED  •  aspirin (ASPIR-81) 81 mg enteric coated tablet, Take 1 tablet (81 mg total) by mouth daily.  •  atorvastatin (LIPITOR) 20 mg tablet, Take 20 mg by mouth once daily.  •  BREO ELLIPTA 200-25 mcg/dose blister with device powder for inhalation, Inhale 1 puff as needed.    •  buPROPion XL (WELLBUTRIN XL) 150 mg 24 hr tablet, TAKE 1 TABLET BY MOUTH EVERY DAY  •  coenzyme Q10 (COQ10) 100 mg capsule, Take 100 mg by mouth daily.  •  meloxicam (MOBIC) 7.5 mg tablet, TAKE 1 TABLET BY MOUTH EVERY DAY DAILY ORALLY 30 DAYS  •  metFORMIN (GLUCOPHAGE) 1,000 mg tablet, Take 1 tablet (1,000 mg total) by mouth daily with breakfast.  •  multivit-min/folic/vit K/lycop (ONE-A-DAY MEN'S MULTIVITAMIN ORAL), Take 1 tablet by mouth daily.    •  naltrexone (DEPADE) 50 mg tablet, Take 1 tablet (50 mg total) by mouth daily.  •  traZODone (DESYREL) 50 mg tablet, Take by mouth as needed.    •  [DISCONTINUED] buPROPion XL (WELLBUTRIN XL) 150 mg 24 hr tablet, TAKE 1 TABLET BY MOUTH EVERY DAY    ROS  As per HPI.  Otherwise  "all systems are reviewed and are negative.    Objective   Visit Vitals  /70   Pulse 81   Ht 1.842 m (6' 0.5\")   Wt 124 kg (273 lb 8 oz)   SpO2 96%   BMI 36.58 kg/m²       Wt Readings from Last 3 Encounters:   09/28/21 124 kg (273 lb 8 oz)   05/26/21 112 kg (247 lb 4.8 oz)   05/06/21 113 kg (249 lb 12.8 oz)       Physical Exam  Vitals reviewed.   Constitutional:       Appearance: He is well-developed.   HENT:      Head: Normocephalic and atraumatic.   Eyes:      General: No scleral icterus.  Neck:      Vascular: No JVD.   Cardiovascular:      Rate and Rhythm: Normal rate and regular rhythm.      Heart sounds: Normal heart sounds.   Pulmonary:      Breath sounds: Normal breath sounds.   Musculoskeletal:         General: Normal range of motion.   Skin:     General: Skin is warm and dry.   Neurological:      Mental Status: He is alert and oriented to person, place, and time.   Psychiatric:         Judgment: Judgment normal.         Lab Results   Component Value Date    GLUCOSE 112 (H) 04/20/2021    CALCIUM 9.1 04/20/2021     04/20/2021    K 4.3 04/20/2021    CO2 31 04/20/2021     04/20/2021    BUN 16 04/20/2021    CREATININE 0.87 04/20/2021     No results found for: ALT, AST, GGT, ALKPHOS, BILITOT  No results found for: WBC, HGB, HCT, MCV, PLT  No results found for: TSH  No results found for: CHOL  No results found for: HDL  No results found for: LDLCALC  No results found for: TRIG  No results found for: CHOLHDL  No results found for: HGBA1C    Labs 5/26/2020:  Chol 141, HDL 42, trig 79, LDL 83    Labs 9/1/2020:  BUN 18, creat 0.92, na 141, k4.3, normal LFTs. TSH 1.56. FnoQ0c=8.9.     Labs 8/26/2021:  Glucose 113, BUN 21, creatinine 0.95, sodium 141, potassium 4.5, liver function test are normal, hemoglobin A1c 5.8, cholesterol 148, HDL 51, triglycerides 58, LDL 84.   EKG    Performed on 09/28/2021 and personally reviewed:  Sinus  Rhythm at 81bpm    Imaging/Testing    PFTs March 2020:  Mild " restriction    Sleep Study 2018  AHI 72.    ASSESSMENT AND PLAN    1. Cardiovascular exam. His cardiovascular exam including heart and carotids are normal today. His EKG last year showed minor abnormalities of left axis deviation and borderline low voltage common in obesity.  He has lost a bit of weight and his EKG looks relatively normal today.  His lipids are acceptable on Atorvastatin as is his A1C on Metformin. We discussed weight loss would go a long way in reducing his risk factors for CV disease.  He plans to start walking at least twice a week, but we discussed discussed the efficacy of trying to do it nearly every day of the week. Due to good activity tolerance without CV symptoms I have not ordered stress testing for now.    2.  Cardiomegaly.  Although did not come up at last visit, he reports today remembering he was told about 20 years ago or so that he has an enlarged heart.  He does not remember what kind of test led to this comment.  He does not recall ever having an echocardiogram.  I suspect this may have been part of a CAT scan or possibly chest x-ray as this is commonly how this is mention.  Due to this mention of cardiomegaly 20 years ago I have ordered an echocardiogram.    3. Obesity. We discussed how much of his medical issues would be eliminated or improved with weight loss including his prediabetes, dyslipidemia and sleep apnea. His weight has generally been 275-295 pounds in the last several years.  He lost 40 pounds with Dr. Garcia, but unfortunately gained 25 pounds back in the summer.  He now has a plan to add cardio exercise into his regimen as well as start a [employer] diet.  He will also continue the medications Dr. Garcia had recommended.      4. Dyslipidemia. Well controlled on Atorvatstatin 20 mg Daily.  Would target LDL of 100 or less which he currently is meeting this target.    5. HONEY. Reports AHI went from 72 to 7 with treatment for which he remains compliant with.  Follows with Dr. Hercules.  He felt his sleep apnea is probably significantly milder if not cured with weight loss.  We discussed that a very well could be improved, but it was diagnosed as being very severe and very unlikely to not be an issue.  His current CPAP machine has been recalled and he will look to get a new one.    6. Prediabtes. Controlled with Metformin. We discussed weight loss.        Thank you for allowing me to participate in the care of this patient. I will plan to see him in 1 year.  Please do not hesitate to contact me with any questions or concerns.    Sincerely,  Declan Gray MD  9/28/2021

## 2021-10-22 NOTE — TELEPHONE ENCOUNTER
Medicine Refill Request    Last Office Visit: 5/26/2021  Last Telemedicine Visit: 8/3/2021 Shayy Taveras CRNP    Next Office Visit: 12/7/2021  Next Telemedicine Visit: Visit date not found         Current Outpatient Medications:   •  albuterol HFA (VENTOLIN HFA) 90 mcg/actuation inhaler, INHALE 1 PUFF BY MOUTH EVERY 4 HOURS AS NEEDED, Disp: , Rfl:   •  aspirin (ASPIR-81) 81 mg enteric coated tablet, Take 1 tablet (81 mg total) by mouth daily., Disp: 90 tablet, Rfl: 3  •  atorvastatin (LIPITOR) 20 mg tablet, Take 20 mg by mouth once daily., Disp: , Rfl:   •  BREO ELLIPTA 200-25 mcg/dose blister with device powder for inhalation, Inhale 1 puff as needed.  , Disp: , Rfl:   •  buPROPion XL (WELLBUTRIN XL) 150 mg 24 hr tablet, TAKE 1 TABLET BY MOUTH EVERY DAY, Disp: 90 tablet, Rfl: 0  •  coenzyme Q10 (COQ10) 100 mg capsule, Take 100 mg by mouth daily., Disp: , Rfl:   •  meloxicam (MOBIC) 7.5 mg tablet, TAKE 1 TABLET BY MOUTH EVERY DAY DAILY ORALLY 30 DAYS, Disp: , Rfl:   •  metFORMIN (GLUCOPHAGE) 1,000 mg tablet, Take 1 tablet (1,000 mg total) by mouth daily with breakfast., Disp: 90 tablet, Rfl: 0  •  multivit-min/folic/vit K/lycop (ONE-A-DAY MEN'S MULTIVITAMIN ORAL), Take 1 tablet by mouth daily.  , Disp: , Rfl:   •  naltrexone (DEPADE) 50 mg tablet, Take 1 tablet (50 mg total) by mouth daily., Disp: 90 tablet, Rfl: 0  •  traZODone (DESYREL) 50 mg tablet, Take by mouth as needed.  , Disp: , Rfl:       BP Readings from Last 3 Encounters:   09/28/21 124/70   04/15/21 118/70   03/15/21 128/80       Recent Lab results:  No results found for: CHOL, No results found for: HDL, No results found for: LDLCALC, No results found for: TRIG     Lab Results   Component Value Date    GLUCOSE 112 (H) 04/20/2021   , No results found for: HGBA1C      Lab Results   Component Value Date    CREATININE 0.87 04/20/2021       No results found for: TSH

## 2021-10-25 RX ORDER — METFORMIN HYDROCHLORIDE 1000 MG/1
TABLET ORAL
Qty: 90 TABLET | Refills: 0 | Status: SHIPPED | OUTPATIENT
Start: 2021-10-25 | End: 2022-09-28 | Stop reason: ALTCHOICE

## 2021-10-26 ENCOUNTER — TELEPHONE (OUTPATIENT)
Dept: BARIATRICS/WEIGHT MGMT | Facility: CLINIC | Age: 76
End: 2021-10-26

## 2021-10-26 NOTE — TELEPHONE ENCOUNTER
I called and left a message for Sudheer per his request.  He was encouraged to call back if he had any questions or concerns.

## 2021-11-23 ENCOUNTER — HOSPITAL ENCOUNTER (OUTPATIENT)
Dept: CARDIOLOGY | Facility: HOSPITAL | Age: 76
Discharge: HOME | End: 2021-11-23
Attending: INTERNAL MEDICINE
Payer: MEDICARE

## 2021-11-23 DIAGNOSIS — I51.7 CARDIOMEGALY: ICD-10-CM

## 2021-11-23 PROCEDURE — 93306 TTE W/DOPPLER COMPLETE: CPT | Mod: 26 | Performed by: INTERNAL MEDICINE

## 2021-11-23 PROCEDURE — 93306 TTE W/DOPPLER COMPLETE: CPT

## 2021-11-24 LAB
AORTIC ROOT ANNULUS - M-MODE: 3.3 CM
ASCENDING AORTA: 4 CM
AV PEAK GRADIENT: 9 MMHG
AV PEAK VELOCITY-S: 1.51 M/S
AV VALVE AREA: 2.68 CM2
CUSP SEPARATION: 2.3 CM
DOP CALC LVOT STROKE VOLUME: 85.85 ML
E WAVE DECELERATION TIME: 264 MS
E/A RATIO: 1.2
E/E' RATIO: 11.2
E/LAT E' RATIO: 7.8
EDV (BP): 113 CM3
EF (A4C): 83.6 %
EF A2C: 65 %
EJECTION FRACTION: 75.4 %
EST RIGHT VENT SYSTOLIC PRESSURE BY TRICUSPID REGURGITATION JET: 29 MMHG
ESV (BP): 27.8 CM3
FRACTIONAL SHORTENING: 40.3 %
INTERVENTRICULAR SEPTUM: 0.95 CM
LA ESV (BP): 61.5 CM3
LAAS-AP2: 24 CM2
LAAS-AP4: 16.6 CM2
LAD 2D: 3.6 CM
LALD A4C: 5.16 CM
LALD A4C: 5.92 CM
LAV-S: 78.5 CM3
LEFT ATRIUM VOLUME: 42.2 CM3
LEFT INTERNAL DIMENSION IN SYSTOLE: 2.79 CM
LEFT VENTRICLE DIASTOLIC VOLUME: 121 CM3
LEFT VENTRICLE SYSTOLIC VOLUME: 19.9 CM3
LEFT VENTRICULAR INTERNAL DIMENSION IN DIASTOLE: 4.67 CM
LEFT VENTRICULAR POSTERIOR WALL IN END DIASTOLE: 1.05 CM
LV DIASTOLIC VOLUME: 103 CM3
LV ESV (APICAL 2 CHAMBER): 36 CM3
LVAD-AP2: 31.6 CM2
LVAD-AP4: 35.1 CM2
LVAS-AP2: 17 CM2
LVAS-AP4: 12 CM2
LVLD-AP2: 8.17 CM
LVLD-AP4: 8.42 CM
LVLS-AP2: 7.12 CM
LVLS-AP4: 6.55 CM
LVOT 2D: 2.1 CM
LVOT A: 3.46 CM2
LVOT MG: 3 MMHG
LVOT MV: 0.83 M/S
LVOT PEAK VELOCITY: 1.17 M/S
LVOT VTI: 24.8 CM
MV E'TISSUE VEL-LAT: 0.1 M/S
MV E'TISSUE VEL-MED: 0.07 M/S
MV PEAK A VEL: 0.68 M/S
MV PEAK E VEL: 0.79 M/S
MV VALVE AREA P 1/2 METHOD: 2.86 CM2
POSTERIOR WALL: 1.05 CM
RVOT VMAX: 0.68 M/S
RVOT VTI: 17.4 CM
SEPTAL TISSUE DOPPLER FREE WALL LATE DIA VELOCITY (APICAL 4 CHAMBER VIEW): 0.1 M/S
TR MAX PG: 25 MMHG
TRICUSPID VALVE PEAK REGURGITATION VELOCITY: 2.52 M/S

## 2021-12-15 ENCOUNTER — TELEPHONE (OUTPATIENT)
Dept: SCHEDULING | Facility: CLINIC | Age: 76
End: 2021-12-15
Payer: MEDICARE

## 2021-12-15 NOTE — TELEPHONE ENCOUNTER
Pt called to review results of echo from 11/23 w/ Dr. Gray.     Pt also wanted it noted that he had OV w/ urologist Dr. Edinson Frausto today and his BP during visit was 160/70.    Pt can be reached at #[phone] and will be available after 3PM today.     Ty.

## 2021-12-15 NOTE — TELEPHONE ENCOUNTER
I called the pt and read him the results that Dr. Gray wrote to the pt on the echo. He verbalized understanding. He reports his gf Bettina had an aortic dissection 14 yrs ago b/c of high BP so he is familiar w/ the aorta.     I reviewed his meds. He remains on Meloxicam daily for his knee w/ tendonitis for approx the past 2 yrs. He will get another appt w/ his ortho MD to see if they would like to put him on another med/treatment plan, as I notified him the Meloxicam can raise his BP.     He lost 40 lbs but then put back on 20 lbs. He reports he has been working at the gym to put on more muscle weight.     He denies Has, dizziness, SOB.     He doesn't watch his salt/sodium intake.     SD: He reports his PCP stopped his ASA approx 2 months ago. He also would like to know how frequently he should f/u w/ echos or other tests for his aorta and also if he should f/u w/ any other specialists. He will monitor his BP and record w/ his home cuff.

## 2021-12-16 NOTE — TELEPHONE ENCOUNTER
I called the pt and left a VM notifying that Dr. Gray is ok w/ him stopping the ASA and he recommends a f/u echo in 1 yr and does not feel an aorta specialist is needed at this time.

## 2021-12-20 ENCOUNTER — HOSPITAL ENCOUNTER (OUTPATIENT)
Dept: RADIOLOGY | Age: 76
Discharge: HOME | End: 2021-12-20
Attending: ORTHOPAEDIC SURGERY
Payer: MEDICARE

## 2021-12-20 ENCOUNTER — TRANSCRIBE ORDERS (OUTPATIENT)
Dept: SCHEDULING | Age: 76
End: 2021-12-20

## 2021-12-20 DIAGNOSIS — M25.561 PAIN IN RIGHT KNEE: Primary | ICD-10-CM

## 2021-12-20 DIAGNOSIS — M25.561 PAIN IN RIGHT KNEE: ICD-10-CM

## 2021-12-21 ENCOUNTER — TELEPHONE (OUTPATIENT)
Dept: BARIATRICS/WEIGHT MGMT | Facility: CLINIC | Age: 76
End: 2021-12-21
Payer: MEDICARE

## 2021-12-21 NOTE — TELEPHONE ENCOUNTER
Returned Mr Mayorga's call to get more  information as he has not been seen in out office since August. Pt states that he only wanted our address and was not calling for care. Address was given.

## 2021-12-21 NOTE — TELEPHONE ENCOUNTER
Sudheer called today and would like to speak to you about his situation, that is all he said.  I tried to get more, but he would not give any information.     Thanks,   Radha :)

## 2022-01-26 ENCOUNTER — TELEPHONE (OUTPATIENT)
Dept: SCHEDULING | Facility: CLINIC | Age: 77
End: 2022-01-26
Payer: MEDICARE

## 2022-01-26 NOTE — TELEPHONE ENCOUNTER
I called and s/w the pt. He reports he gave a personal (non-medical) letter to Dr. Gray a few months ago and requesting a callback to discuss w/ him.

## 2022-01-26 NOTE — TELEPHONE ENCOUNTER
Pt requesting to speak with Dr Gray.  Pt states he would like to follow up in regards to their previous conversation about pt mailing something.  Pt can be reached at [phone].

## 2022-02-08 NOTE — TELEPHONE ENCOUNTER
Pt called to f/u on previous request for a call back from Dr. Gray.     Pt can be reached at #[phone].    Ty.

## 2022-04-08 ENCOUNTER — OFFICE VISIT (OUTPATIENT)
Dept: NEUROLOGY | Facility: CLINIC | Age: 77
End: 2022-04-08
Payer: MEDICARE

## 2022-04-08 VITALS — HEART RATE: 74 BPM | SYSTOLIC BLOOD PRESSURE: 132 MMHG | DIASTOLIC BLOOD PRESSURE: 80 MMHG

## 2022-04-08 DIAGNOSIS — R25.1 TREMOR: Primary | ICD-10-CM

## 2022-04-08 PROCEDURE — 99204 OFFICE O/P NEW MOD 45 MIN: CPT | Performed by: PSYCHIATRY & NEUROLOGY

## 2022-04-08 ASSESSMENT — PAIN SCALES - GENERAL: PAINLEVEL: 0-NO PAIN

## 2022-04-08 NOTE — ASSESSMENT & PLAN NOTE
The patient presents complaining of what sounds like a benign essential tremor.  He has a high-frequency low amplitude tremor involving both hands with posture and action.  His examination revealed this although he did have a subtle rest tremor in the right hand.  He has no other signs or symptoms of parkinsonism or Parkinson's disease.    We spoke at length regarding the diagnosis, pathophysiology, treatment and prognosis of tremor.  For now we will check basic blood work ruling out systemic causes of his condition.  At this point we decided to hold off on any medications as his tremor is only a minor nuisance which does not negatively impact his activities of daily living or quality of life in any meaningful way.  In the future, additional diagnostics and treatments will depend on his clinical course and the results of his pending studies.

## 2022-04-08 NOTE — PROGRESS NOTES
Sudheer Mayorga is a 76 y.o. male  4/8/2022  Kamran Wilson MD    Neurology Consult Note    Subjective     Sudheer Mayorga is a 76 y.o. male who is being evaluated for tremor.  The patient reported that approximately 6 months ago he noted a tremor in his hands.  His symptoms started without any clear illness, trauma or inciting event.  He has a high-frequency low amplitude tremor in both hands, left nondominant more than right.  His tremor is really only present with posture and action and absent at rest.  He has been unable to identify a trigger for the tremor or factors that make it better or worse.  Alcohol has no effect on his shaking.  There is no family history of tremor.    The patient reported that he notices the tremor mostly when he is driving.  If he is holding the steering wheel loosely his hand will shake.  If he holds it more forcefully the shaking goes away.  He also notices the tremor when he is pouring water, using utensils such as a spoon with soup, handwriting etc.  Over the past 6 months his tremor has been stable.  He has had no change in his sense of smell or taste.  He has had no difficulty talking or swallowing.  He has no constipation.  He is not off balance.  He has been eating well.  He sleeps with a CPAP.  His mood is good.  There were no symptoms to suggest increased intracranial pressure, meningitis or systemic illness.  He has otherwise had no episodes of transient or static neurologic dysfunction.    Review of Systems  Constitutional: negative  Eyes: negative  Ears, nose, mouth, throat, and face: negative  Respiratory: negative  Cardiovascular: negative  Gastrointestinal: negative  Genitourinary:negative  Integument/breast: negative  Hematologic/lymphatic: negative  Musculoskeletal:negative  Neurological: negative  Behavioral/Psych: negative  Endocrine: negative  Allergic/Immunologic: negative    Allergy:  No Known Allergies    Current Outpatient Medications   Medication Sig Dispense  Refill   • albuterol HFA (VENTOLIN HFA) 90 mcg/actuation inhaler INHALE 1 PUFF BY MOUTH EVERY 4 HOURS AS NEEDED     • atorvastatin (LIPITOR) 20 mg tablet Take 20 mg by mouth once daily.     • BREO ELLIPTA 200-25 mcg/dose blister with device powder for inhalation Inhale 1 puff as needed.       • buPROPion XL (WELLBUTRIN XL) 150 mg 24 hr tablet TAKE 1 TABLET BY MOUTH EVERY DAY 90 tablet 0   • coenzyme Q10 (COQ10) 100 mg capsule Take 100 mg by mouth daily.     • meloxicam (MOBIC) 7.5 mg tablet TAKE 1 TABLET BY MOUTH EVERY DAY DAILY ORALLY 30 DAYS     • metFORMIN 1,000 mg tablet TAKE 1 TABLET BY MOUTH EVERY DAY WITH BREAKFAST 90 tablet 0   • multivit-min/folic/vit K/lycop (ONE-A-DAY MEN'S MULTIVITAMIN ORAL) Take 1 tablet by mouth daily.       • naltrexone (DEPADE) 50 mg tablet Take 1 tablet (50 mg total) by mouth daily. 90 tablet 0   • traZODone (DESYREL) 50 mg tablet Take by mouth as needed.         No current facility-administered medications for this visit.       Past Medical History:  Past Medical History:   Diagnosis Date   • Allergic rhinitis    • Asthma    • Depression    • Knee tendinitis     right   • Lipid disorder    • HONEY on CPAP    • Prediabetes        Past Surgical History:  Past Surgical History:   Procedure Laterality Date   • HERNIA REPAIR     • ROTATOR CUFF REPAIR         Social History:  Social History     Socioeconomic History   • Marital status:      Spouse name: None   • Number of children: None   • Years of education: None   • Highest education level: None   Tobacco Use   • Smoking status: Never Smoker   • Smokeless tobacco: Never Used   Substance and Sexual Activity   • Alcohol use: Yes     Comment: social   • Drug use: Never   • Sexual activity: Not Currently   Social History Narrative    Live with partner.       Family History:  Family History   Problem Relation Age of Onset   • Stroke Biological Mother    • Diabetes Biological Father        Objective     Physical Exam  Visit Vitals  BP  132/80 (BP Location: Right upper arm, Patient Position: Sitting)   Pulse 74       General Appearance:  Alert, no distress, appears stated age  There was no parkinsonism   Head:  Normocephalic, without obvious abnormality, atraumatic   Eyes:  PERRL, conjunctiva/corneas clear, EOM's intact   Throat:  The tongue and uvula were midline   Neck: Supple, symmetrical, trachea midline, no carotid bruit or JVD   Lungs:  Clear to auscultation bilaterally, respirations unlabored   Chest Wall: No tenderness or deformity   Heart Regular rate and rhythm, S1 and S2 normal, no murmur, rub or gallop   Extremities: Extremities normal, atraumatic, no cyanosis or edema    Musculoskeletal: No injury or deformity   Pulses: 2+ and symmetric all extremities   Skin: Skin color, texture, turgor normal, no rashes or lesions   Behavior/Emotional: Appropriate, cooperative   Neurologic Exam:  Alert and oriented. Attention, concentration, memory, language, visual spatial orientation, executive function is normal.    Pupils equal round and reactive to light. Extraocular movement full with normal pursuit + saccades. No nystagmus noted.   Facial strength and sensation is normal. Hearing normal.  The tongue and uvula were midline. No dysarthria or dysphagia.   Strength was 5/5 in bulbar, axial + extremity muscles.  There was a rest tremor in the right hand.  There is a high-frequency low amplitude tremor in the hands with posture and action.  The sensory examination was normal to touch, temperature and pain, vibration and proprioception. There was no dysmetria or cerebellar signs.   The gait was narrow based. Patient was able to tandem walk. Negative Romberg sign. Reflexes were ++ and symmetric. Pagan sign was negative. Plantar responses were flexor.         Problem List Items Addressed This Visit        Other    Tremor - Primary    Current Assessment & Plan     The patient presents complaining of what sounds like a benign essential tremor.  He has  a high-frequency low amplitude tremor involving both hands with posture and action.  His examination revealed this although he did have a subtle rest tremor in the right hand.  He has no other signs or symptoms of parkinsonism or Parkinson's disease.    We spoke at length regarding the diagnosis, pathophysiology, treatment and prognosis of tremor.  For now we will check basic blood work ruling out systemic causes of his condition.  At this point we decided to hold off on any medications as his tremor is only a minor nuisance which does not negatively impact his activities of daily living or quality of life in any meaningful way.  In the future, additional diagnostics and treatments will depend on his clinical course and the results of his pending studies.           Relevant Orders    TSH    Sedimentation rate, automated            It was a real pleasure meeting Sudheer Mayorga today, thank you for allowing me to participate in the medical care. If you have any questions, please call me at any time. Sudheer Mayorga will follow up with me in the coming weeks to months and keep me updated by telephone. Sudheer Mayorga knows to notify me immediately if there is any change in the condition or if there are any new symptoms of transient or static neurologic dysfunction.    Jose D Heard MD

## 2022-04-08 NOTE — LETTER
April 8, 2022     Kamran Wilson MD  [address]  [address]  [facility] [address]    Patient: Sudheer Mayorga  YOB: 1945  Date of Visit: 4/8/2022      Dear Dr. Wilson:    Thank you for referring Sudheer Mayorga to me for evaluation. Below are my notes for this consultation.    If you have questions, please do not hesitate to call me. I look forward to following your patient along with you.         Sincerely,        Jose D Heard MD        CC: No Recipients  Jose D Heard MD  4/8/2022  7:58 AM  Signed  Sudheer Mayorga is a 76 y.o. male  4/8/2022  Kamran Wilson MD    Neurology Consult Note    Subjective     Sudheer Mayorga is a 76 y.o. male who is being evaluated for tremor.  The patient reported that approximately 6 months ago he noted a tremor in his hands.  His symptoms started without any clear illness, trauma or inciting event.  He has a high-frequency low amplitude tremor in both hands, left nondominant more than right.  His tremor is really only present with posture and action and absent at rest.  He has been unable to identify a trigger for the tremor or factors that make it better or worse.  Alcohol has no effect on his shaking.  There is no family history of tremor.    The patient reported that he notices the tremor mostly when he is driving.  If he is holding the steering wheel loosely his hand will shake.  If he holds it more forcefully the shaking goes away.  He also notices the tremor when he is pouring water, using utensils such as a spoon with soup, handwriting etc.  Over the past 6 months his tremor has been stable.  He has had no change in his sense of smell or taste.  He has had no difficulty talking or swallowing.  He has no constipation.  He is not off balance.  He has been eating well.  He sleeps with a CPAP.  His mood is good.  There were no symptoms to suggest increased intracranial pressure, meningitis or systemic illness.  He has otherwise had no episodes of  transient or static neurologic dysfunction.    Review of Systems  Constitutional: negative  Eyes: negative  Ears, nose, mouth, throat, and face: negative  Respiratory: negative  Cardiovascular: negative  Gastrointestinal: negative  Genitourinary:negative  Integument/breast: negative  Hematologic/lymphatic: negative  Musculoskeletal:negative  Neurological: negative  Behavioral/Psych: negative  Endocrine: negative  Allergic/Immunologic: negative    Allergy:  No Known Allergies    Current Outpatient Medications   Medication Sig Dispense Refill   • albuterol HFA (VENTOLIN HFA) 90 mcg/actuation inhaler INHALE 1 PUFF BY MOUTH EVERY 4 HOURS AS NEEDED     • atorvastatin (LIPITOR) 20 mg tablet Take 20 mg by mouth once daily.     • BREO ELLIPTA 200-25 mcg/dose blister with device powder for inhalation Inhale 1 puff as needed.       • buPROPion XL (WELLBUTRIN XL) 150 mg 24 hr tablet TAKE 1 TABLET BY MOUTH EVERY DAY 90 tablet 0   • coenzyme Q10 (COQ10) 100 mg capsule Take 100 mg by mouth daily.     • meloxicam (MOBIC) 7.5 mg tablet TAKE 1 TABLET BY MOUTH EVERY DAY DAILY ORALLY 30 DAYS     • metFORMIN 1,000 mg tablet TAKE 1 TABLET BY MOUTH EVERY DAY WITH BREAKFAST 90 tablet 0   • multivit-min/folic/vit K/lycop (ONE-A-DAY MEN'S MULTIVITAMIN ORAL) Take 1 tablet by mouth daily.       • naltrexone (DEPADE) 50 mg tablet Take 1 tablet (50 mg total) by mouth daily. 90 tablet 0   • traZODone (DESYREL) 50 mg tablet Take by mouth as needed.         No current facility-administered medications for this visit.       Past Medical History:  Past Medical History:   Diagnosis Date   • Allergic rhinitis    • Asthma    • Depression    • Knee tendinitis     right   • Lipid disorder    • HONEY on CPAP    • Prediabetes        Past Surgical History:  Past Surgical History:   Procedure Laterality Date   • HERNIA REPAIR     • ROTATOR CUFF REPAIR         Social History:  Social History     Socioeconomic History   • Marital status:      Spouse name:  None   • Number of children: None   • Years of education: None   • Highest education level: None   Tobacco Use   • Smoking status: Never Smoker   • Smokeless tobacco: Never Used   Substance and Sexual Activity   • Alcohol use: Yes     Comment: social   • Drug use: Never   • Sexual activity: Not Currently   Social History Narrative    Live with partner.       Family History:  Family History   Problem Relation Age of Onset   • Stroke Biological Mother    • Diabetes Biological Father        Objective     Physical Exam  Visit Vitals  /80 (BP Location: Right upper arm, Patient Position: Sitting)   Pulse 74       General Appearance:  Alert, no distress, appears stated age  There was no parkinsonism   Head:  Normocephalic, without obvious abnormality, atraumatic   Eyes:  PERRL, conjunctiva/corneas clear, EOM's intact   Throat:  The tongue and uvula were midline   Neck: Supple, symmetrical, trachea midline, no carotid bruit or JVD   Lungs:  Clear to auscultation bilaterally, respirations unlabored   Chest Wall: No tenderness or deformity   Heart Regular rate and rhythm, S1 and S2 normal, no murmur, rub or gallop   Extremities: Extremities normal, atraumatic, no cyanosis or edema    Musculoskeletal: No injury or deformity   Pulses: 2+ and symmetric all extremities   Skin: Skin color, texture, turgor normal, no rashes or lesions   Behavior/Emotional: Appropriate, cooperative   Neurologic Exam:  Alert and oriented. Attention, concentration, memory, language, visual spatial orientation, executive function is normal.    Pupils equal round and reactive to light. Extraocular movement full with normal pursuit + saccades. No nystagmus noted.   Facial strength and sensation is normal. Hearing normal.  The tongue and uvula were midline. No dysarthria or dysphagia.   Strength was 5/5 in bulbar, axial + extremity muscles.  There was a rest tremor in the right hand.  There is a high-frequency low amplitude tremor in the hands with  posture and action.  The sensory examination was normal to touch, temperature and pain, vibration and proprioception. There was no dysmetria or cerebellar signs.   The gait was narrow based. Patient was able to tandem walk. Negative Romberg sign. Reflexes were ++ and symmetric. Pagan sign was negative. Plantar responses were flexor.         Problem List Items Addressed This Visit        Other    Tremor - Primary    Current Assessment & Plan     The patient presents complaining of what sounds like a benign essential tremor.  He has a high-frequency low amplitude tremor involving both hands with posture and action.  His examination revealed this although he did have a subtle rest tremor in the right hand.  He has no other signs or symptoms of parkinsonism or Parkinson's disease.    We spoke at length regarding the diagnosis, pathophysiology, treatment and prognosis of tremor.  For now we will check basic blood work ruling out systemic causes of his condition.  At this point we decided to hold off on any medications as his tremor is only a minor nuisance which does not negatively impact his activities of daily living or quality of life in any meaningful way.  In the future, additional diagnostics and treatments will depend on his clinical course and the results of his pending studies.           Relevant Orders    TSH    Sedimentation rate, automated            It was a real pleasure meeting Sudheer Mayorga today, thank you for allowing me to participate in the medical care. If you have any questions, please call me at any time. Sudheer Mayorga will follow up with me in the coming weeks to months and keep me updated by telephone. Sudheer Mayorga knows to notify me immediately if there is any change in the condition or if there are any new symptoms of transient or static neurologic dysfunction.    Jose D Heard MD

## 2022-04-12 LAB
ERYTHROCYTE [SEDIMENTATION RATE] IN BLOOD BY WESTERGREN METHOD: 2 MM/H
TSH SERPL-ACNC: 1.19 MIU/L (ref 0.4–4.5)

## 2022-04-18 ENCOUNTER — TELEPHONE (OUTPATIENT)
Dept: NEUROLOGY | Facility: CLINIC | Age: 77
End: 2022-04-18

## 2022-04-19 RX ORDER — PRIMIDONE 50 MG/1
50 TABLET ORAL NIGHTLY
Qty: 30 TABLET | Refills: 3 | Status: SHIPPED | OUTPATIENT
Start: 2022-04-19 | End: 2022-07-15

## 2022-05-02 NOTE — TELEPHONE ENCOUNTER
Pt calling with an update on the primidone. He stated that the primidone is helping him and hes not getting his tremor until late morning around 11-12 pm. He is asking if you think he should increase the dose to hopefully eliminate the tremors?

## 2022-05-26 NOTE — TELEPHONE ENCOUNTER
Pt called stating that he has been taking the primidone as prescribed and he has been waking up in the morning with no tremors however he has been developing more tremors around noon everyday. He has had no side effects. He is wondering if he can start to take it bid to help more with his tremors?

## 2022-05-27 RX ORDER — PRIMIDONE 50 MG/1
50 TABLET ORAL 2 TIMES DAILY
Qty: 60 TABLET | Refills: 3 | Status: SHIPPED | OUTPATIENT
Start: 2022-05-27 | End: 2022-09-28 | Stop reason: ALTCHOICE

## 2022-06-03 ENCOUNTER — TRANSCRIBE ORDERS (OUTPATIENT)
Dept: SCHEDULING | Age: 77
End: 2022-06-03

## 2022-06-03 DIAGNOSIS — M17.11 UNILATERAL PRIMARY OSTEOARTHRITIS, RIGHT KNEE: Primary | ICD-10-CM

## 2022-06-08 ENCOUNTER — HOSPITAL ENCOUNTER (OUTPATIENT)
Dept: RADIOLOGY | Age: 77
Discharge: HOME | End: 2022-06-08
Attending: ORTHOPAEDIC SURGERY
Payer: MEDICARE

## 2022-06-08 DIAGNOSIS — M17.11 UNILATERAL PRIMARY OSTEOARTHRITIS, RIGHT KNEE: ICD-10-CM

## 2022-07-05 ENCOUNTER — TRANSCRIBE ORDERS (OUTPATIENT)
Dept: SCHEDULING | Age: 77
End: 2022-07-05

## 2022-07-05 ENCOUNTER — HOSPITAL ENCOUNTER (OUTPATIENT)
Dept: RADIOLOGY | Age: 77
Discharge: HOME | End: 2022-07-05
Attending: PHYSICIAN ASSISTANT
Payer: MEDICARE

## 2022-07-05 DIAGNOSIS — M25.561 PAIN IN RIGHT KNEE: Primary | ICD-10-CM

## 2022-07-05 DIAGNOSIS — M25.561 PAIN IN RIGHT KNEE: ICD-10-CM

## 2022-07-06 ENCOUNTER — HOSPITAL ENCOUNTER (EMERGENCY)
Facility: HOSPITAL | Age: 77
Discharge: HOME | End: 2022-07-06
Attending: EMERGENCY MEDICINE
Payer: MEDICARE

## 2022-07-06 ENCOUNTER — APPOINTMENT (EMERGENCY)
Dept: RADIOLOGY | Facility: HOSPITAL | Age: 77
End: 2022-07-06
Payer: MEDICARE

## 2022-07-06 VITALS
HEIGHT: 72 IN | RESPIRATION RATE: 20 BRPM | DIASTOLIC BLOOD PRESSURE: 80 MMHG | BODY MASS INDEX: 36.57 KG/M2 | TEMPERATURE: 97.9 F | OXYGEN SATURATION: 95 % | WEIGHT: 270 LBS | SYSTOLIC BLOOD PRESSURE: 140 MMHG | HEART RATE: 63 BPM
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
## 2022-07-06 DIAGNOSIS — S76.109A INJURY OF QUADRICEPS TENDON: ICD-10-CM

## 2022-07-06 DIAGNOSIS — S49.91XA INJURY OF RIGHT SHOULDER, INITIAL ENCOUNTER: ICD-10-CM

## 2022-07-06 DIAGNOSIS — S89.91XA INJURY OF RIGHT KNEE, INITIAL ENCOUNTER: ICD-10-CM

## 2022-07-06 PROCEDURE — 99283 EMERGENCY DEPT VISIT LOW MDM: CPT | Mod: 25

## 2022-07-06 PROCEDURE — 73030 X-RAY EXAM OF SHOULDER: CPT | Mod: RT

## 2022-07-06 PROCEDURE — 63700000 HC SELF-ADMINISTRABLE DRUG: Performed by: PHYSICIAN ASSISTANT

## 2022-07-06 PROCEDURE — 2W3QXYZ IMMOBILIZATION OF RIGHT LOWER LEG USING OTHER DEVICE: ICD-10-PCS | Performed by: EMERGENCY MEDICINE

## 2022-07-06 PROCEDURE — 73564 X-RAY EXAM KNEE 4 OR MORE: CPT | Mod: RT

## 2022-07-06 RX ORDER — MELOXICAM 7.5 MG/1
7.5 TABLET ORAL DAILY
Qty: 7 TABLET | Refills: 0 | Status: SHIPPED | OUTPATIENT
Start: 2022-07-06 | End: 2022-09-28 | Stop reason: ALTCHOICE

## 2022-07-06 RX ORDER — LIDOCAINE 560 MG/1
1 PATCH PERCUTANEOUS; TOPICAL; TRANSDERMAL ONCE
Status: DISCONTINUED | OUTPATIENT
Start: 2022-07-06 | End: 2022-07-06 | Stop reason: HOSPADM

## 2022-07-06 RX ORDER — IBUPROFEN 600 MG/1
600 TABLET ORAL ONCE
Status: COMPLETED | OUTPATIENT
Start: 2022-07-06 | End: 2022-07-06

## 2022-07-06 RX ORDER — LIDOCAINE 560 MG/1
1 PATCH PERCUTANEOUS; TOPICAL; TRANSDERMAL DAILY
Qty: 30 PATCH | Refills: 0 | Status: SHIPPED | OUTPATIENT
Start: 2022-07-06 | End: 2022-09-28 | Stop reason: ALTCHOICE

## 2022-07-06 RX ORDER — ACETAMINOPHEN 500 MG
500 TABLET ORAL EVERY 6 HOURS PRN
Qty: 20 TABLET | Refills: 0 | Status: SHIPPED | OUTPATIENT
Start: 2022-07-06 | End: 2022-07-11

## 2022-07-06 RX ORDER — ACETAMINOPHEN 325 MG/1
650 TABLET ORAL ONCE
Status: COMPLETED | OUTPATIENT
Start: 2022-07-06 | End: 2022-07-06

## 2022-07-06 RX ADMIN — LIDOCAINE 1 PATCH: 4 PATCH TOPICAL at 05:30

## 2022-07-06 RX ADMIN — LIDOCAINE 1 PATCH: 4 PATCH TOPICAL at 05:41

## 2022-07-06 RX ADMIN — ACETAMINOPHEN 650 MG: 325 TABLET, FILM COATED ORAL at 05:31

## 2022-07-06 RX ADMIN — IBUPROFEN 600 MG: 600 TABLET, FILM COATED ORAL at 05:31

## 2022-07-06 ASSESSMENT — ENCOUNTER SYMPTOMS
NUMBNESS: 0
VOMITING: 0
NAUSEA: 0
JOINT SWELLING: 1
DIZZINESS: 0
HEADACHES: 0
COLOR CHANGE: 0
LIGHT-HEADEDNESS: 0
BACK PAIN: 0
NECK PAIN: 0
ARTHRALGIAS: 1
ABDOMINAL PAIN: 0
SHORTNESS OF BREATH: 0
WEAKNESS: 0

## 2022-07-06 NOTE — ED PROVIDER NOTES
Emergency Medicine Note  HPI   HISTORY OF PRESENT ILLNESS     77-year-old male with PMH of asthma, depression, HL, right knee tendinitis presents to the emergency department for evaluation after fall PTA.  Patient states that he is getting dressed this morning when his right knee gave out.  Patient states that approximately week ago he went to sit down on the toilet and felt a pop in his right knee and has has had worsening pain since then.  Patient recently saw orthopedics for the pain in his right knee and had an MRI done yesterday.  Patient denies hitting his head or any LOC.  Patient states he did hit his right shoulder.  Patient states he did have another fall a week ago injuring his right knee, states that he heard a pop in that knee at that time.  Patient states he is having worsening right knee pain as well as new right shoulder pain.  Patient denies numbness or tingling.  Patient denies neck or back pain.  Patient denies any headaches, lightheadedness, dizziness, chest pain, shortness of breath, abdominal pain, nausea, or vomiting.       Trauma  Mechanism of injury: fall     Current symptoms:       Associated symptoms:             Denies abdominal pain, back pain, chest pain, headache, nausea, neck pain and vomiting.         Patient History   PAST HISTORY     Reviewed from Nursing Triage:         Past Medical History:   Diagnosis Date   • Allergic rhinitis    • Asthma    • Depression    • Knee tendinitis     right   • Lipid disorder    • HONEY on CPAP    • Prediabetes        Past Surgical History:   Procedure Laterality Date   • HERNIA REPAIR     • ROTATOR CUFF REPAIR         Family History   Problem Relation Age of Onset   • Stroke Biological Mother    • Diabetes Biological Father        Social History     Tobacco Use   • Smoking status: Never Smoker   • Smokeless tobacco: Never Used   Vaping Use   • Vaping Use: Never used   Substance Use Topics   • Alcohol use: Yes     Comment: social   • Drug use: Never          Review of Systems   REVIEW OF SYSTEMS     Review of Systems   Respiratory: Negative for shortness of breath.    Cardiovascular: Negative for chest pain.   Gastrointestinal: Negative for abdominal pain, nausea and vomiting.   Musculoskeletal: Positive for arthralgias and joint swelling. Negative for back pain and neck pain.   Skin: Negative for color change and rash.   Neurological: Negative for dizziness, syncope, weakness, light-headedness, numbness and headaches.         VITALS     ED Vitals    Date/Time Temp Pulse Resp BP SpO2 [facility]   07/06/22 0502 36.6 °C (97.9 °F) 60 18 140/80 96 % MR        Pulse Ox %: 96 % (07/06/22 0507)  Pulse Ox Interpretation: Normal (07/06/22 0507)  Heart Rate: 60 (07/06/22 0507)  Rhythm Strip Interpretation: Normal Sinus Rhythm (07/06/22 0507)     Physical Exam   PHYSICAL EXAM     Physical Exam  Vitals and nursing note reviewed.   Constitutional:       General: He is not in acute distress.     Appearance: Normal appearance. He is well-developed. He is not ill-appearing or diaphoretic.   HENT:      Head: Normocephalic and atraumatic.   Eyes:      Extraocular Movements: Extraocular movements intact.      Conjunctiva/sclera: Conjunctivae normal.   Cardiovascular:      Rate and Rhythm: Normal rate and regular rhythm.      Pulses: Normal pulses.      Heart sounds: Normal heart sounds.      Comments: 2+ radials and dorsalis pedis pulses bilaterally  Pulmonary:      Effort: Pulmonary effort is normal. No respiratory distress.      Breath sounds: Normal breath sounds. No wheezing or rales.   Abdominal:      General: Abdomen is flat. Bowel sounds are normal. There is no distension.      Palpations: Abdomen is soft.      Tenderness: There is no abdominal tenderness. There is no guarding or rebound.   Musculoskeletal:         General: Swelling and tenderness present.      Cervical back: Normal range of motion.      Comments: Patient has decreased range of motion of the right knee.  Patient  unable to straight leg raise his right leg.  Patient has swelling appreciated to the anterior aspect of the right knee with associated tenderness palpation.  Patient is mild tender palpation of the right shoulder.  Patient is able to flex, extend, abduct, and adduct his right shoulder however it is limited as patient states he has pain greater than 90 degrees.  Patient is full range of motion of his right elbow, wrist, hand, and fingers without difficulty.  Patient has full range of motion of his right ankle and toes without difficulty.  No tenderness to palpation of the right hip.  No midline spinal tenderness to palpation.   Skin:     General: Skin is warm and dry.      Capillary Refill: Capillary refill takes less than 2 seconds.   Neurological:      Mental Status: He is alert.   Psychiatric:         Mood and Affect: Mood normal.           PROCEDURES     Procedures     DATA     Results     None          Imaging Results    None         No orders to display       Scoring tools                                 ED Course & MDM   MDM / ED COURSE / CLINICAL IMPRESSIONS / DISPO     MDM  Number of Diagnoses or Management Options  Diagnosis management comments: 77-year-old male with PMH of asthma, depression, HL, right knee tendinitis presents to the emergency department for evaluation after fall PTA.  Differential diagnosis is broad.  Will get x-rays of the right shoulder and knee then reevaluate      ED Course as of 07/06/22 0557   Wed Jul 06, 2022   0514 Patient had MRI of the right knee on 7/5/2022 that revealed Near complete tear of the distal quadriceps tendon, progressed. New medial patellofemoral ligament and medial retinacular tear [KM]   0539 Creatinine level from August 2021 was not elevated. [KM]      ED Course User Index  [KM] Alesha Lay PA C         Clinical Impressions as of 07/06/22 0557   Fall, initial encounter   Injury of right shoulder, initial encounter   Injury of quadriceps tendon - Near  complete tear to the distal quadriceps on MRI from 7/5/22   Injury of right knee, initial encounter              Alesha Lay PA C  07/06/22 0601

## 2022-07-06 NOTE — DISCHARGE INSTRUCTIONS
Please call your orthopedic doctor today to schedule follow-up appointment as soon as possible for reevaluation    You can take meloxicam as prescribed as needed for pain.  Do not take any other NSAIDs when taking.    You can take Tylenol as prescribed as needed for pain.    You place lidocaine patches on your knee and shoulder as prescribed as needed to help with symptoms.    Please use walker as directed as needed to help with ambulation.    Please wear knee immobilizer at all times until you follow-up with orthopedics.    Please elevate and ice your knee in 20-minute increments as often as possible to help with swelling.     Please follow up as directed to obtain any final results and review your plan of care. CALL your primary doctor's office today to schedule a follow up appointment within the next 48 hours.       REVIEW AND DISCUSS ALL OF YOUR MEDICATIONS WITH YOUR PRIMARY CARE TEAM WITHIN THE NEXT 2 DAYS, even ones that you may have been on for a long time.      Radiology review of your studies (XRAYs, CT Scans, Ultrasounds, MRI scans) may still be pending. Preliminary results may be available today but final written reports may not be available until tomorrow. Important findings may be included in the final radiology review.       If instructed please CALL the Emergency Department at [phone] to obtain the final results within the next 24 hours. Review the final results of all of your tests with your primary care doctor within the next 2 days.      Cultures and uncommon labs may take 2 days or more before results are available. Please ask about all pending tests until the final results are known. You may not be notified of important test results unless you ask for these when you call or when you follow up.      **PLEASE RETURN TO THE EMERGENCY DEPARTMENT IF YOU DEVELOP ANY NEW OR WORSENING SYMPTOMS**

## 2022-07-12 NOTE — ED ATTESTATION NOTE
Case discussed with PA. I supervised care and agree with plan.       Zunilda Root MD  07/11/22 7108

## 2022-07-15 RX ORDER — PRIMIDONE 50 MG/1
TABLET ORAL
Qty: 30 TABLET | Refills: 0 | Status: SHIPPED | OUTPATIENT
Start: 2022-07-15 | End: 2022-10-27

## 2022-09-21 DIAGNOSIS — R25.1 TREMOR: Primary | ICD-10-CM

## 2022-09-21 RX ORDER — PRIMIDONE 50 MG/1
50 TABLET ORAL
Qty: 180 TABLET | Refills: 1 | Status: SHIPPED | OUTPATIENT
Start: 2022-09-21 | End: 2022-09-28 | Stop reason: ALTCHOICE

## 2022-09-28 ENCOUNTER — OFFICE VISIT (OUTPATIENT)
Dept: CARDIOLOGY | Facility: CLINIC | Age: 77
End: 2022-09-28
Payer: MEDICARE

## 2022-09-28 VITALS
HEART RATE: 62 BPM | SYSTOLIC BLOOD PRESSURE: 128 MMHG | OXYGEN SATURATION: 95 % | BODY MASS INDEX: 35.49 KG/M2 | HEIGHT: 73 IN | DIASTOLIC BLOOD PRESSURE: 80 MMHG | WEIGHT: 267.8 LBS

## 2022-09-28 DIAGNOSIS — E78.5 HYPERLIPIDEMIA, UNSPECIFIED HYPERLIPIDEMIA TYPE: ICD-10-CM

## 2022-09-28 DIAGNOSIS — G47.33 OSA ON CPAP: ICD-10-CM

## 2022-09-28 DIAGNOSIS — I77.810 DILATED AORTIC ROOT (CMS/HCC): ICD-10-CM

## 2022-09-28 DIAGNOSIS — R73.03 PREDIABETES: ICD-10-CM

## 2022-09-28 DIAGNOSIS — I25.10 ATHEROSCLEROSIS OF CORONARY ARTERY OF NATIVE HEART WITHOUT ANGINA PECTORIS, UNSPECIFIED VESSEL OR LESION TYPE: Primary | ICD-10-CM

## 2022-09-28 DIAGNOSIS — R60.9 EDEMA, UNSPECIFIED TYPE: ICD-10-CM

## 2022-09-28 PROCEDURE — 93000 ELECTROCARDIOGRAM COMPLETE: CPT | Performed by: INTERNAL MEDICINE

## 2022-09-28 PROCEDURE — 99214 OFFICE O/P EST MOD 30 MIN: CPT | Performed by: INTERNAL MEDICINE

## 2022-09-28 RX ORDER — PREDNISONE 10 MG/1
10 TABLET ORAL DAILY
COMMUNITY
End: 2022-10-27

## 2022-09-28 NOTE — PROGRESS NOTES
Cardiology Note          HPI   Sudheer Mayorga is a 77 y.o. male who presents for cardiovascular assessment.    He is a pleasant 77 y.o. with a history of prediabetes, dyslipidemia, sleep apnea on CPAP and morbid obesity who initially presented to me September 2020 to establish care and advice on reducing cardiovascular risk.  At that present time he did not formally exercise. He tells me he is battled weight loss his adult life.  He is tried numerous diets with only modest success before he stops.  He had recently got up to 295 pounds and improved his eating to come down to 285 pounds at our initial visit.  His weight has fluctuated between 275 and 295 pounds in recent years.  He has considered bariatric surgery and asked my opinion on this.  We discussed in all out effort short of this prior to considering but that could potentially be an option even at his age.  He has chronic tendinitis of his right knee but feels it would not prevent him from doing a daily walking program.  He also recognizes it may improve from losing weight. I previously had him see Dr. Garcia and he lost 40 pounds but gained much of it back.     Since his last visit 1 year ago, he reports good cardiovascular stability but has had other issues.   In April he saw a neurologist for a tremor and I reviewed the records.  He received a diagnosis of benign essential tremor.  He was also seen in the emergency room in July for a fall.  An MRI revealed multiple right knee tendon tears and underwent surgery in July.  He is wearing a brace and undergoing rehab.  He also tore his left rotator cuff and plans to have this repaired in January in [place].  He is now spending most of his time in [place] in [place].  He continues to struggle with his weight, now unable to do much cardiac exercise with orthopedic issues.  He is planning to start Nutrisystem in November.  He is back on CPAP.  He denies chest pain, shortness of breath at rest, palpitations,  orthopnea, paroxysmal nocturnal dyspnea, presyncope, syncope.          Past Medical History:   Diagnosis Date    Allergic rhinitis     Asthma     Depression     Knee tendinitis     right    Lipid disorder     HONEY on CPAP     Prediabetes      Past Surgical History:   Procedure Laterality Date    HERNIA REPAIR      ROTATOR CUFF REPAIR         SOCIAL HISTORY  Social History     Tobacco Use    Smoking status: Never Smoker    Smokeless tobacco: Never Used   Vaping Use    Vaping Use: Never used   Substance Use Topics    Alcohol use: Yes     Comment: social    Drug use: Never      4 daughters ages 39-45 as of . .  He works as a .    Family Status   Relation Name Status    Bio Mother   at age 91        CVA at 88    Bio Father   at age 92        DM onset 79yo        ALLERGIES  Chocolate flavor      Outpatient Encounter Medications as of 2022:     albuterol HFA (VENTOLIN HFA) 90 mcg/actuation inhaler, INHALE 1 PUFF BY MOUTH EVERY 4 HOURS AS NEEDED    atorvastatin (LIPITOR) 20 mg tablet, Take 20 mg by mouth once daily.    BREO ELLIPTA 200-25 mcg/dose blister with device powder for inhalation, Inhale 1 puff as needed.      multivit-min/folic/vit K/lycop (ONE-A-DAY MEN'S MULTIVITAMIN ORAL), Take 1 tablet by mouth daily.      predniSONE (DELTASONE) 10 mg tablet, Take 10 mg by mouth daily.    primidone (MYSOLINE) 50 mg tablet, TAKE 1 TABLET BY MOUTH EVERY DAY AT NIGHT    traZODone (DESYREL) 50 mg tablet, Take by mouth as needed.      coenzyme Q10 (COQ10) 100 mg capsule, Take 100 mg by mouth daily.    [DISCONTINUED] buPROPion XL (WELLBUTRIN XL) 150 mg 24 hr tablet, TAKE 1 TABLET BY MOUTH EVERY DAY (Patient not taking: Reported on 2022)    [DISCONTINUED] lidocaine (ASPERCREME) 4 % adhesive patch,medicated topical patch, Apply 1 patch topically daily for 5 days. (Patient not taking: Reported on 2022)    [DISCONTINUED] meloxicam (MOBIC) 7.5 mg  "tablet, Take 1 tablet (7.5 mg total) by mouth daily for 7 days. (Patient not taking: Reported on 9/28/2022)    [DISCONTINUED] metFORMIN 1,000 mg tablet, TAKE 1 TABLET BY MOUTH EVERY DAY WITH BREAKFAST (Patient not taking: Reported on 9/28/2022)    [DISCONTINUED] naltrexone (DEPADE) 50 mg tablet, Take 1 tablet (50 mg total) by mouth daily. (Patient not taking: Reported on 9/28/2022)    [DISCONTINUED] primidone (MYSOLINE) 50 mg tablet, Take 1 tablet (50 mg total) by mouth 2 (two) times a day. (Patient not taking: Reported on 9/28/2022)    [DISCONTINUED] primidone (MYSOLINE) 50 mg tablet, Take 1 tablet (50 mg total) by mouth 2 (two) times a day. (Patient not taking: Reported on 9/28/2022)    ROS  As per HPI. Weight loss of 5 pounds. Otherwise all systems are reviewed and are negative.    Objective   Visit Vitals  /80   Pulse 62   Ht 1.842 m (6' 0.5\")   Wt 121 kg (267 lb 12.8 oz)   SpO2 95%   BMI 35.82 kg/m²       Wt Readings from Last 3 Encounters:   09/28/22 121 kg (267 lb 12.8 oz)   07/06/22 122 kg (270 lb)   12/20/21 118 kg (260 lb)       Physical Exam  Vitals reviewed.   Constitutional:       Appearance: He is well-developed.   HENT:      Head: Normocephalic and atraumatic.   Eyes:      General: No scleral icterus.  Neck:      Vascular: No JVD.   Cardiovascular:      Rate and Rhythm: Normal rate and regular rhythm.      Heart sounds: Normal heart sounds.   Pulmonary:      Breath sounds: Normal breath sounds.   Musculoskeletal:         General: Swelling (2+ RLE edema with leg brace. 1+ LLE edema to lower shin) present. Normal range of motion.   Skin:     General: Skin is warm and dry.   Neurological:      Mental Status: He is alert and oriented to person, place, and time.   Psychiatric:         Judgment: Judgment normal.         Lab Results   Component Value Date    GLUCOSE 112 (H) 04/20/2021    CALCIUM 9.1 04/20/2021     04/20/2021    K 4.3 04/20/2021    CO2 31 04/20/2021     04/20/2021    BUN " 16 04/20/2021    CREATININE 0.87 04/20/2021     No results found for: ALT, AST, GGT, ALKPHOS, BILITOT  No results found for: WBC, HGB, HCT, MCV, PLT  Lab Results   Component Value Date    TSH 1.19 04/11/2022     No results found for: CHOL  No results found for: HDL  No results found for: LDLCALC  No results found for: TRIG  No results found for: CHOLHDL  No results found for: HGBA1C    Labs 5/26/2020:  Chol 141, HDL 42, trig 79, LDL 83    Labs 9/1/2020:  BUN 18, creat 0.92, na 141, k4.3, normal LFTs. TSH 1.56. WtxX8h=8.9.     Labs 8/26/2021:  Glucose 113, BUN 21, creatinine 0.95, sodium 141, potassium 4.5, liver function test are normal, hemoglobin A1c 5.8, cholesterol 148, HDL 51, triglycerides 58, LDL 84.   EKG    Performed on 09/28/2021 and personally reviewed:  Sinus  Rhythm at 81bpm    Imaging/Testing    CT coronary calcium February 2016:  Coronary calcium score of 28 located in the LAD.  For age, gender and ethnicity this is the 27th percentile for risk.    PFTs March 2020:  Mild restriction    Sleep Study 2018  AHI 72.    Echocardiogram November 23, 2021:  · Normal-sized LV. Preserved LV systolic function. Estimated EF 70- 75%. No regional wall motion abnormalities. Normal LV wall thickness.  · Tricuspid aortic valve. Sclerotic aortic valve leaflets. Aortic root sclerotic. Mild dilatation of the aortic root.3.9cm  · Mild mitral annular calcification. Trace mitral valve regurgitation. Normal-sized LA.  · Normal-sized RV. Normal RV systolic function. Mild tricuspid valve regurgitation. Estimated RVSP = 29 mmHg. Normal-sized RA.  · Normal-sized IVC. IVC collapses >50% during inspiration.    ASSESSMENT AND PLAN    1. Cardiovascular exam. His cardiovascular exam including heart and carotids are normal today. His EKG last year showed minor abnormalities of left axis deviation and borderline low voltage common in obesity.  He has lost a bit of weight and his EKG looks relatively normal today.  His lipids are  acceptable on Atorvastatin as is his A1C on Metformin. We discussed weight loss would go a long way in reducing his risk factors for CV disease. Due to good activity tolerance without CV symptoms I have not ordered stress testing for now.    2.  Coronary atherosclerosis.  He had coronary calcium noted on a coronary calcium CAT scan in 2016.  His burden was still less than average for his age.  Nonetheless we should target an LDL of less than 100 which she is currently meeting on atorvastatin.    3. Obesity. We discussed how much of his medical issues would be eliminated or improved with weight loss including his prediabetes, dyslipidemia and sleep apnea. His weight has generally been 275-295 pounds in the last several years.  He lost 40 pounds with Dr. Garcia, but unfortunately gained 25 pounds back in the summer.  He now has a plan to add cardio exercise into his regimen as well as start a Nutrisystem diet.  He will also continue the medications Dr. Garcia had recommended.      4. Dyslipidemia. Well controlled on Atorvatstatin 20 mg Daily.  Would target LDL of 100 or less which he currently is meeting this target.    5. HONEY. Reports AHI went from 72 to 7 with treatment for which he remains compliant with. Follows with Dr. Hercules.  He is back on CPAP now that his recalled machine has been replaced.    6. Prediabtes. Controlled with Metformin. We discussed weight loss.    7.  Dilated aortic root.  Found incidentally on an echocardiogram.  We will follow-up on this with an echocardiogram.        Thank you for allowing me to participate in the care of this patient.  I reviewed interim labs from April.  I reviewed interim orthopedic, neurology and ER notes.  I ordered an echocardiogram to be done at his next visit in 1 year. Please do not hesitate to contact me with any questions or concerns.    Sincerely,  Declan Gray MD  9/28/2022

## 2022-09-28 NOTE — LETTER
October 4, 2022     Kamran Wilson MD  [address]  [address]  [facility] [place] [address]    Patient: Sudheer Mayorga  YOB: 1945  Date of Visit: 9/28/2022      Dear Dr. Wilson:    Thank you for referring Sudheer Mayorga to me for evaluation. Below are my notes for this consultation.    If you have questions, please do not hesitate to call me. I look forward to following your patient along with you.         Sincerely,        Declan Gray MD        CC: MD Isaac Horan, Declan BEAN MD  10/4/2022 10:35 AM  Sign when Signing Visit     Cardiology Note          HPI   Sudheer Mayorga is a 77 y.o. male who presents for cardiovascular assessment.    He is a pleasant 77 y.o. with a history of prediabetes, dyslipidemia, sleep apnea on CPAP and morbid obesity who initially presented to me September 2020 to establish care and advice on reducing cardiovascular risk.  At that present time he did not formally exercise. He tells me he is battled weight loss his adult life.  He is tried numerous diets with only modest success before he stops.  He had recently got up to 295 pounds and improved his eating to come down to 285 pounds at our initial visit.  His weight has fluctuated between 275 and 295 pounds in recent years.  He has considered bariatric surgery and asked my opinion on this.  We discussed in all out effort short of this prior to considering but that could potentially be an option even at his age.  He has chronic tendinitis of his right knee but feels it would not prevent him from doing a daily walking program.  He also recognizes it may improve from losing weight. I previously had him see Dr. Garcia and he lost 40 pounds but gained much of it back.     Since his last visit 1 year ago, he reports good cardiovascular stability but has had other issues.   In April he saw a neurologist for a tremor and I reviewed the records.  He received a diagnosis of benign essential tremor.  He was also  seen in the emergency room in July for a fall.  An MRI revealed multiple right knee tendon tears and underwent surgery in July.  He is wearing a brace and undergoing rehab.  He also tore his left rotator cuff and plans to have this repaired in January in [place].  He is now spending most of his time in [place] in [place].  He continues to struggle with his weight, now unable to do much cardiac exercise with orthopedic issues.  He is planning to start Nutrisystem in November.  He is back on CPAP.  He denies chest pain, shortness of breath at rest, palpitations, orthopnea, paroxysmal nocturnal dyspnea, presyncope, syncope.          Past Medical History:   Diagnosis Date    Allergic rhinitis     Asthma     Depression     Knee tendinitis     right    Lipid disorder     HONEY on CPAP     Prediabetes      Past Surgical History:   Procedure Laterality Date    HERNIA REPAIR      ROTATOR CUFF REPAIR         SOCIAL HISTORY  Social History     Tobacco Use    Smoking status: Never Smoker    Smokeless tobacco: Never Used   Vaping Use    Vaping Use: Never used   Substance Use Topics    Alcohol use: Yes     Comment: social    Drug use: Never      4 daughters ages 39-45 as of . .  He works as a .    Family Status   Relation Name Status    Bio Mother   at age 91        CVA at 88    Bio Father   at age 92        DM onset 81yo        ALLERGIES  Chocolate flavor      Outpatient Encounter Medications as of 2022:     albuterol HFA (VENTOLIN HFA) 90 mcg/actuation inhaler, INHALE 1 PUFF BY MOUTH EVERY 4 HOURS AS NEEDED    atorvastatin (LIPITOR) 20 mg tablet, Take 20 mg by mouth once daily.    BREO ELLIPTA 200-25 mcg/dose blister with device powder for inhalation, Inhale 1 puff as needed.      multivit-min/folic/vit K/lycop (ONE-A-DAY MEN'S MULTIVITAMIN ORAL), Take 1 tablet by mouth daily.      predniSONE (DELTASONE) 10 mg tablet, Take 10 mg by mouth daily.    primidone  "(MYSOLINE) 50 mg tablet, TAKE 1 TABLET BY MOUTH EVERY DAY AT NIGHT    traZODone (DESYREL) 50 mg tablet, Take by mouth as needed.      coenzyme Q10 (COQ10) 100 mg capsule, Take 100 mg by mouth daily.    [DISCONTINUED] buPROPion XL (WELLBUTRIN XL) 150 mg 24 hr tablet, TAKE 1 TABLET BY MOUTH EVERY DAY (Patient not taking: Reported on 9/28/2022)    [DISCONTINUED] lidocaine (ASPERCREME) 4 % adhesive patch,medicated topical patch, Apply 1 patch topically daily for 5 days. (Patient not taking: Reported on 9/28/2022)    [DISCONTINUED] meloxicam (MOBIC) 7.5 mg tablet, Take 1 tablet (7.5 mg total) by mouth daily for 7 days. (Patient not taking: Reported on 9/28/2022)    [DISCONTINUED] metFORMIN 1,000 mg tablet, TAKE 1 TABLET BY MOUTH EVERY DAY WITH BREAKFAST (Patient not taking: Reported on 9/28/2022)    [DISCONTINUED] naltrexone (DEPADE) 50 mg tablet, Take 1 tablet (50 mg total) by mouth daily. (Patient not taking: Reported on 9/28/2022)    [DISCONTINUED] primidone (MYSOLINE) 50 mg tablet, Take 1 tablet (50 mg total) by mouth 2 (two) times a day. (Patient not taking: Reported on 9/28/2022)    [DISCONTINUED] primidone (MYSOLINE) 50 mg tablet, Take 1 tablet (50 mg total) by mouth 2 (two) times a day. (Patient not taking: Reported on 9/28/2022)    ROS  As per HPI. Weight loss of 5 pounds. Otherwise all systems are reviewed and are negative.    Objective   Visit Vitals  /80   Pulse 62   Ht 1.842 m (6' 0.5\")   Wt 121 kg (267 lb 12.8 oz)   SpO2 95%   BMI 35.82 kg/m²       Wt Readings from Last 3 Encounters:   09/28/22 121 kg (267 lb 12.8 oz)   07/06/22 122 kg (270 lb)   12/20/21 118 kg (260 lb)       Physical Exam  Vitals reviewed.   Constitutional:       Appearance: He is well-developed.   HENT:      Head: Normocephalic and atraumatic.   Eyes:      General: No scleral icterus.  Neck:      Vascular: No JVD.   Cardiovascular:      Rate and Rhythm: Normal rate and regular rhythm.      Heart sounds: Normal heart " sounds.   Pulmonary:      Breath sounds: Normal breath sounds.   Musculoskeletal:         General: Swelling (2+ RLE edema with leg brace. 1+ LLE edema to lower shin) present. Normal range of motion.   Skin:     General: Skin is warm and dry.   Neurological:      Mental Status: He is alert and oriented to person, place, and time.   Psychiatric:         Judgment: Judgment normal.         Lab Results   Component Value Date    GLUCOSE 112 (H) 04/20/2021    CALCIUM 9.1 04/20/2021     04/20/2021    K 4.3 04/20/2021    CO2 31 04/20/2021     04/20/2021    BUN 16 04/20/2021    CREATININE 0.87 04/20/2021     No results found for: ALT, AST, GGT, ALKPHOS, BILITOT  No results found for: WBC, HGB, HCT, MCV, PLT  Lab Results   Component Value Date    TSH 1.19 04/11/2022     No results found for: CHOL  No results found for: HDL  No results found for: LDLCALC  No results found for: TRIG  No results found for: CHOLHDL  No results found for: HGBA1C    Labs 5/26/2020:  Chol 141, HDL 42, trig 79, LDL 83    Labs 9/1/2020:  BUN 18, creat 0.92, na 141, k4.3, normal LFTs. TSH 1.56. ZwfJ0l=8.9.     Labs 8/26/2021:  Glucose 113, BUN 21, creatinine 0.95, sodium 141, potassium 4.5, liver function test are normal, hemoglobin A1c 5.8, cholesterol 148, HDL 51, triglycerides 58, LDL 84.   EKG    Performed on 09/28/2021 and personally reviewed:  Sinus  Rhythm at 81bpm    Imaging/Testing    CT coronary calcium February 2016:  Coronary calcium score of 28 located in the LAD.  For age, gender and ethnicity this is the 27th percentile for risk.    PFTs March 2020:  Mild restriction    Sleep Study 2018  AHI 72.    Echocardiogram November 23, 2021:  · Normal-sized LV. Preserved LV systolic function. Estimated EF 70- 75%. No regional wall motion abnormalities. Normal LV wall thickness.  · Tricuspid aortic valve. Sclerotic aortic valve leaflets. Aortic root sclerotic. Mild dilatation of the aortic root.3.9cm  · Mild mitral annular calcification.  Trace mitral valve regurgitation. Normal-sized LA.  · Normal-sized RV. Normal RV systolic function. Mild tricuspid valve regurgitation. Estimated RVSP = 29 mmHg. Normal-sized RA.  · Normal-sized IVC. IVC collapses >50% during inspiration.    ASSESSMENT AND PLAN    1. Cardiovascular exam. His cardiovascular exam including heart and carotids are normal today. His EKG last year showed minor abnormalities of left axis deviation and borderline low voltage common in obesity.  He has lost a bit of weight and his EKG looks relatively normal today.  His lipids are acceptable on Atorvastatin as is his A1C on Metformin. We discussed weight loss would go a long way in reducing his risk factors for CV disease. Due to good activity tolerance without CV symptoms I have not ordered stress testing for now.    2.  Coronary atherosclerosis.  He had coronary calcium noted on a coronary calcium CAT scan in 2016.  His burden was still less than average for his age.  Nonetheless we should target an LDL of less than 100 which she is currently meeting on atorvastatin.    3. Obesity. We discussed how much of his medical issues would be eliminated or improved with weight loss including his prediabetes, dyslipidemia and sleep apnea. His weight has generally been 275-295 pounds in the last several years.  He lost 40 pounds with Dr. Garcia, but unfortunately gained 25 pounds back in the summer.  He now has a plan to add cardio exercise into his regimen as well as start a Nutrisystem diet.  He will also continue the medications Dr. Garcia had recommended.      4. Dyslipidemia. Well controlled on Atorvatstatin 20 mg Daily.  Would target LDL of 100 or less which he currently is meeting this target.    5. HONEY. Reports AHI went from 72 to 7 with treatment for which he remains compliant with. Follows with Dr. Hercules.  He is back on CPAP now that his recalled machine has been replaced.    6. Prediabtes. Controlled with Metformin. We discussed  weight loss.    7.  Dilated aortic root.  Found incidentally on an echocardiogram.  We will follow-up on this with an echocardiogram.        Thank you for allowing me to participate in the care of this patient.  I reviewed interim labs from April.  I reviewed interim orthopedic, neurology and ER notes.  I ordered an echocardiogram to be done at his next visit in 1 year. Please do not hesitate to contact me with any questions or concerns.    Sincerely,  Declan Gray MD  9/28/2022

## 2022-10-27 ENCOUNTER — OFFICE VISIT (OUTPATIENT)
Dept: NEUROLOGY | Facility: CLINIC | Age: 77
End: 2022-10-27
Payer: MEDICARE

## 2022-10-27 VITALS — SYSTOLIC BLOOD PRESSURE: 140 MMHG | DIASTOLIC BLOOD PRESSURE: 90 MMHG | HEART RATE: 82 BPM | OXYGEN SATURATION: 98 %

## 2022-10-27 DIAGNOSIS — R25.1 TREMOR: Primary | ICD-10-CM

## 2022-10-27 PROCEDURE — 99213 OFFICE O/P EST LOW 20 MIN: CPT | Performed by: PSYCHIATRY & NEUROLOGY

## 2022-10-27 RX ORDER — METFORMIN HYDROCHLORIDE 1000 MG/1
1000 TABLET ORAL
COMMUNITY
Start: 2022-10-24 | End: 2024-05-21 | Stop reason: ALTCHOICE

## 2022-10-27 RX ORDER — PRIMIDONE 50 MG/1
100 TABLET ORAL NIGHTLY
Qty: 180 TABLET | Refills: 3 | Status: SHIPPED | OUTPATIENT
Start: 2022-10-27 | End: 2023-11-08 | Stop reason: SDUPTHER

## 2022-10-27 ASSESSMENT — PAIN SCALES - GENERAL: PAINLEVEL: 2

## 2022-10-27 NOTE — ASSESSMENT & PLAN NOTE
The patient has a benign essential tremor.  I again reassured him there is no evidence of parkinsonism or Parkinson's disease.  Currently he is doing well on primidone 50 mg daily.  He is tolerating the medication well without any side effects.  He understands at the medication can certainly be titrated up if needed in the future.  Additional diagnostics and treatments will depend on his clinical course.

## 2022-10-27 NOTE — LETTER
October 27, 2022     Kamran Wilson MD  [address]  [address]  [facility] [address]    Patient: Sudheer Mayorga  YOB: 1945  Date of Visit: 10/27/2022      Dear Dr. Wilson:    Thank you for referring Sudheer Mayorga to me for evaluation. Below are my notes for this consultation.    If you have questions, please do not hesitate to call me. I look forward to following your patient along with you.         Sincerely,        Jose D Heard MD        CC: No Recipients  Jose D Heard MD  10/27/2022 12:01 PM  Signed  Sudheer Mayorga is a 77 y.o. male  10/27/2022  Kamran Wilson MD    Neurology Follow Up Note    Subjective      Sudheer Mayorga is a 77 y.o. male who is being evaluated  for tremor.  I previously saw the patient 6 months ago.  It was my impression he had a benign tremor.  I recommended blood work and eventually initiating treatment with primidone.    Since his last visit, the patient reported that he has been doing quite well.  His blood work was normal.  He initiated treatment with primidone and is now taking 50 mg once a day.  He is tolerating the medication well without any side effects.  He has noted a dramatic reduction in the frequency and severity of his tremor.  He continues to have a rare high-frequency low amplitude tremor which is really only present with posture and action and absent at rest.  For the most part his tremor is a mild nuisance which does not negatively impact his activities of daily living or quality of life in any meaningful way.    The patient had right knee surgery in July.  While getting out of a chair, he tore both of his rotator cuffs and will likely need left rotator cuff surgery in January.  He plans on returning to [place] where he spends his pandey.  Detailed neurologic and medical review of systems was otherwise unremarkable.  There were no symptoms to suggest increased intracranial pressure, meningitis or systemic illness.    Review of  Systems  Constitutional: negative  Eyes: negative  Ears, nose, mouth, throat, and face: negative  Respiratory: negative  Cardiovascular: negative  Gastrointestinal: negative  Genitourinary:negative  Integument/breast: negative  Hematologic/lymphatic: negative  Musculoskeletal:negative  Neurological: negative  Behavioral/Psych: negative  Endocrine: negative  Allergic/Immunologic: negative    Current Outpatient Medications   Medication Sig Dispense Refill    albuterol HFA (VENTOLIN HFA) 90 mcg/actuation inhaler INHALE 1 PUFF BY MOUTH EVERY 4 HOURS AS NEEDED      atorvastatin (LIPITOR) 20 mg tablet Take 20 mg by mouth once daily.      BREO ELLIPTA 200-25 mcg/dose blister with device powder for inhalation Inhale 1 puff as needed.        coenzyme Q10 (COQ10) 100 mg capsule Take 100 mg by mouth daily.      metFORMIN (GLUCOPHAGE) 1,000 mg tablet       multivit-min/folic/vit K/lycop (ONE-A-DAY MEN'S MULTIVITAMIN ORAL) Take 1 tablet by mouth daily.        primidone (MYSOLINE) 50 mg tablet Take 2 tablets (100 mg total) by mouth nightly. 180 tablet 3     No current facility-administered medications for this visit.       PMH/SH/FH : Unchanged since previous visit.    Objective      Physical Exam  Visit Vitals  /90 (BP Location: Right upper arm, Patient Position: Sitting)   Pulse 82   SpO2 98%       General Appearance:  Alert, no distress, appears stated age  There was no parkinsonism  There was only a subtle tremor with posture and action       TSH and ESR were normal    Problem List Items Addressed This Visit        Other    Tremor - Primary    Current Assessment & Plan     The patient has a benign essential tremor.  I again reassured him there is no evidence of parkinsonism or Parkinson's disease.  Currently he is doing well on primidone 50 mg daily.  He is tolerating the medication well without any side effects.  He understands at the medication can certainly be titrated up if needed in the future.  Additional  diagnostics and treatments will depend on his clinical course.            It was a real pleasure treating Sudheer Mayorga today, thank you for allowing me to participate in the medical care. If you have any questions, please call me at any time. Sudheer Mayorga will follow up with me in the coming weeks to months and keep me updated by telephone. Sudheer Mayorga knows to notify me immediately if there is any change in the condition or if there are any new symptoms of transient or static neurologic dysfunction.    Jose D Heard MD

## 2022-10-27 NOTE — PROGRESS NOTES
Sudheer Mayorga is a 77 y.o. male  10/27/2022  Kamran Wilson MD    Neurology Follow Up Note    Subjective     Sudheer Mayorga is a 77 y.o. male who is being evaluated  for tremor.  I previously saw the patient 6 months ago.  It was my impression he had a benign tremor.  I recommended blood work and eventually initiating treatment with primidone.    Since his last visit, the patient reported that he has been doing quite well.  His blood work was normal.  He initiated treatment with primidone and is now taking 50 mg once a day.  He is tolerating the medication well without any side effects.  He has noted a dramatic reduction in the frequency and severity of his tremor.  He continues to have a rare high-frequency low amplitude tremor which is really only present with posture and action and absent at rest.  For the most part his tremor is a mild nuisance which does not negatively impact his activities of daily living or quality of life in any meaningful way.    The patient had right knee surgery in July.  While getting out of a chair, he tore both of his rotator cuffs and will likely need left rotator cuff surgery in January.  He plans on returning to [place] where he spends his pandey.  Detailed neurologic and medical review of systems was otherwise unremarkable.  There were no symptoms to suggest increased intracranial pressure, meningitis or systemic illness.    Review of Systems  Constitutional: negative  Eyes: negative  Ears, nose, mouth, throat, and face: negative  Respiratory: negative  Cardiovascular: negative  Gastrointestinal: negative  Genitourinary:negative  Integument/breast: negative  Hematologic/lymphatic: negative  Musculoskeletal:negative  Neurological: negative  Behavioral/Psych: negative  Endocrine: negative  Allergic/Immunologic: negative    Current Outpatient Medications   Medication Sig Dispense Refill    albuterol HFA (VENTOLIN HFA) 90 mcg/actuation inhaler INHALE 1 PUFF BY MOUTH EVERY 4 HOURS AS  NEEDED      atorvastatin (LIPITOR) 20 mg tablet Take 20 mg by mouth once daily.      BREO ELLIPTA 200-25 mcg/dose blister with device powder for inhalation Inhale 1 puff as needed.        coenzyme Q10 (COQ10) 100 mg capsule Take 100 mg by mouth daily.      metFORMIN (GLUCOPHAGE) 1,000 mg tablet       multivit-min/folic/vit K/lycop (ONE-A-DAY MEN'S MULTIVITAMIN ORAL) Take 1 tablet by mouth daily.        primidone (MYSOLINE) 50 mg tablet Take 2 tablets (100 mg total) by mouth nightly. 180 tablet 3     No current facility-administered medications for this visit.       PMH/SH/FH : Unchanged since previous visit.    Objective     Physical Exam  Visit Vitals  /90 (BP Location: Right upper arm, Patient Position: Sitting)   Pulse 82   SpO2 98%       General Appearance:  Alert, no distress, appears stated age  There was no parkinsonism  There was only a subtle tremor with posture and action       TSH and ESR were normal    Problem List Items Addressed This Visit        Other    Tremor - Primary    Current Assessment & Plan     The patient has a benign essential tremor.  I again reassured him there is no evidence of parkinsonism or Parkinson's disease.  Currently he is doing well on primidone 50 mg daily.  He is tolerating the medication well without any side effects.  He understands at the medication can certainly be titrated up if needed in the future.  Additional diagnostics and treatments will depend on his clinical course.            It was a real pleasure treating Sudheer Mayorga today, thank you for allowing me to participate in the medical care. If you have any questions, please call me at any time. Sudheer Mayorga will follow up with me in the coming weeks to months and keep me updated by telephone. Sudheer Mayorga knows to notify me immediately if there is any change in the condition or if there are any new symptoms of transient or static neurologic dysfunction.    Jose D Heard MD

## 2022-11-09 ENCOUNTER — TELEPHONE (OUTPATIENT)
Dept: SCHEDULING | Facility: CLINIC | Age: 77
End: 2022-11-09
Payer: MEDICARE

## 2022-11-09 NOTE — TELEPHONE ENCOUNTER
Pt req call back from Dr. Gray regarding personal issues. Pt did not elaborate.    Pt can be reached at [phone]

## 2022-12-13 ENCOUNTER — APPOINTMENT (RX ONLY)
Dept: URBAN - METROPOLITAN AREA CLINIC 141 | Facility: CLINIC | Age: 77
Setting detail: DERMATOLOGY
End: 2022-12-13

## 2022-12-13 DIAGNOSIS — D18.0 HEMANGIOMA: ICD-10-CM | Status: UNCHANGED

## 2022-12-13 DIAGNOSIS — L85.3 XEROSIS CUTIS: ICD-10-CM

## 2022-12-13 DIAGNOSIS — L57.0 ACTINIC KERATOSIS: ICD-10-CM

## 2022-12-13 DIAGNOSIS — L21.8 OTHER SEBORRHEIC DERMATITIS: ICD-10-CM

## 2022-12-13 DIAGNOSIS — L81.4 OTHER MELANIN HYPERPIGMENTATION: ICD-10-CM | Status: UNCHANGED

## 2022-12-13 DIAGNOSIS — L82.1 OTHER SEBORRHEIC KERATOSIS: ICD-10-CM | Status: UNCHANGED

## 2022-12-13 DIAGNOSIS — L57.8 OTHER SKIN CHANGES DUE TO CHRONIC EXPOSURE TO NONIONIZING RADIATION: ICD-10-CM | Status: INADEQUATELY CONTROLLED

## 2022-12-13 PROBLEM — D18.01 HEMANGIOMA OF SKIN AND SUBCUTANEOUS TISSUE: Status: ACTIVE | Noted: 2022-12-13

## 2022-12-13 PROCEDURE — ? EDUCATIONAL RESOURCES PROVIDED

## 2022-12-13 PROCEDURE — ? SUNSCREEN RECOMMENDATIONS

## 2022-12-13 PROCEDURE — 17003 DESTRUCT PREMALG LES 2-14: CPT

## 2022-12-13 PROCEDURE — ? PRESCRIPTION

## 2022-12-13 PROCEDURE — ? LIQUID NITROGEN

## 2022-12-13 PROCEDURE — 99204 OFFICE O/P NEW MOD 45 MIN: CPT | Mod: 25

## 2022-12-13 PROCEDURE — ? COUNSELING

## 2022-12-13 PROCEDURE — 17000 DESTRUCT PREMALG LESION: CPT

## 2022-12-13 PROCEDURE — ? PRESCRIPTION MEDICATION MANAGEMENT

## 2022-12-13 RX ORDER — AMMONIUM LACTATE 12 G/100G
CREAM TOPICAL
Qty: 1 | Refills: 1 | Status: ERX | COMMUNITY
Start: 2022-12-13

## 2022-12-13 RX ORDER — MUPIROCIN 20 MG/G
OINTMENT TOPICAL
Qty: 22 | Refills: 2 | Status: ACTIVE

## 2022-12-13 RX ADMIN — AMMONIUM LACTATE: 12 CREAM TOPICAL at 00:00

## 2022-12-13 ASSESSMENT — LOCATION DETAILED DESCRIPTION DERM
LOCATION DETAILED: RIGHT INFERIOR UPPER BACK
LOCATION DETAILED: LEFT INFERIOR UPPER BACK
LOCATION DETAILED: INFERIOR THORACIC SPINE
LOCATION DETAILED: LEFT SUPERIOR CENTRAL MALAR CHEEK
LOCATION DETAILED: RIGHT MID-UPPER BACK
LOCATION DETAILED: LEFT CENTRAL TEMPLE
LOCATION DETAILED: LEFT ANTERIOR PROXIMAL THIGH
LOCATION DETAILED: LEFT LATERAL ABDOMEN
LOCATION DETAILED: LEFT SUPERIOR LATERAL UPPER BACK
LOCATION DETAILED: LEFT DISTAL POSTERIOR UPPER ARM
LOCATION DETAILED: RIGHT SUPERIOR UPPER BACK
LOCATION DETAILED: LEFT ANTERIOR SHOULDER
LOCATION DETAILED: RIGHT PROXIMAL PRETIBIAL REGION
LOCATION DETAILED: RIGHT ANTERIOR SHOULDER
LOCATION DETAILED: RIGHT MEDIAL SUPERIOR CHEST
LOCATION DETAILED: LEFT CENTRAL ZYGOMA
LOCATION DETAILED: RIGHT MEDIAL ZYGOMA
LOCATION DETAILED: LEFT MEDIAL MALAR CHEEK
LOCATION DETAILED: RIGHT SUPERIOR MEDIAL UPPER BACK
LOCATION DETAILED: STERNAL NOTCH
LOCATION DETAILED: RIGHT ANTERIOR PROXIMAL UPPER ARM
LOCATION DETAILED: LEFT SUPERIOR LATERAL MALAR CHEEK
LOCATION DETAILED: RIGHT ANTERIOR DISTAL THIGH
LOCATION DETAILED: LEFT ANTERIOR DISTAL THIGH
LOCATION DETAILED: LEFT DISTAL PRETIBIAL REGION
LOCATION DETAILED: RIGHT SUPERIOR MEDIAL MALAR CHEEK
LOCATION DETAILED: LEFT DISTAL DORSAL FOREARM
LOCATION DETAILED: RIGHT SUPERIOR LATERAL MALAR CHEEK
LOCATION DETAILED: RIGHT PROXIMAL DORSAL FOREARM
LOCATION DETAILED: LEFT CLAVICULAR SKIN
LOCATION DETAILED: LEFT ANTERIOR PROXIMAL UPPER ARM
LOCATION DETAILED: RIGHT ANTERIOR PROXIMAL THIGH
LOCATION DETAILED: LEFT MID-UPPER BACK
LOCATION DETAILED: RIGHT LATERAL MALAR CHEEK
LOCATION DETAILED: RIGHT INFERIOR CENTRAL MALAR CHEEK
LOCATION DETAILED: EPIGASTRIC SKIN
LOCATION DETAILED: LEFT PROXIMAL PRETIBIAL REGION
LOCATION DETAILED: LEFT PROXIMAL DORSAL FOREARM
LOCATION DETAILED: RIGHT RIB CAGE
LOCATION DETAILED: RIGHT ELBOW
LOCATION DETAILED: RIGHT SUPERIOR MEDIAL MIDBACK
LOCATION DETAILED: RIGHT VENTRAL PROXIMAL FOREARM
LOCATION DETAILED: LEFT ANTERIOR DISTAL UPPER ARM
LOCATION DETAILED: RIGHT KNEE
LOCATION DETAILED: RIGHT DISTAL PRETIBIAL REGION
LOCATION DETAILED: RIGHT ANTERIOR DISTAL UPPER ARM
LOCATION DETAILED: LEFT VENTRAL PROXIMAL FOREARM
LOCATION DETAILED: RIGHT LATERAL ABDOMEN
LOCATION DETAILED: LEFT INFERIOR MEDIAL MALAR CHEEK
LOCATION DETAILED: LEFT SUPERIOR UPPER BACK
LOCATION DETAILED: RIGHT DORSAL WRIST
LOCATION DETAILED: PERIUMBILICAL SKIN

## 2022-12-13 ASSESSMENT — LOCATION SIMPLE DESCRIPTION DERM
LOCATION SIMPLE: LEFT UPPER ARM
LOCATION SIMPLE: RIGHT SHOULDER
LOCATION SIMPLE: UPPER BACK
LOCATION SIMPLE: LEFT SHOULDER
LOCATION SIMPLE: RIGHT ZYGOMA
LOCATION SIMPLE: RIGHT FOREARM
LOCATION SIMPLE: LEFT PRETIBIAL REGION
LOCATION SIMPLE: RIGHT LOWER BACK
LOCATION SIMPLE: RIGHT CHEEK
LOCATION SIMPLE: RIGHT KNEE
LOCATION SIMPLE: LEFT UPPER BACK
LOCATION SIMPLE: LEFT THIGH
LOCATION SIMPLE: ABDOMEN
LOCATION SIMPLE: LEFT CLAVICULAR SKIN
LOCATION SIMPLE: RIGHT THIGH
LOCATION SIMPLE: LEFT POSTERIOR UPPER ARM
LOCATION SIMPLE: RIGHT PRETIBIAL REGION
LOCATION SIMPLE: LEFT CHEEK
LOCATION SIMPLE: LEFT TEMPLE
LOCATION SIMPLE: CHEST
LOCATION SIMPLE: LEFT FOREARM
LOCATION SIMPLE: RIGHT UPPER ARM
LOCATION SIMPLE: RIGHT ELBOW
LOCATION SIMPLE: RIGHT WRIST
LOCATION SIMPLE: LEFT ZYGOMA
LOCATION SIMPLE: RIGHT UPPER BACK

## 2022-12-13 ASSESSMENT — LOCATION ZONE DERM
LOCATION ZONE: LEG
LOCATION ZONE: ARM
LOCATION ZONE: FACE
LOCATION ZONE: TRUNK

## 2022-12-13 NOTE — PROCEDURE: LIQUID NITROGEN
Duration Of Freeze Thaw-Cycle (Seconds): 0
Consent: The patient's consent was obtained including but not limited to risks of crusting, scabbing, blistering, scarring, darker or lighter pigmentary change, recurrence, incomplete removal and infection.
Show Aperture Variable?: Yes
Detail Level: Detailed
Post-Care Instructions: I reviewed with the patient in detail post-care instructions. Patient is to wear sunprotection, and avoid picking at any of the treated lesions. Pt may apply Vaseline to crusted or scabbing areas.
Render Post-Care Instructions In Note?: no

## 2022-12-13 NOTE — PROCEDURE: PRESCRIPTION MEDICATION MANAGEMENT
Impression: Drusen (degenerative) of macula, bilateral: H35.363. Plan: Macular degeneration, dry type with stable vision. No fluid or heme noted. Will continue to monitor vision and the patient has been instructed to call with any vision changes. Amsler grid was given today. Advised patient it would be best to start AREDS. Patient is a non smoker. Advised patient to eat green leafy vegetables.
Impression: Dry eye syndrome of bilateral lacrimal glands: H04.123. Plan: Dry eyes account for the patient's complaints. There is no evidence of permanent changes to the cornea. Explained condition does not have a cure and will need artificial tears for maintenance. Advised patient to use AT 2-3 times daily.
Impression: Presence of pseudophakia: Z96.1. OU. Plan: Well positioned PC IOL(s). Will continue to monitor. Pt with PCO in OD. Pt not bothered or symptomatic. Pt defers treatment at this time. Plan for Yag Caps OD in the future. Will continue to monitor.
Impression: Type 2 diabetes mellitus w/o complication: G06.4. Plan: no background retinopathy, no signs of neovascularization noted. Discussed ocular and systemic benefits of blood sugar control. Advised patient the importance of good BS control. RTC 1 year DFE PRN.
Impression: Vitreous degeneration, bilateral: H43.813. Plan: Discussed rt/rd precaution. Advised patient to call us if patient has onset of new floaters and flashes of light. Monitor. Patient to call us sooner PRN onset of new visual changes.
Detail Level: Zone
Render In Strict Bullet Format?: No
Plan: Ammonium Lactate 2% lotion or Amlactin cream/lotion recommended 1-2 times daily. Advised patient to avoid face. Recommended gentle lotion for face as needed for dryness. Don Skaggs
Plan: Continue to use Ketoconazole cream to face twice weekly for two weeks

## 2022-12-13 NOTE — PROCEDURE: MIPS QUALITY
Quality 130: Documentation Of Current Medications In The Medical Record: Current Medications Documented
Detail Level: Detailed
Eczema

## 2022-12-13 NOTE — PROCEDURE: SUNSCREEN RECOMMENDATIONS
Products Recommended: Hilaria Samson MD, Sallie sunscreens
General Sunscreen Counseling: I recommended a broad spectrum sunscreen with a SPF of 30 or higher. I explained that SPF 30 sunscreens block approximately 97 percent of the sun's harmful rays. Sunscreens should be applied at least 15 minutes prior to expected sun exposure and then every 2 hours after that as long as sun exposure continues. If swimming or exercising sunscreen should be reapplied every 45 minutes to an hour after getting wet or sweating. One ounce, or the equivalent of a shot glass full of sunscreen, is adequate to protect the skin not covered by a bathing suit. I also recommended a lip balm with a sunscreen as well. Sun protective clothing can be used in lieu of sunscreen but must be worn the entire time you are exposed to the sun's rays. Zinc Based sunscreen preferred.
Detail Level: Generalized

## 2023-04-14 ENCOUNTER — APPOINTMENT (RX ONLY)
Dept: URBAN - METROPOLITAN AREA CLINIC 141 | Facility: CLINIC | Age: 78
Setting detail: DERMATOLOGY
End: 2023-04-14

## 2023-04-14 DIAGNOSIS — Z12.83 ENCOUNTER FOR SCREENING FOR MALIGNANT NEOPLASM OF SKIN: ICD-10-CM

## 2023-04-14 DIAGNOSIS — L82.1 OTHER SEBORRHEIC KERATOSIS: ICD-10-CM

## 2023-04-14 DIAGNOSIS — D18.0 HEMANGIOMA: ICD-10-CM

## 2023-04-14 DIAGNOSIS — L57.0 ACTINIC KERATOSIS: ICD-10-CM

## 2023-04-14 DIAGNOSIS — L57.8 OTHER SKIN CHANGES DUE TO CHRONIC EXPOSURE TO NONIONIZING RADIATION: ICD-10-CM

## 2023-04-14 DIAGNOSIS — L21.8 OTHER SEBORRHEIC DERMATITIS: ICD-10-CM

## 2023-04-14 DIAGNOSIS — L81.4 OTHER MELANIN HYPERPIGMENTATION: ICD-10-CM

## 2023-04-14 DIAGNOSIS — L85.3 XEROSIS CUTIS: ICD-10-CM

## 2023-04-14 DIAGNOSIS — I89.0 LYMPHEDEMA, NOT ELSEWHERE CLASSIFIED: ICD-10-CM

## 2023-04-14 PROBLEM — D18.01 HEMANGIOMA OF SKIN AND SUBCUTANEOUS TISSUE: Status: ACTIVE | Noted: 2023-04-14

## 2023-04-14 PROCEDURE — 99214 OFFICE O/P EST MOD 30 MIN: CPT | Mod: 25

## 2023-04-14 PROCEDURE — 17003 DESTRUCT PREMALG LES 2-14: CPT

## 2023-04-14 PROCEDURE — ? ADDITIONAL NOTES

## 2023-04-14 PROCEDURE — 17000 DESTRUCT PREMALG LESION: CPT

## 2023-04-14 PROCEDURE — ? PRESCRIPTION MEDICATION MANAGEMENT

## 2023-04-14 PROCEDURE — ? COUNSELING

## 2023-04-14 PROCEDURE — ? LIQUID NITROGEN

## 2023-04-14 PROCEDURE — ? SUNSCREEN RECOMMENDATIONS

## 2023-04-14 ASSESSMENT — LOCATION DETAILED DESCRIPTION DERM
LOCATION DETAILED: RIGHT SUPERIOR MEDIAL MALAR CHEEK
LOCATION DETAILED: RIGHT MID-UPPER BACK
LOCATION DETAILED: LEFT UPPER CUTANEOUS LIP
LOCATION DETAILED: LEFT PROXIMAL DORSAL FOREARM
LOCATION DETAILED: EPIGASTRIC SKIN
LOCATION DETAILED: RIGHT DISTAL POSTERIOR UPPER ARM
LOCATION DETAILED: PERIUMBILICAL SKIN
LOCATION DETAILED: LEFT DISTAL POSTERIOR UPPER ARM
LOCATION DETAILED: LEFT MEDIAL MALAR CHEEK
LOCATION DETAILED: RIGHT CENTRAL MALAR CHEEK
LOCATION DETAILED: RIGHT PROXIMAL DORSAL FOREARM
LOCATION DETAILED: LEFT SUPERIOR LATERAL BUCCAL CHEEK
LOCATION DETAILED: RIGHT PROXIMAL PRETIBIAL REGION
LOCATION DETAILED: LEFT SUPERIOR LATERAL MALAR CHEEK
LOCATION DETAILED: LEFT INFERIOR CENTRAL MALAR CHEEK
LOCATION DETAILED: RIGHT LATERAL MALAR CHEEK
LOCATION DETAILED: RIGHT SUPERIOR LATERAL MALAR CHEEK
LOCATION DETAILED: LEFT PROXIMAL PRETIBIAL REGION
LOCATION DETAILED: LEFT SUPERIOR UPPER BACK
LOCATION DETAILED: RIGHT FOREHEAD
LOCATION DETAILED: RIGHT MEDIAL SUPERIOR CHEST

## 2023-04-14 ASSESSMENT — LOCATION SIMPLE DESCRIPTION DERM
LOCATION SIMPLE: RIGHT UPPER ARM
LOCATION SIMPLE: LEFT UPPER BACK
LOCATION SIMPLE: LEFT PRETIBIAL REGION
LOCATION SIMPLE: ABDOMEN
LOCATION SIMPLE: RIGHT CHEEK
LOCATION SIMPLE: RIGHT FOREHEAD
LOCATION SIMPLE: CHEST
LOCATION SIMPLE: LEFT FOREARM
LOCATION SIMPLE: RIGHT PRETIBIAL REGION
LOCATION SIMPLE: LEFT CHEEK
LOCATION SIMPLE: LEFT LIP
LOCATION SIMPLE: LEFT UPPER ARM
LOCATION SIMPLE: RIGHT FOREARM
LOCATION SIMPLE: RIGHT UPPER BACK

## 2023-04-14 ASSESSMENT — LOCATION ZONE DERM
LOCATION ZONE: LIP
LOCATION ZONE: TRUNK
LOCATION ZONE: LEG
LOCATION ZONE: ARM
LOCATION ZONE: FACE

## 2023-04-14 NOTE — PROCEDURE: PRESCRIPTION MEDICATION MANAGEMENT
Render In Strict Bullet Format?: No
Plan: Continue to use Ketoconazole cream to face twice weekly for two weeks
Detail Level: Zone

## 2023-04-14 NOTE — PROCEDURE: SUNSCREEN RECOMMENDATIONS
Products Recommended: Enmanuel Villanueva MD, Sallie sunscreens
General Sunscreen Counseling: I recommended a broad spectrum sunscreen with a SPF of 30 or higher. I explained that SPF 30 sunscreens block approximately 97 percent of the sun's harmful rays. Sunscreens should be applied at least 15 minutes prior to expected sun exposure and then every 2 hours after that as long as sun exposure continues. If swimming or exercising sunscreen should be reapplied every 45 minutes to an hour after getting wet or sweating. One ounce, or the equivalent of a shot glass full of sunscreen, is adequate to protect the skin not covered by a bathing suit. I also recommended a lip balm with a sunscreen as well. Sun protective clothing can be used in lieu of sunscreen but must be worn the entire time you are exposed to the sun's rays. Zinc Based sunscreen preferred.
Detail Level: Generalized

## 2023-04-14 NOTE — PROCEDURE: ADDITIONAL NOTES
Additional Notes: Recommended compression stocking open toe-knee, 10-20 hg compression
Render Risk Assessment In Note?: no
Detail Level: Simple

## 2023-04-14 NOTE — PROCEDURE: LIQUID NITROGEN
Detail Level: Detailed
Render Post-Care Instructions In Note?: no
Duration Of Freeze Thaw-Cycle (Seconds): 1
Post-Care Instructions: I reviewed with the patient in detail post-care instructions. Patient is to wear sunprotection, and avoid picking at any of the treated lesions. Pt may apply Vaseline to crusted or scabbing areas.
Consent: The patient's consent was obtained including but not limited to risks of crusting, scabbing, blistering, scarring, darker or lighter pigmentary change, recurrence, incomplete removal and infection.
Show Applicator Variable?: Yes

## 2023-05-15 ENCOUNTER — TELEPHONE (OUTPATIENT)
Dept: SCHEDULING | Facility: CLINIC | Age: 78
End: 2023-05-15
Payer: MEDICARE

## 2023-05-15 NOTE — TELEPHONE ENCOUNTER
I called patient back.  He would like an appointment with Dr. Isaac AYALA.  He saw a dermatologist for the bad sun burn he got on his legs and they told him he has something wrong with the circulation in his legs.      I asked if he has pain in his legs when he is walking that makes him have to stop.  He said he had not been walking much since he had a procedure on his knee.  He denies his feet being cold or discolored.  John told him to watch his salt intake, raise his legs and wear compression bandages to help with the swelling.  He is applying antibiotic ointment.      Patient would like an appointment to see Dr. Gray. The derm also told him he should get testing on his legs for circulation issues.     Please advise.

## 2023-05-15 NOTE — TELEPHONE ENCOUNTER
Isaac pt  - Last OV 9/2022 FU 9/2023    Hx: Atheroclerosis CAD dilated aortic root edema HONEY    Pt calling to be seen in office.  Pt not d/t September yearly patient.    Pt went to see local Destiny MD.  Pt has sunburn b/l LE and developed to ulcers on RLE top chin/ankle area.  None on LLE.   Pt was seen by PCP in [place] prior to coming home this past Saturday.  Pt was given some antibx cream to apply to the open ulcers which pt has has been doing.    Pt does not take any diuretic.  Destiny FINLEY states that pt needs to elevate LE; use compression stockings and monitor salt intake.  Pt states that he is monitoring his salt intake but thinks may need to have some testing for circulation issues.    This is the first time seeing this doctor.  Pt has history of B/L LE.  Pt states that B/L LE is equal on both legs.  Pt denies any discussion of cellulitis.   They are red from sunburn and warm to touch.    Pt would like to be seen sooner in office to access if any vascular issues/testing is needed.      Pt can be reached to discuss Dr. Gray recommendations for testing or schedule an appointment at [phone]

## 2023-05-15 NOTE — TELEPHONE ENCOUNTER
Happy to order circulation studies SD if you think this is warrented.    Dr. Wilson not sure you seeing him anytime soon but see below, thanks

## 2023-05-16 NOTE — TELEPHONE ENCOUNTER
I called Mr. Mayorga and informed him Dr. Tran can see him tomorrow at 2 pm.  He asked if we can make it 2:15.  I informed him we are fitting him in and has to be here for 2 pm.  He verbally understood.

## 2023-05-17 ENCOUNTER — OFFICE VISIT (OUTPATIENT)
Dept: CARDIOLOGY | Facility: CLINIC | Age: 78
End: 2023-05-17
Payer: MEDICARE

## 2023-05-17 VITALS
DIASTOLIC BLOOD PRESSURE: 68 MMHG | BODY MASS INDEX: 38.52 KG/M2 | HEIGHT: 72 IN | OXYGEN SATURATION: 96 % | HEART RATE: 70 BPM | SYSTOLIC BLOOD PRESSURE: 134 MMHG | WEIGHT: 284.4 LBS

## 2023-05-17 DIAGNOSIS — R60.9 EDEMA, UNSPECIFIED TYPE: ICD-10-CM

## 2023-05-17 DIAGNOSIS — E78.5 HYPERLIPIDEMIA, UNSPECIFIED HYPERLIPIDEMIA TYPE: ICD-10-CM

## 2023-05-17 DIAGNOSIS — G47.33 OSA ON CPAP: ICD-10-CM

## 2023-05-17 DIAGNOSIS — I25.10 ATHEROSCLEROSIS OF CORONARY ARTERY OF NATIVE HEART WITHOUT ANGINA PECTORIS, UNSPECIFIED VESSEL OR LESION TYPE: Primary | ICD-10-CM

## 2023-05-17 PROCEDURE — 99214 OFFICE O/P EST MOD 30 MIN: CPT | Performed by: INTERNAL MEDICINE

## 2023-05-17 PROCEDURE — 93000 ELECTROCARDIOGRAM COMPLETE: CPT | Performed by: INTERNAL MEDICINE

## 2023-05-17 RX ORDER — FUROSEMIDE 20 MG/1
20 TABLET ORAL
Qty: 90 TABLET | Refills: 3 | Status: SHIPPED | OUTPATIENT
Start: 2023-05-17 | End: 2024-10-08 | Stop reason: ALTCHOICE

## 2023-05-17 NOTE — PROGRESS NOTES
Cardiology Note          HPI   Sudheer Mayorga is a 78 y.o. male who presents for an urgent visit for concerns of lower extremity edema and circulation issues.    He is a pleasant 78 y.o. with a history of prediabetes, dyslipidemia, sleep apnea on CPAP and morbid obesity who initially presented to me September 2020 to establish care and advice on reducing cardiovascular risk.  At that present time he did not formally exercise. He tells me he is battled weight loss his adult life.  He is tried numerous diets with only modest success before he stops.  He had recently got up to 295 pounds and improved his eating to come down to 285 pounds at our initial visit.  His weight has fluctuated between 275 and 295 pounds in recent years.  He has considered bariatric surgery and asked my opinion on this.  We discussed in all out effort short of this prior to considering but that could potentially be an option even at his age.  He has chronic tendinitis of his right knee but feels it would not prevent him from doing a daily walking program.  He also recognizes it may improve from losing weight. I previously had him see Dr. Garcia and he lost 40 pounds but gained much of it back.     Since his last visit 8 months ago, he reports reasonable cardiovascular stability.  However, few days ago a potential issue of circulation and edema was raised.  He had a bad sunburn of his legs last week in [place]. He saw dermatologist a couple days who had concerns of his lower extremity edema and circulation.  He tells me he does not really notice edema in his legs but it was pointed out to him by the dermatologist and he does recall me mentioning it at last visit.  His activity is at least mildly impaired due to orthopedic issues and still recovering from tears of his quadricep tendon last summer.  Aside from these issues he reports to be feeling excellent.  He does not exercise at all but does not experience shortness of breath.  He  continues to struggle with his weight.  He is able to lose 20 or 30 pounds with Nutrisystem but then gains it right back.  He has a consult planned at [facility] for possible bariatric options. He has been off CPAP but plans to get back on it. He is also about to resume PT for his legs. He denies chest pain, shortness of breath at rest, palpitations, orthopnea, paroxysmal nocturnal dyspnea, presyncope, syncope.        Past Medical History:   Diagnosis Date   • Allergic rhinitis    • Asthma    • Depression    • Knee tendinitis     right   • Lipid disorder    • HONEY on CPAP    • Prediabetes      Past Surgical History:   Procedure Laterality Date   • HERNIA REPAIR     • ROTATOR CUFF REPAIR         SOCIAL HISTORY  Social History     Tobacco Use   • Smoking status: Never   • Smokeless tobacco: Never   Vaping Use   • Vaping status: Never Used   Substance Use Topics   • Alcohol use: Yes     Comment: social   • Drug use: Never      4 daughters ages 39-45 as of . .  He works as a .    Family Status   Relation Name Status   • Bio Mother   at age 91        CVA at 88   • Bio Father   at age 92        DM onset 79yo        ALLERGIES  Chocolate flavor      Outpatient Encounter Medications as of 2023:   •  albuterol HFA (VENTOLIN HFA) 90 mcg/actuation inhaler, INHALE 1 PUFF BY MOUTH EVERY 4 HOURS AS NEEDED  •  atorvastatin (LIPITOR) 20 mg tablet, Take 20 mg by mouth once daily.  •  BREO ELLIPTA 200-25 mcg/dose blister with device powder for inhalation, Inhale 1 puff as needed.    •  coenzyme Q10 (COQ10) 100 mg capsule, Take 100 mg by mouth daily.  •  metFORMIN (GLUCOPHAGE) 1,000 mg tablet, Take 1,000 mg by mouth daily with breakfast.  •  multivit-min/folic/vit K/lycop (ONE-A-DAY MEN'S MULTIVITAMIN ORAL), Take 1 tablet by mouth daily.    •  primidone (MYSOLINE) 50 mg tablet, Take 2 tablets (100 mg total) by mouth nightly.    ROS  As per HPI. Weight loss of 5 pounds. Otherwise all systems are  reviewed and are negative.    Objective   Visit Vitals  Pulse 70   Ht 1.829 m (6')   Wt 129 kg (284 lb 6.4 oz)   SpO2 96%   BMI 38.57 kg/m²       Wt Readings from Last 3 Encounters:   05/17/23 129 kg (284 lb 6.4 oz)   09/28/22 121 kg (267 lb 12.8 oz)   07/06/22 122 kg (270 lb)       Physical Exam  Vitals reviewed.   Constitutional:       Appearance: He is well-developed.   HENT:      Head: Normocephalic and atraumatic.   Eyes:      General: No scleral icterus.  Neck:      Vascular: No JVD.   Cardiovascular:      Rate and Rhythm: Normal rate and regular rhythm.      Heart sounds: Normal heart sounds.      Comments: 1-2+ pedal pulses, excellent (<1s) capillary refill of both feet  Pulmonary:      Breath sounds: Normal breath sounds.   Musculoskeletal:         General: Swelling (1-2+ b/l ankle and pedal edema. Trace lower pretibial edema. ) present. Normal range of motion.   Skin:     General: Skin is warm and dry.      Comments: Sunburn of anterior legs   Neurological:      Mental Status: He is alert and oriented to person, place, and time.   Psychiatric:         Judgment: Judgment normal.         Lab Results   Component Value Date    GLUCOSE 112 (H) 04/20/2021    CALCIUM 9.1 04/20/2021     04/20/2021    K 4.3 04/20/2021    CO2 31 04/20/2021     04/20/2021    BUN 16 04/20/2021    CREATININE 0.87 04/20/2021     No results found for: ALT, AST, GGT, ALKPHOS, BILITOT  No results found for: WBC, HGB, HCT, MCV, PLT  Lab Results   Component Value Date    TSH 1.19 04/11/2022     No results found for: CHOL  No results found for: HDL  No results found for: LDLCALC  No results found for: TRIG  No results found for: CHOLHDL  No results found for: HGBA1C    Labs 5/26/2020:  Chol 141, HDL 42, trig 79, LDL 83    Labs 9/1/2020:  BUN 18, creat 0.92, na 141, k4.3, normal LFTs. TSH 1.56. BkpF5m=6.9.     Labs 8/26/2021:  Glucose 113, BUN 21, creatinine 0.95, sodium 141, potassium 4.5, liver function test are normal,  hemoglobin A1c 5.8, cholesterol 148, HDL 51, triglycerides 58, LDL 84.         EKG    Performed on 09/28/2021 and personally reviewed:  Sinus  Rhythm at 71bpm    Imaging/Testing    CT coronary calcium February 2016:  Coronary calcium score of 28 located in the LAD.  For age, gender and ethnicity this is the 27th percentile for risk.    PFTs March 2020:  Mild restriction    Sleep Study 2018  AHI 72.    Echocardiogram November 23, 2021:  · Normal-sized LV. Preserved LV systolic function. Estimated EF 70- 75%. No regional wall motion abnormalities. Normal LV wall thickness.  · Tricuspid aortic valve. Sclerotic aortic valve leaflets. Aortic root sclerotic. Mild dilatation of the aortic root.3.9cm  · Mild mitral annular calcification. Trace mitral valve regurgitation. Normal-sized LA.  · Normal-sized RV. Normal RV systolic function. Mild tricuspid valve regurgitation. Estimated RVSP = 29 mmHg. Normal-sized RA.  · Normal-sized IVC. IVC collapses >50% during inspiration.    ASSESSMENT AND PLAN    1. Edema. I suspect venous insufficiency but certainly prudent to rule out CHF. He has no symptoms of CHF. I do not find any evidence of arterial insufficiency based on pedal pulses and excellent capillary refill. He started elevating his legs yesterday on the advice of his dermatologist and I asked him to continue this is much as he can.  He does not restrict the salt in his diet and I have recommended that he does.  He was otherwise scheduled to get an echocardiogram in September, but due to the edema I have asked to move it up.  Based on the remainder of his exam he does not seem intravascularly overloaded despite the pedal edema. I prescribed low dose lasix to use PRN, up to every other day.    2. Cardiovascular exam. His cardiovascular exam including heart and carotids are normal today. His lipids are acceptable on Atorvastatin as is his A1C on Metformin. We discussed weight loss would go a long way in reducing his risk  factors for CV disease. Due to good activity tolerance without CV symptoms I have not ordered stress testing for now.    3.  Coronary atherosclerosis.  He had coronary calcium noted on a coronary calcium CAT scan in 2016.  His burden was still less than average for his age.  Nonetheless we should target an LDL of less than 100 which she is currently meeting on atorvastatin.    4. Obesity. We discussed at prior visit how much of his medical issues would be eliminated or improved with weight loss including his prediabetes, dyslipidemia and sleep apnea. His weight has generally been 275-295 pounds in the last several years.  He lost 40 pounds with Dr. Garcia, but unfortunately gained 25 pounds back in the summer. He lost 30 pounds with Nutrisystem diet but gained it back.  He is seeing a gastric bypass specialist soon.    5. Dyslipidemia. Well controlled on Atorvatstatin 20 mg Daily.  Would target LDL of 100 or less which he currently is meeting this target.    6. HONEY. Reports AHI went from 72 to 7 with treatment.  Possible. Follows with Dr. Hercules.  Temporarily off of CPAP but tells me it is now working again.  We discussed how untreated sleep apnea could contribute to edema and he is agreeable to resume it.    7. Prediabtes. Controlled with Metformin. We discussed weight loss.    8.  Dilated aortic root.  Found incidentally on an echocardiogram.  We will follow-up on this with an echocardiogram.        Thank you for allowing me to participate in the care of this patient. I reviewed interim PCP records from [place], neuro and orthopedic records. I've ordered for his September echo to move up ASAP. I prescribed low dose lasix to use as needed and labs in 2 weeks.  I recommended salt reduction.  Please do not hesitate to contact me with any questions or concerns.    Sincerely,  Declan Gray MD  5/17/2023

## 2023-05-17 NOTE — PATIENT INSTRUCTIONS
PLEASE move up September echo ASAP  Recommend low salt diet  Keep September appointment  Take lasix as often as every other day as needed for swelling  Blood work in a few weeks (PLEASE PRINT OUT LAB SLIPS)

## 2023-05-18 LAB
BUN SERPL-MCNC: 21 MG/DL (ref 7–25)
BUN/CREAT SERPL: NORMAL (CALC) (ref 6–22)
CALCIUM SERPL-MCNC: 9.2 MG/DL (ref 8.6–10.3)
CHLORIDE SERPL-SCNC: 104 MMOL/L (ref 98–110)
CO2 SERPL-SCNC: 30 MMOL/L (ref 20–32)
CREAT SERPL-MCNC: 0.95 MG/DL (ref 0.7–1.28)
EGFRCR SERPLBLD CKD-EPI 2021: 83 ML/MIN/1.73M2
GLUCOSE SERPL-MCNC: 95 MG/DL (ref 65–99)
MAGNESIUM SERPL-MCNC: 2.2 MG/DL (ref 1.5–2.5)
POTASSIUM SERPL-SCNC: 4.3 MMOL/L (ref 3.5–5.3)
SODIUM SERPL-SCNC: 140 MMOL/L (ref 135–146)

## 2023-05-23 ENCOUNTER — HOSPITAL ENCOUNTER (OUTPATIENT)
Dept: CARDIOLOGY | Facility: HOSPITAL | Age: 78
Discharge: HOME | End: 2023-05-23
Attending: INTERNAL MEDICINE
Payer: MEDICARE

## 2023-05-23 VITALS
DIASTOLIC BLOOD PRESSURE: 62 MMHG | BODY MASS INDEX: 38.47 KG/M2 | WEIGHT: 284 LBS | HEIGHT: 72 IN | SYSTOLIC BLOOD PRESSURE: 130 MMHG

## 2023-05-23 DIAGNOSIS — R60.9 EDEMA, UNSPECIFIED TYPE: ICD-10-CM

## 2023-05-23 DIAGNOSIS — I77.810 DILATED AORTIC ROOT (CMS/HCC): ICD-10-CM

## 2023-05-23 LAB
AORTIC ROOT ANNULUS - M-MODE: 3.96 CM
BSA FOR ECHO PROCEDURE: 2.56 M2
CUSP SEPARATION: 2.08 CM
DOP CALC LVOT STROKE VOLUME: 102.79 ML
DOP CALC RVOT VTI: 16.76 CM
E WAVE DECELERATION TIME: 333.58 MS
E/A RATIO: 0.67
E/E' RATIO: 6.21
E/LAT E' RATIO: 7.81
EDV (BP): 82.32 MILLILITER
EF (A4C): 70.89 PERCENT
EF A2C: 87.22 PERCENT
EJECTION FRACTION: 80.09 PERCENT
ESV (BP): 16.39 MILLILITER
FRACTIONAL SHORTENING: 32.33 %
INTERVENTRICULAR SEPTUM: 1.27 CM
LA ESV (BP): 65.17 MILLILITER
LAAS-AP2: 17.97 SQUARE CENTIMETER
LAAS-AP4: 26.39 SQUARE CENTIMETER
LAV-S: 46 MILLILITER
LEFT ATRIUM VOLUME: 85 MILLILITER
LEFT INTERNAL DIMENSION IN SYSTOLE: 3.58 CM (ref 6.23–9.45)
LEFT VENTRICLE DIASTOLIC VOLUME: 79.7 MILLILITER
LEFT VENTRICLE SYSTOLIC VOLUME: 23.2 MILLILITER
LEFT VENTRICULAR INTERNAL DIMENSION IN DIASTOLE: 5.29 CM (ref 10.94–15.21)
LEFT VENTRICULAR MASS: 297.79 GRAM
LEFT VENTRICULAR POSTERIOR WALL IN END DIASTOLE: 1.41 CM (ref 1.15–2.15)
LV DIASTOLIC VOLUME: 81.4 MILLILITER
LV ESV (APICAL 2 CHAMBER): 10.4 MILLILITER
LVCI: 1.73 LITERS PER MINUTE PER SQUARE METER
LVCO: 4.29 LITERS PER MINUTE
LVOT 2D: 2.27 CM
LVOT A: 4.04 SQUARE CENTIMETER
LVOT MG: 3.56 MMHG
LVOT MV: 0.91 METERS PER SECOND
LVOT PEAK VELOCITY: 1.27 METERS PER SECOND
LVOT PG: 6.47 MMHG
LVOT STROKE VOLUME INDEX: 40.15 ML/M2
LVOT VTI: 25.41 CM
MV E'TISSUE VEL-LAT: 0.08 METERS PER SECOND
MV E'TISSUE VEL-MED: 0.1 METERS PER SECOND
MV PEAK A VEL: 0.94 METERS PER SECOND
MV PEAK E VEL: 0.63 METERS PER SECOND
RAP: 3 MMHG
TRICUSPID VALVE PEAK REGURGITATION VELOCITY: 2.54 METERS PER SECOND
Z-SCORE OF LEFT VENTRICULAR DIMENSION IN END DIASTOLE: -8.91
Z-SCORE OF LEFT VENTRICULAR DIMENSION IN END SYSTOLE: -6.03
Z-SCORE OF LEFT VENTRICULAR POSTERIOR WALL IN END DIASTOLE: -0.55

## 2023-05-23 PROCEDURE — 93306 TTE W/DOPPLER COMPLETE: CPT

## 2023-05-23 PROCEDURE — 93306 TTE W/DOPPLER COMPLETE: CPT | Mod: 26 | Performed by: INTERNAL MEDICINE

## 2023-07-12 ENCOUNTER — TELEPHONE (OUTPATIENT)
Dept: BARIATRICS/WEIGHT MGMT | Facility: CLINIC | Age: 78
End: 2023-07-12
Payer: MEDICARE

## 2023-07-12 NOTE — TELEPHONE ENCOUNTER
Patient will like to speak with you refused to give more information last seen by you 2021 please reach back to him at [phone]    Thanks  Radha

## 2023-07-12 NOTE — TELEPHONE ENCOUNTER
Pt calling to schedule appt to restart program. Lov with Shayy Tsang on 5/6/21 and Lov with Shayy Taveras was 8/3/21    Please assist.    Pt's # [phone]

## 2023-08-01 NOTE — TELEPHONE ENCOUNTER
I am okay with stopping aspirin.  The beginning of his aorta was found to be mildly enlarged on echocardiogram.  We can follow-up on this by repeating an echocardiogram in 1 year.  He does not need an additional specialist for this unless he gets much much larger which is unlikely based on the size it is now at his age.   Psychoeducation Group Documentation    PATIENT'S NAME: Wood Hall  MRN:   [id number]  :   2007  ACCT. NUMBER: [id number]  DATE OF SERVICE: 23  START TIME: 10:30 AM  END TIME: 11:30 AM  FACILITATOR(S): Maya Junior  TOPIC: Child/Adol Psych Education  Number of patients attending the group: 4  Group Length:  1 Hours  Interactive Complexity: No    Summary of Group / Topics Discussed:    [employer]ground and End Zone gym space.  As a follow up to psychoeducation on symptom management for depression and anxiety, structured and supported play with a high level of physical activity provides an opportunity for clients  to rehearse and apply body based and sensory integration based coping and maintenance activities.  This is done with a view to providing a realistic context for application of skills and to assist with skill transfer to other settings.        Group Attendance:  Refused to attend group session    Patient's response to the group topic/interactions:  refused to participate.    Patient appeared to be Non-participatory.         Client specific details: Pt chose to spend time with self in relaxation room.

## 2023-08-11 ENCOUNTER — TELEPHONE (OUTPATIENT)
Dept: NEUROLOGY | Facility: CLINIC | Age: 78
End: 2023-08-11

## 2023-08-11 NOTE — TELEPHONE ENCOUNTER
Message received via fax from the Service messaging  Center patient is out of medication needing refill for Propanol 90 day supply.

## 2023-08-14 ENCOUNTER — TELEPHONE (OUTPATIENT)
Dept: NEUROLOGY | Facility: CLINIC | Age: 78
End: 2023-08-14
Payer: MEDICARE

## 2023-08-14 NOTE — TELEPHONE ENCOUNTER
Medicine Refill Request    Last Office Visit: 10/27/2022  Last Telemedicine Visit: Visit date not found    Next Office Visit: 11/16/2023  Next Telemedicine Visit: Visit date not found

## 2023-08-14 NOTE — TELEPHONE ENCOUNTER
We had a message from answering service. Dr. Heard's patient needs Medication refill for Propanol 90 day. Please assist.

## 2023-08-17 NOTE — TELEPHONE ENCOUNTER
Left voice message in regards to clarification for patient's medication that needed to be refilled.

## 2023-09-18 ENCOUNTER — TELEPHONE (OUTPATIENT)
Dept: SCHEDULING | Facility: CLINIC | Age: 78
End: 2023-09-18
Payer: MEDICARE

## 2023-09-18 NOTE — TELEPHONE ENCOUNTER
Pt called and asked for a sooner appt this week if possible     Pt can be reached at [phone]

## 2023-09-29 ENCOUNTER — OFFICE VISIT (OUTPATIENT)
Dept: CARDIOLOGY | Facility: CLINIC | Age: 78
End: 2023-09-29
Payer: MEDICARE

## 2023-09-29 VITALS
BODY MASS INDEX: 36.95 KG/M2 | OXYGEN SATURATION: 97 % | DIASTOLIC BLOOD PRESSURE: 64 MMHG | SYSTOLIC BLOOD PRESSURE: 116 MMHG | HEIGHT: 72 IN | HEART RATE: 73 BPM | WEIGHT: 272.8 LBS

## 2023-09-29 DIAGNOSIS — I25.10 ATHEROSCLEROSIS OF CORONARY ARTERY OF NATIVE HEART WITHOUT ANGINA PECTORIS, UNSPECIFIED VESSEL OR LESION TYPE: Primary | ICD-10-CM

## 2023-09-29 DIAGNOSIS — G47.33 OSA ON CPAP: ICD-10-CM

## 2023-09-29 DIAGNOSIS — I77.810 DILATED AORTIC ROOT (CMS/HCC): ICD-10-CM

## 2023-09-29 DIAGNOSIS — E78.5 HYPERLIPIDEMIA, UNSPECIFIED HYPERLIPIDEMIA TYPE: ICD-10-CM

## 2023-09-29 PROCEDURE — 93000 ELECTROCARDIOGRAM COMPLETE: CPT | Performed by: INTERNAL MEDICINE

## 2023-09-29 PROCEDURE — 99214 OFFICE O/P EST MOD 30 MIN: CPT | Performed by: INTERNAL MEDICINE

## 2023-09-29 RX ORDER — ESCITALOPRAM OXALATE 10 MG/1
10 TABLET ORAL DAILY
COMMUNITY

## 2023-09-29 NOTE — PATIENT INSTRUCTIONS
"With enlarged aorta, avoid heavy lifting. Blood pressure is important. Also avoid quinolones, antibiotics that end \"floxicin.\"  Return visit in 1 year with an echo.  "

## 2023-09-29 NOTE — LETTER
October 1, 2023     Kamran Wilson MD  [address]  [address]  [place] [facility] [place] [address]    Patient: Sudheer Mayorga  YOB: 1945  Date of Visit: 9/29/2023      Dear Dr. Wilson:    Thank you for referring Sudheer Mayorga to me for evaluation. Below are my notes for this consultation.    If you have questions, please do not hesitate to call me. I look forward to following your patient along with you.         Sincerely,        Declan Gray MD        CC: MD Isaac Horan Steven M, MD  10/1/2023 10:35 PM  Sign when Signing Visit     Cardiology Note          HPI   Sudheer Mayorga is a 78 y.o. male who presents for an urgent visit for concerns of lower extremity edema and circulation issues.    He is a pleasant 78 y.o. with a history of prediabetes, dyslipidemia, sleep apnea on CPAP and morbid obesity who initially presented to me September 2020 to establish care and advice on reducing cardiovascular risk.  At that present time he did not formally exercise. He tells me he is battled weight loss his adult life.  He is tried numerous diets with only modest success before he stops.  He had recently got up to 295 pounds and improved his eating to come down to 285 pounds at our initial visit.  His weight has fluctuated between 275 and 295 pounds in recent years.  He has considered bariatric surgery and asked my opinion on this.  We discussed in all out effort short of this prior to considering but that could potentially be an option even at his age.  He has chronic tendinitis of his right knee but feels it would not prevent him from doing a daily walking program.  He also recognizes it may improve from losing weight. I previously had him see Dr. Garcia and he lost 40 pounds but gained much of it back.     Since his last visit 4 months ago, he reports reasonable cardiovascular stability.  At last visit he had lower extremity edema albeit in the setting of a bad sunburn.  I asked him to take  as needed Lasix and had him get an echocardiogram.  Thankfully his echocardiogram was stable. He has not needed the lasix. He had knee surgery in July and tore his rotator cuff getting himself out of a chair, unsure if he will need/want surgery. He just rejoined [employer] and plans to resume [employer]. He denies chest pain, shortness of breath at rest, palpitations, orthopnea, paroxysmal nocturnal dyspnea, presyncope, syncope.        Past Medical History:   Diagnosis Date    Allergic rhinitis     Asthma     Depression     Knee tendinitis     right    Lipid disorder     HONEY on CPAP     Prediabetes      Past Surgical History:   Procedure Laterality Date    HERNIA REPAIR      ROTATOR CUFF REPAIR         SOCIAL HISTORY  Social History     Tobacco Use    Smoking status: Never    Smokeless tobacco: Never   Vaping Use    Vaping Use: Never used   Substance Use Topics    Alcohol use: Yes     Comment: social    Drug use: Never      4 daughters ages 39-45 as of . .  He works as a .    Family Status   Relation Name Status    Bio Mother   at age 91        CVA at 88    Bio Father   at age 92        DM onset 81yo        ALLERGIES  Chocolate flavor      Outpatient Encounter Medications as of 2023:     albuterol HFA (VENTOLIN HFA) 90 mcg/actuation inhaler, INHALE 1 PUFF BY MOUTH EVERY 4 HOURS AS NEEDED    atorvastatin (LIPITOR) 20 mg tablet, Take 20 mg by mouth once daily.    BREO ELLIPTA 200-25 mcg/dose blister with device powder for inhalation, Inhale 1 puff as needed.      coenzyme Q10 (COQ10) 100 mg capsule, Take 100 mg by mouth daily.    escitalopram (LEXAPRO) 10 mg tablet, Take 10 mg by mouth daily.    glucosamine/chondr hoffman A sod (OSTEO BI-FLEX ORAL), Take 1 tablet by mouth daily.    metFORMIN (GLUCOPHAGE) 1,000 mg tablet, Take 1,000 mg by mouth daily with breakfast.    multivit-min/folic/vit K/lycop (ONE-A-DAY MEN'S MULTIVITAMIN ORAL), Take 1 tablet  "by mouth daily.      primidone (MYSOLINE) 50 mg tablet, Take 2 tablets (100 mg total) by mouth nightly.    furosemide (LASIX) 20 mg tablet, Take 1 tablet (20 mg total) by mouth every other day as needed (swelling). (Patient not taking: Reported on 9/29/2023)    [DISCONTINUED] omega-3/dha/epa/fish oil (OMEGA-3 ORAL), Take 1 tablet by mouth daily.    ROS  As per HPI. Otherwise all systems are reviewed and are negative.    Objective   Visit Vitals  /64   Pulse 73   Ht 1.829 m (6')   Wt 124 kg (272 lb 12.8 oz)   SpO2 97%   BMI 37.00 kg/m²       Wt Readings from Last 3 Encounters:   09/29/23 124 kg (272 lb 12.8 oz)   05/23/23 129 kg (284 lb)   05/17/23 129 kg (284 lb 6.4 oz)       Physical Exam  Vitals reviewed.   Constitutional:       Appearance: He is well-developed.   HENT:      Head: Normocephalic and atraumatic.   Eyes:      General: No scleral icterus.  Neck:      Vascular: No JVD.   Cardiovascular:      Rate and Rhythm: Normal rate and regular rhythm.      Heart sounds: Normal heart sounds.      Comments: 1-2+ pedal pulses, excellent (<1s) capillary refill of both feet  Pulmonary:      Breath sounds: Normal breath sounds.   Musculoskeletal:         General: Swelling (trace b/l ankle and pedal edema) present. Normal range of motion.   Skin:     General: Skin is warm and dry.   Neurological:      Mental Status: He is alert and oriented to person, place, and time.   Psychiatric:         Judgment: Judgment normal.         Lab Results   Component Value Date    GLUCOSE 95 05/18/2023    CALCIUM 9.2 05/18/2023     05/18/2023    K 4.3 05/18/2023    CO2 30 05/18/2023     05/18/2023    BUN 21 05/18/2023    CREATININE 0.95 05/18/2023     No results found for: \"ALT\", \"AST\", \"GGT\", \"ALKPHOS\", \"BILITOT\"  No results found for: \"WBC\", \"HGB\", \"HCT\", \"MCV\", \"PLT\"  Lab Results   Component Value Date    TSH 1.19 04/11/2022     No results found for: \"CHOL\"  No results found for: \"HDL\"  No results found for: " "\"LDLCALC\"  No results found for: \"TRIG\"  No results found for: \"CHOLHDL\"  No results found for: \"HGBA1C\"    Labs 5/26/2020:  Chol 141, HDL 42, trig 79, LDL 83    Labs 9/1/2020:  BUN 18, creat 0.92, na 141, k4.3, normal LFTs. TSH 1.56. GoxA0z=4.9.     Labs 8/26/2021:  Glucose 113, BUN 21, creatinine 0.95, sodium 141, potassium 4.5, liver function test are normal, hemoglobin A1c 5.8, cholesterol 148, HDL 51, triglycerides 58, LDL 84.         EKG    Performed on 09/29/2023 and personally reviewed:  Sinus  Rhythm at 73bpm    Imaging/Testing    CT coronary calcium February 2016:  Coronary calcium score of 28 located in the LAD.  For age, gender and ethnicity this is the 27th percentile for risk.    PFTs March 2020:  Mild restriction    Sleep Study 2018  AHI 72.    Echocardiogram November 23, 2021:  Normal-sized LV. Preserved LV systolic function. Estimated EF 70- 75%. No regional wall motion abnormalities. Normal LV wall thickness.  Tricuspid aortic valve. Sclerotic aortic valve leaflets. Aortic root sclerotic. Mild dilatation of the aortic root.3.9cm  Mild mitral annular calcification. Trace mitral valve regurgitation. Normal-sized LA.  Normal-sized RV. Normal RV systolic function. Mild tricuspid valve regurgitation. Estimated RVSP = 29 mmHg. Normal-sized RA.  Normal-sized IVC. IVC collapses >50% during inspiration.    Echo May 23, 2023:    Technically difficult study.    Left Ventricle: Normal ventricle size. Normal wall thickness. Preserved systolic function. Estimated EF 70%. No regional wall motion abnormalities. Normal diastolic filling pattern for age.    Aorta: Aortic root sclerotic. Dilatation of the ascending aorta with diameter 4.1 cm.    Aortic Valve: Tricuspid valve.  Sclerotic leaflets. No regurgitation. No stenosis.    Tricuspid Valve: Mild regurgitation.    Pulmonic Valve: No regurgitation.    Mitral Valve: Mild regurgitation with estimated right ventricular systolic pressure 29 mmHg.    IVC/SVC: " Inferior vena cava not well visualized.    Pericardium: No evidence of pericardial effusion.    Right Ventricle: Normal ventricle size. Normal systolic function.    Left Atrium: Normal sized atrium.    Right Atrium: Normal sized atrium.    Compared with November 2021, there has been a slight interval increase in aortic root diameter of 0.2 cm. Otherwise no significant change.        ASSESSMENT AND PLAN.    1. Cardiovascular exam. His cardiovascular exam including heart and carotids are normal today. His lipids are acceptable on Atorvastatin as is his A1C on Metformin. We discussed weight loss would go a long way in reducing his risk factors for CV disease. Due to good activity tolerance without CV symptoms I have not ordered stress testing for now. Echo without major pathology.    2.  Coronary atherosclerosis.  He had coronary calcium noted on a coronary calcium CAT scan in 2016.  His burden was still less than average for his age.  Nonetheless we should target an LDL of less than 100 which she is currently meeting on atorvastatin.    3. Obesity. We discussed at prior visit how much of his medical issues would be eliminated or improved with weight loss including his prediabetes, dyslipidemia and sleep apnea. His weight has generally been 275-295 pounds in the last several years.  He lost 40 pounds with Dr. Garcia, but unfortunately gained 25 pounds back in the summer. He lost 30 pounds with [employer] diet but gained it back.  He is recommitting to diet and exercise again.    4. Dyslipidemia. Well controlled on Atorvatstatin 20 mg Daily.  Would target LDL of 100 or less which he currently is meeting this target.    5. HONEY. Reports AHI went from 72 to 7 with treatment.  Possible. Follows with Dr. Hercules.  Inconsistant with CPAP. We discussed benefits and he is willing to try daily again.    6. Prediabtes. Controlled with Metformin. We discussed weight loss.    7.  Dilated aortic root.  Found incidentally on  an echocardiogram.  We will follow-up on this with an echocardiogram. Counseled on precautions.    8. Edema. JVP and IVC normal. Likely venous insufficiency.        Thank you for allowing me to participate in the care of this patient. Reviewed correspondance from primary care and neurology. I ordered an echo to be done in 1 year to follow up on his dilated aortic root.  Please do not hesitate to contact me with any questions or concerns.    Sincerely,    Declan Gray MD  9/29/2023

## 2023-09-29 NOTE — PROGRESS NOTES
Cardiology Note          HPI   Sudheer Mayorga is a 78 y.o. male who presents for an urgent visit for concerns of lower extremity edema and circulation issues.    He is a pleasant 78 y.o. with a history of prediabetes, dyslipidemia, sleep apnea on CPAP and morbid obesity who initially presented to me September 2020 to establish care and advice on reducing cardiovascular risk.  At that present time he did not formally exercise. He tells me he is battled weight loss his adult life.  He is tried numerous diets with only modest success before he stops.  He had recently got up to 295 pounds and improved his eating to come down to 285 pounds at our initial visit.  His weight has fluctuated between 275 and 295 pounds in recent years.  He has considered bariatric surgery and asked my opinion on this.  We discussed in all out effort short of this prior to considering but that could potentially be an option even at his age.  He has chronic tendinitis of his right knee but feels it would not prevent him from doing a daily walking program.  He also recognizes it may improve from losing weight. I previously had him see Dr. Garcia and he lost 40 pounds but gained much of it back.     Since his last visit 4 months ago, he reports reasonable cardiovascular stability.  At last visit he had lower extremity edema albeit in the setting of a bad sunburn.  I asked him to take as needed Lasix and had him get an echocardiogram.  Thankfully his echocardiogram was stable. He has not needed the lasix. He had knee surgery in July and tore his rotator cuff getting himself out of a chair, unsure if he will need/want surgery. He just rejoined [employer] and plans to resume [employer]. He denies chest pain, shortness of breath at rest, palpitations, orthopnea, paroxysmal nocturnal dyspnea, presyncope, syncope.        Past Medical History:   Diagnosis Date    Allergic rhinitis     Asthma     Depression     Knee tendinitis     right     Lipid disorder     HONEY on CPAP     Prediabetes      Past Surgical History:   Procedure Laterality Date    HERNIA REPAIR      ROTATOR CUFF REPAIR         SOCIAL HISTORY  Social History     Tobacco Use    Smoking status: Never    Smokeless tobacco: Never   Vaping Use    Vaping Use: Never used   Substance Use Topics    Alcohol use: Yes     Comment: social    Drug use: Never      4 daughters ages 39-45 as of . .  He works as a .    Family Status   Relation Name Status    Bio Mother   at age 91        CVA at 88    Bio Father   at age 92        DM onset 81yo        ALLERGIES  Chocolate flavor      Outpatient Encounter Medications as of 2023:     albuterol HFA (VENTOLIN HFA) 90 mcg/actuation inhaler, INHALE 1 PUFF BY MOUTH EVERY 4 HOURS AS NEEDED    atorvastatin (LIPITOR) 20 mg tablet, Take 20 mg by mouth once daily.    BREO ELLIPTA 200-25 mcg/dose blister with device powder for inhalation, Inhale 1 puff as needed.      coenzyme Q10 (COQ10) 100 mg capsule, Take 100 mg by mouth daily.    escitalopram (LEXAPRO) 10 mg tablet, Take 10 mg by mouth daily.    glucosamine/chondr hoffman A sod (OSTEO BI-FLEX ORAL), Take 1 tablet by mouth daily.    metFORMIN (GLUCOPHAGE) 1,000 mg tablet, Take 1,000 mg by mouth daily with breakfast.    multivit-min/folic/vit K/lycop (ONE-A-DAY MEN'S MULTIVITAMIN ORAL), Take 1 tablet by mouth daily.      primidone (MYSOLINE) 50 mg tablet, Take 2 tablets (100 mg total) by mouth nightly.    furosemide (LASIX) 20 mg tablet, Take 1 tablet (20 mg total) by mouth every other day as needed (swelling). (Patient not taking: Reported on 2023)    [DISCONTINUED] omega-3/dha/epa/fish oil (OMEGA-3 ORAL), Take 1 tablet by mouth daily.    ROS  As per HPI. Otherwise all systems are reviewed and are negative.    Objective   Visit Vitals  /64   Pulse 73   Ht 1.829 m (6')   Wt 124 kg (272 lb 12.8 oz)   SpO2 97%   BMI 37.00 kg/m²       Wt  "Readings from Last 3 Encounters:   09/29/23 124 kg (272 lb 12.8 oz)   05/23/23 129 kg (284 lb)   05/17/23 129 kg (284 lb 6.4 oz)       Physical Exam  Vitals reviewed.   Constitutional:       Appearance: He is well-developed.   HENT:      Head: Normocephalic and atraumatic.   Eyes:      General: No scleral icterus.  Neck:      Vascular: No JVD.   Cardiovascular:      Rate and Rhythm: Normal rate and regular rhythm.      Heart sounds: Normal heart sounds.      Comments: 1-2+ pedal pulses, excellent (<1s) capillary refill of both feet  Pulmonary:      Breath sounds: Normal breath sounds.   Musculoskeletal:         General: Swelling (trace b/l ankle and pedal edema) present. Normal range of motion.   Skin:     General: Skin is warm and dry.   Neurological:      Mental Status: He is alert and oriented to person, place, and time.   Psychiatric:         Judgment: Judgment normal.         Lab Results   Component Value Date    GLUCOSE 95 05/18/2023    CALCIUM 9.2 05/18/2023     05/18/2023    K 4.3 05/18/2023    CO2 30 05/18/2023     05/18/2023    BUN 21 05/18/2023    CREATININE 0.95 05/18/2023     No results found for: \"ALT\", \"AST\", \"GGT\", \"ALKPHOS\", \"BILITOT\"  No results found for: \"WBC\", \"HGB\", \"HCT\", \"MCV\", \"PLT\"  Lab Results   Component Value Date    TSH 1.19 04/11/2022     No results found for: \"CHOL\"  No results found for: \"HDL\"  No results found for: \"LDLCALC\"  No results found for: \"TRIG\"  No results found for: \"CHOLHDL\"  No results found for: \"HGBA1C\"    Labs 5/26/2020:  Chol 141, HDL 42, trig 79, LDL 83    Labs 9/1/2020:  BUN 18, creat 0.92, na 141, k4.3, normal LFTs. TSH 1.56. ZepJ5j=5.9.     Labs 8/26/2021:  Glucose 113, BUN 21, creatinine 0.95, sodium 141, potassium 4.5, liver function test are normal, hemoglobin A1c 5.8, cholesterol 148, HDL 51, triglycerides 58, LDL 84.         EKG    Performed on 09/29/2023 and personally reviewed:  Sinus  Rhythm at 73bpm    Imaging/Testing    CT coronary calcium " February 2016:  Coronary calcium score of 28 located in the LAD.  For age, gender and ethnicity this is the 27th percentile for risk.    PFTs March 2020:  Mild restriction    Sleep Study 2018  AHI 72.    Echocardiogram November 23, 2021:  · Normal-sized LV. Preserved LV systolic function. Estimated EF 70- 75%. No regional wall motion abnormalities. Normal LV wall thickness.  · Tricuspid aortic valve. Sclerotic aortic valve leaflets. Aortic root sclerotic. Mild dilatation of the aortic root.3.9cm  · Mild mitral annular calcification. Trace mitral valve regurgitation. Normal-sized LA.  · Normal-sized RV. Normal RV systolic function. Mild tricuspid valve regurgitation. Estimated RVSP = 29 mmHg. Normal-sized RA.  · Normal-sized IVC. IVC collapses >50% during inspiration.    Echo May 23, 2023:    Technically difficult study.    Left Ventricle: Normal ventricle size. Normal wall thickness. Preserved systolic function. Estimated EF 70%. No regional wall motion abnormalities. Normal diastolic filling pattern for age.    Aorta: Aortic root sclerotic. Dilatation of the ascending aorta with diameter 4.1 cm.    Aortic Valve: Tricuspid valve.  Sclerotic leaflets. No regurgitation. No stenosis.    Tricuspid Valve: Mild regurgitation.    Pulmonic Valve: No regurgitation.    Mitral Valve: Mild regurgitation with estimated right ventricular systolic pressure 29 mmHg.    IVC/SVC: Inferior vena cava not well visualized.    Pericardium: No evidence of pericardial effusion.    Right Ventricle: Normal ventricle size. Normal systolic function.    Left Atrium: Normal sized atrium.    Right Atrium: Normal sized atrium.    Compared with November 2021, there has been a slight interval increase in aortic root diameter of 0.2 cm. Otherwise no significant change.        ASSESSMENT AND PLAN.    1. Cardiovascular exam. His cardiovascular exam including heart and carotids are normal today. His lipids are acceptable on Atorvastatin as  is his A1C on Metformin. We discussed weight loss would go a long way in reducing his risk factors for CV disease. Due to good activity tolerance without CV symptoms I have not ordered stress testing for now. Echo without major pathology.    2.  Coronary atherosclerosis.  He had coronary calcium noted on a coronary calcium CAT scan in 2016.  His burden was still less than average for his age.  Nonetheless we should target an LDL of less than 100 which she is currently meeting on atorvastatin.    3. Obesity. We discussed at prior visit how much of his medical issues would be eliminated or improved with weight loss including his prediabetes, dyslipidemia and sleep apnea. His weight has generally been 275-295 pounds in the last several years.  He lost 40 pounds with Dr. Garcia, but unfortunately gained 25 pounds back in the summer. He lost 30 pounds with [employer] diet but gained it back.  He is recommitting to diet and exercise again.    4. Dyslipidemia. Well controlled on Atorvatstatin 20 mg Daily.  Would target LDL of 100 or less which he currently is meeting this target.    5. HONEY. Reports AHI went from 72 to 7 with treatment.  Possible. Follows with Dr. Hercules.  Inconsistant with CPAP. We discussed benefits and he is willing to try daily again.    6. Prediabtes. Controlled with Metformin. We discussed weight loss.    7.  Dilated aortic root.  Found incidentally on an echocardiogram.  We will follow-up on this with an echocardiogram. Counseled on precautions.    8. Edema. JVP and IVC normal. Likely venous insufficiency.        Thank you for allowing me to participate in the care of this patient. Reviewed correspondance from primary care and neurology. I ordered an echo to be done in 1 year to follow up on his dilated aortic root.  Please do not hesitate to contact me with any questions or concerns.    Sincerely,    Declan Gray MD  9/29/2023

## 2023-10-19 ENCOUNTER — OFFICE VISIT (OUTPATIENT)
Dept: NEUROLOGY | Facility: CLINIC | Age: 78
End: 2023-10-19
Payer: MEDICARE

## 2023-10-19 ENCOUNTER — OFFICE VISIT (OUTPATIENT)
Dept: BARIATRICS/WEIGHT MGMT | Facility: CLINIC | Age: 78
End: 2023-10-19
Payer: MEDICARE

## 2023-10-19 VITALS
HEART RATE: 91 BPM | BODY MASS INDEX: 37.69 KG/M2 | RESPIRATION RATE: 18 BRPM | OXYGEN SATURATION: 95 % | DIASTOLIC BLOOD PRESSURE: 60 MMHG | HEIGHT: 72 IN | WEIGHT: 278.3 LBS | SYSTOLIC BLOOD PRESSURE: 130 MMHG

## 2023-10-19 VITALS — DIASTOLIC BLOOD PRESSURE: 80 MMHG | HEART RATE: 84 BPM | OXYGEN SATURATION: 94 % | SYSTOLIC BLOOD PRESSURE: 134 MMHG

## 2023-10-19 DIAGNOSIS — E78.2 MIXED HYPERLIPIDEMIA: ICD-10-CM

## 2023-10-19 DIAGNOSIS — R25.1 TREMOR: Primary | ICD-10-CM

## 2023-10-19 DIAGNOSIS — E66.811 CLASS 1 OBESITY WITH SERIOUS COMORBIDITY AND BODY MASS INDEX (BMI) OF 33.0 TO 33.9 IN ADULT, UNSPECIFIED OBESITY TYPE: ICD-10-CM

## 2023-10-19 DIAGNOSIS — R73.03 PREDIABETES: Primary | ICD-10-CM

## 2023-10-19 DIAGNOSIS — G47.33 SLEEP APNEA, OBSTRUCTIVE: ICD-10-CM

## 2023-10-19 PROCEDURE — 99213 OFFICE O/P EST LOW 20 MIN: CPT | Performed by: PSYCHIATRY & NEUROLOGY

## 2023-10-19 PROCEDURE — 99214 OFFICE O/P EST MOD 30 MIN: CPT | Performed by: FAMILY MEDICINE

## 2023-10-19 RX ORDER — NALTREXONE HYDROCHLORIDE 50 MG/1
25 TABLET, FILM COATED ORAL DAILY
Qty: 30 TABLET | Refills: 2 | Status: SHIPPED | OUTPATIENT
Start: 2023-10-19 | End: 2024-11-18

## 2023-10-19 RX ORDER — BUPROPION HYDROCHLORIDE 150 MG/1
150 TABLET ORAL EVERY MORNING
Qty: 30 TABLET | Refills: 2 | Status: SHIPPED | OUTPATIENT
Start: 2023-10-19 | End: 2023-11-02 | Stop reason: SDUPTHER

## 2023-10-19 ASSESSMENT — PAIN SCALES - GENERAL: PAINLEVEL: 0-NO PAIN

## 2023-10-19 NOTE — ASSESSMENT & PLAN NOTE
The patient has a benign essential tremor.  Currently he is doing exceptionally well on primidone 50 mg twice daily.  He is tolerating the medication well without any side effects.  His tremor is only noticeable if he is holding something forcefully and does not negatively impact his activities of daily living or quality of life in any meaningful way.  At this time no additional diagnostics or treatments are indicated however will depend on his clinical course.

## 2023-10-19 NOTE — LETTER
October 19, 2023     Kamran Wilson MD  [address]  [place]  [facility] [address]    Patient: Sudheer Mayorga  YOB: 1945  Date of Visit: 10/19/2023      Dear Dr. Wilson:    Thank you for referring Sudheer Mayorga to me for evaluation. Below are my notes for this consultation.    If you have questions, please do not hesitate to call me. I look forward to following your patient along with you.         Sincerely,        Jose D Heard MD        CC: No Recipients    Jose D Heard MD  10/19/2023  3:01 PM  Signed  Sudheer Mayorga is a 78 y.o. male  10/19/2023  Kamran Wilson MD    Neurology Follow Up Note    Subjective     Sudheer Mayorga is a 78 y.o. male who is being evaluated  for tremor.  I previously saw the patient about 1 year ago.  At that time he was stable on primidone and I recommended increasing his dose to 50 mg twice daily.    Since his last visit, patient reported that he has been doing significantly better.  He is now taking primidone 50 mg twice daily.  He is tolerating the medication well without any side effects.  He reported that the only time he notices the tremor is when he is holding onto something tight.  Otherwise he is asymptomatic.  He has no difficulty writing, drinking from a cup, using utensils, holding objects, buttoning buttons etc.  He has been unable to identify a trigger for his tremor or factors that make it better or worse.    The patient reported that he has been eating well although he will be changing his diet.  He would like to lose 30 pounds.  He sleeps well with his CPAP.  His mood is stable.  [place]  Detailed neurologic and medical review of systems was notable for chronic knee issues and more recently a left rotator cuff issue.  There were no symptoms to suggest increased intracranial pressure, meningitis or systemic illness.  He has otherwise had no episodes of transient or static neurologic  dysfunction.    Review of Systems  Constitutional: negative  Eyes: negative  Ears, nose, mouth, throat, and face: negative  Respiratory: negative  Cardiovascular: negative  Gastrointestinal: negative  Genitourinary:negative  Integument/breast: negative  Hematologic/lymphatic: negative  Musculoskeletal:negative  Neurological: negative  Behavioral/Psych: negative  Endocrine: negative  Allergic/Immunologic: negative    Current Outpatient Medications   Medication Sig Dispense Refill    albuterol HFA (VENTOLIN HFA) 90 mcg/actuation inhaler INHALE 1 PUFF BY MOUTH EVERY 4 HOURS AS NEEDED      atorvastatin (LIPITOR) 20 mg tablet Take 20 mg by mouth once daily.      BREO ELLIPTA 200-25 mcg/dose blister with device powder for inhalation Inhale 1 puff as needed.        buPROPion XL (WELLBUTRIN XL) 150 mg 24 hr tablet Take 1 tablet (150 mg total) by mouth every morning. 30 tablet 2    coenzyme Q10 (COQ10) 100 mg capsule Take 100 mg by mouth daily.      escitalopram (LEXAPRO) 10 mg tablet Take 10 mg by mouth daily.      furosemide (LASIX) 20 mg tablet Take 1 tablet (20 mg total) by mouth every other day as needed (swelling). 90 tablet 3    glucosamine/chondr hoffman A sod (OSTEO BI-FLEX ORAL) Take 1 tablet by mouth daily.      metFORMIN (GLUCOPHAGE) 1,000 mg tablet Take 1,000 mg by mouth daily with breakfast.      multivit-min/folic/vit K/lycop (ONE-A-DAY MEN'S MULTIVITAMIN ORAL) Take 1 tablet by mouth daily.        nalTREXone (DEPADE) 50 mg tablet Take 0.5 tablets (25 mg total) by mouth daily. Mid-day.  Ok to start 1/4 tab daily for 3 days, then 1/2 tab daily for 1 week, then 1/2 ab twice a day. 30 tablet 2    primidone (MYSOLINE) 50 mg tablet Take 2 tablets (100 mg total) by mouth nightly. 180 tablet 3     No current facility-administered medications for this visit.       PMH/SH/FH : Unchanged since previous visit.    Objective     Physical Exam  Visit Vitals  /80 (BP Location: Right upper arm, Patient Position:  Sitting)   Pulse 84   SpO2 94%       General Appearance:  Alert, no distress, appears stated age  There was no parkinsonism  There was no tremor         Problem List Items Addressed This Visit        Other    Tremor - Primary    Current Assessment & Plan     The patient has a benign essential tremor.  Currently he is doing exceptionally well on primidone 50 mg twice daily.  He is tolerating the medication well without any side effects.  His tremor is only noticeable if he is holding something forcefully and does not negatively impact his activities of daily living or quality of life in any meaningful way.  At this time no additional diagnostics or treatments are indicated however will depend on his clinical course.            It was a real pleasure treating Sudheer Arambulaus today, thank you for allowing me to participate in the medical care. If you have any questions, please call me at any time. Sudheer Mayorga will follow up with me in the coming weeks to months and keep me updated by telephone. Sudheer Mayorga knows to notify me immediately if there is any change in the condition or if there are any new symptoms of transient or static neurologic dysfunction.    Jose D Heard MD

## 2023-10-19 NOTE — PROGRESS NOTES
"HISTORY OF PRESENT ILLNESS        Sudheer Mayorga is a 78 y.o. male following up on lifestyle management and weight loss as adjunctive treatment strategies for health condition caused by or worsened by overweight and obesity.     Obesity related conditions: Sleep apnea, prediabetes, dyslipidemia    Has been absent to care for 2 years. He has responded well with support and with medications.      He moved to [place] 1 year ago.  He needed knee surgery and \"all hell broke loose.\"    Immobility increased hunger and eating behaviors.  Weight regain almost back to weight high of 287 pounds.    Would like to reverse his trajectory and get back to under 250 pounds for best mobility and health.    Interval history:   Chart reviewed since our last visit.    Reviewed most recent labs    Subjective: Nutrition currently: egg whites in the morning, snack, light lunch and dinner,   Snacks are pretzel rods, cup of popcorns, 2 times a day.             Current Outpatient Medications on File Prior to Visit   Medication Sig Dispense Refill    albuterol HFA (VENTOLIN HFA) 90 mcg/actuation inhaler INHALE 1 PUFF BY MOUTH EVERY 4 HOURS AS NEEDED      atorvastatin (LIPITOR) 20 mg tablet Take 20 mg by mouth once daily.      BREO ELLIPTA 200-25 mcg/dose blister with device powder for inhalation Inhale 1 puff as needed.        coenzyme Q10 (COQ10) 100 mg capsule Take 100 mg by mouth daily.      escitalopram (LEXAPRO) 10 mg tablet Take 10 mg by mouth daily.      furosemide (LASIX) 20 mg tablet Take 1 tablet (20 mg total) by mouth every other day as needed (swelling). 90 tablet 3    glucosamine/chondr hoffman A sod (OSTEO BI-FLEX ORAL) Take 1 tablet by mouth daily.      metFORMIN (GLUCOPHAGE) 1,000 mg tablet Take 1,000 mg by mouth daily with breakfast.      multivit-min/folic/vit K/lycop (ONE-A-DAY MEN'S MULTIVITAMIN ORAL) Take 1 tablet by mouth daily.        primidone (MYSOLINE) 50 mg tablet Take 2 tablets (100 mg total) by mouth nightly. 180 " "tablet 3     No current facility-administered medications on file prior to visit.          Chocolate flavor           No results found for: \"HGBA1C\"  No results found for: \"CHOL\"  No results found for: \"HDL\"  No results found for: \"LDLCALC\"  No results found for: \"TRIG\"  No results found for: \"CHOLHDL\"  Lab Results   Component Value Date     05/18/2023    K 4.3 05/18/2023     05/18/2023    CO2 30 05/18/2023    BUN 21 05/18/2023    CREATININE 0.95 05/18/2023    GLUCOSE 95 05/18/2023    EGFR 83 05/18/2023       PHYSICAL EXAMINATION      Visit Vitals  /60 (BP Location: Left upper arm, Patient Position: Sitting)   Pulse 91   Resp 18   Ht 1.829 m (6')   Wt 126 kg (278 lb 4.8 oz)   SpO2 95%   BMI 37.74 kg/m²      Body mass index is 37.74 kg/m².    Physical Exam           ASSESSMENT AND PLAN         Sleep apnea, obstructive  There is a  cyclical relationship between sleep apnea and excess body weight.  Excess body weight often contributes to sleep apnea and a 10% body weight decrease can result in 30% less apneic episodes. Untreated sleep apnea and its resulting sleep deprived state can be extremely physically stressful and can lead to hypertension, atrial fibrillation, and mood disorders. This stress can also worsen insulin resistance and make weight loss more difficult.  It is very important to sleep well, and I reinforced that is very important to follow outlined medical treatments.       Prediabetes  We discussed today that without change in lifestyle prediabetes will progress to diabetes over time.  Continuing to have the same eating plan, level of nutrition, and lifestyle will not result in stable blood sugars over time.  Doing the same thing will result in worsening of the condition.  The only way to stop progression or produce remission of prediabetes is to eat a whole foods based diet, low in simple carbohydrates, get adequate physical activity, manage stress, and eat only in response to hunger.  " Certain medications are also available to help with this process.      Mixed hyperlipidemia  Elevated cholesterol levels often come from a blend of genetic and environmental factors.  Maximizing nutrition and physical activity is an important adjunct to medical management to treat elevated cholesterol levels and reduce the risk of arthrosclerotic disease such as heart attacks, strokes, and peripheral arterial disease.      Class 1 obesity with serious comorbidity and body mass index (BMI) of 33.0 to 33.9 in adult  Weight regain after significant loss with change in lifestyle, moved to [place] and back, and change in activity level.    Having difficulty with appetite and portion sizes.    Would like to restart medication-good tolerance to Wellbutrin and naltrexone in the past.    Discussed using [employer] versus grocery foods.  Nutrisystem gives him good structure and he enjoys the food.  He will reengage in this will also discussed with dietitian ways to sustain higher protein eating in the future independently.    Dosing and side effected explained and details provided on after visit summary.            Thank you very much for allowing us to participate in the care of your patient. Please do not hesitate to call or email if there are any questions.

## 2023-10-19 NOTE — PATIENT INSTRUCTIONS
Please use this link to access education on obesity as a medical condition, metabolic adaptation, insulin resistance, the basics of treatment, and medications for the management of overweight and obesity.     Each video in this series is 10 minutes or less.      [email or web address]      WHAT YOU SHOULD KNOW ABOUT WELLBUTRIN    Wellbutrin can help with food desire as well as appetite.  It is part of a branded medication that is FDA approved for weight loss, but using it on its own would be considered off label use.  I choose to do this off label use because it is much more cost effective for patient than using the branded prescription Contrave.    DOSING:  Initial: 150mg extended release or sustained release in the morning.   Wellbutrin is best taken in the morning as it can be activating and interfere with sleep if taken later in the day.    SIDE EFFECTS: Mild headache, nausea, and feelings of jitteriness. Oftentimes the side effects will resolve within the first week, and if mild, please continue the medication as the symptoms will likely go away.      WHO SHOULD NOT TAKE WELLBUTRIN: Patients with uncontrolled hypertension, seizure disorder, use of other bupropion-containing products, chronic opioid use, undergoing abrupt discontinuation of ethanol or sedatives, use within 14 days of MAO inhibitors, pregnancy, or breastfeeding.    Naltrexone helps to block cravings for food.     Start Naltrexone at 1/4 tab once a day.   After 1 week, increase to 1/2 tab once a day -- morning or Lunchtime/Early afternoon.   If you have alcohol occasionally, I encourage you to get into the habit of using Naltrexone at Lunchtime or in the afternoon.  This timing will ensure that you have the medication in your system when you have a drink in the evening and prevent any side effects from happening the next day.  If you never have alcohol, either time of day is appropriate for dosing.   Maximum dose is 1/2 tab  twice a day.     Naltrexone is generally well-tolerated. Most common side effects of naltrexone include fatigue, mild headache, mild nausea.  It should not be used in someone with history of liver damage.      You must avoid alcohol, opiates and excessive sugar 4-5 days prior to initiating the dose or you can feel symptoms of withdrawal such as nausea, diarrhea, headache, sweats.    Coverage: Naltrexone is a generic medication and is generally covered by insurance.  If you do not have coverage, please go the to [employer] website for best local pricing and coupons.

## 2023-10-19 NOTE — PROGRESS NOTES
Sudheer Mayorga is a 78 y.o. male  10/19/2023  Kamran Wilson MD    Neurology Follow Up Note    Subjective     Sudheer Mayorga is a 78 y.o. male who is being evaluated  for tremor.  I previously saw the patient about 1 year ago.  At that time he was stable on primidone and I recommended increasing his dose to 50 mg twice daily.    Since his last visit, patient reported that he has been doing significantly better.  He is now taking primidone 50 mg twice daily.  He is tolerating the medication well without any side effects.  He reported that the only time he notices the tremor is when he is holding onto something tight.  Otherwise he is asymptomatic.  He has no difficulty writing, drinking from a cup, using utensils, holding objects, buttoning buttons etc.  He has been unable to identify a trigger for his tremor or factors that make it better or worse.    The patient reported that he has been eating well although he will be changing his diet.  He would like to lose 30 pounds.  He sleeps well with his CPAP.  His mood is stable.  [place]  Detailed neurologic and medical review of systems was notable for chronic knee issues and more recently a left rotator cuff issue.  There were no symptoms to suggest increased intracranial pressure, meningitis or systemic illness.  He has otherwise had no episodes of transient or static neurologic dysfunction.    Review of Systems  Constitutional: negative  Eyes: negative  Ears, nose, mouth, throat, and face: negative  Respiratory: negative  Cardiovascular: negative  Gastrointestinal: negative  Genitourinary:negative  Integument/breast: negative  Hematologic/lymphatic: negative  Musculoskeletal:negative  Neurological: negative  Behavioral/Psych: negative  Endocrine: negative  Allergic/Immunologic: negative    Current Outpatient Medications   Medication Sig Dispense Refill    albuterol HFA (VENTOLIN HFA) 90 mcg/actuation inhaler INHALE 1 PUFF BY  MOUTH EVERY 4 HOURS AS NEEDED      atorvastatin (LIPITOR) 20 mg tablet Take 20 mg by mouth once daily.      BREO ELLIPTA 200-25 mcg/dose blister with device powder for inhalation Inhale 1 puff as needed.        buPROPion XL (WELLBUTRIN XL) 150 mg 24 hr tablet Take 1 tablet (150 mg total) by mouth every morning. 30 tablet 2    coenzyme Q10 (COQ10) 100 mg capsule Take 100 mg by mouth daily.      escitalopram (LEXAPRO) 10 mg tablet Take 10 mg by mouth daily.      furosemide (LASIX) 20 mg tablet Take 1 tablet (20 mg total) by mouth every other day as needed (swelling). 90 tablet 3    glucosamine/chondr hoffman A sod (OSTEO BI-FLEX ORAL) Take 1 tablet by mouth daily.      metFORMIN (GLUCOPHAGE) 1,000 mg tablet Take 1,000 mg by mouth daily with breakfast.      multivit-min/folic/vit K/lycop (ONE-A-DAY MEN'S MULTIVITAMIN ORAL) Take 1 tablet by mouth daily.        nalTREXone (DEPADE) 50 mg tablet Take 0.5 tablets (25 mg total) by mouth daily. Mid-day.  Ok to start 1/4 tab daily for 3 days, then 1/2 tab daily for 1 week, then 1/2 ab twice a day. 30 tablet 2    primidone (MYSOLINE) 50 mg tablet Take 2 tablets (100 mg total) by mouth nightly. 180 tablet 3     No current facility-administered medications for this visit.       PMH/SH/FH : Unchanged since previous visit.    Objective     Physical Exam  Visit Vitals  /80 (BP Location: Right upper arm, Patient Position: Sitting)   Pulse 84   SpO2 94%       General Appearance:  Alert, no distress, appears stated age  There was no parkinsonism  There was no tremor         Problem List Items Addressed This Visit        Other    Tremor - Primary    Current Assessment & Plan     The patient has a benign essential tremor.  Currently he is doing exceptionally well on primidone 50 mg twice daily.  He is tolerating the medication well without any side effects.  His tremor is only noticeable if he is holding something forcefully and does not negatively impact his activities of  daily living or quality of life in any meaningful way.  At this time no additional diagnostics or treatments are indicated however will depend on his clinical course.            It was a real pleasure treating Sudheer Mayorga today, thank you for allowing me to participate in the medical care. If you have any questions, please call me at any time. Sudheer Mukesh will follow up with me in the coming weeks to months and keep me updated by telephone. Sudheer Mukesh knows to notify me immediately if there is any change in the condition or if there are any new symptoms of transient or static neurologic dysfunction.    Jose D Heard MD

## 2023-10-23 NOTE — ASSESSMENT & PLAN NOTE
Weight regain after significant loss with change in lifestyle, moved to [place] and back, and change in activity level.    Having difficulty with appetite and portion sizes.    Would like to restart medication-good tolerance to Wellbutrin and naltrexone in the past.    Discussed using [employer] versus grocery foods.  N[employer]ystem gives him good structure and he enjoys the food.  He will reengage in this will also discussed with dietitian ways to sustain higher protein eating in the future independently.    Dosing and side effected explained and details provided on after visit summary.

## 2023-11-02 RX ORDER — BUPROPION HYDROCHLORIDE 150 MG/1
150 TABLET ORAL EVERY MORNING
Qty: 90 TABLET | Refills: 0 | Status: SHIPPED | OUTPATIENT
Start: 2023-11-02 | End: 2024-11-18

## 2023-11-02 NOTE — TELEPHONE ENCOUNTER
"Medicine Refill Request    Last Office Visit: 10/19/2023   Last Consult Visit: Visit date not found  Last Telemedicine Visit: 8/3/2021 Shayy Taveras CRNP    Next Appointment: 11/6/2023      Current Outpatient Medications:     albuterol HFA (VENTOLIN HFA) 90 mcg/actuation inhaler, INHALE 1 PUFF BY MOUTH EVERY 4 HOURS AS NEEDED, Disp: , Rfl:     atorvastatin (LIPITOR) 20 mg tablet, Take 20 mg by mouth once daily., Disp: , Rfl:     BREO ELLIPTA 200-25 mcg/dose blister with device powder for inhalation, Inhale 1 puff as needed.  , Disp: , Rfl:     buPROPion XL (WELLBUTRIN XL) 150 mg 24 hr tablet, Take 1 tablet (150 mg total) by mouth every morning., Disp: 30 tablet, Rfl: 2    coenzyme Q10 (COQ10) 100 mg capsule, Take 100 mg by mouth daily., Disp: , Rfl:     escitalopram (LEXAPRO) 10 mg tablet, Take 10 mg by mouth daily., Disp: , Rfl:     furosemide (LASIX) 20 mg tablet, Take 1 tablet (20 mg total) by mouth every other day as needed (swelling)., Disp: 90 tablet, Rfl: 3    glucosamine/chondr hoffman A sod (OSTEO BI-FLEX ORAL), Take 1 tablet by mouth daily., Disp: , Rfl:     metFORMIN (GLUCOPHAGE) 1,000 mg tablet, Take 1,000 mg by mouth daily with breakfast., Disp: , Rfl:     multivit-min/folic/vit K/lycop (ONE-A-DAY MEN'S MULTIVITAMIN ORAL), Take 1 tablet by mouth daily.  , Disp: , Rfl:     nalTREXone (DEPADE) 50 mg tablet, Take 0.5 tablets (25 mg total) by mouth daily. Mid-day.  Ok to start 1/4 tab daily for 3 days, then 1/2 tab daily for 1 week, then 1/2 ab twice a day., Disp: 30 tablet, Rfl: 2    primidone (MYSOLINE) 50 mg tablet, Take 2 tablets (100 mg total) by mouth nightly., Disp: 180 tablet, Rfl: 3      BP Readings from Last 3 Encounters:   10/19/23 134/80   10/19/23 130/60   09/29/23 116/64       Recent Lab results:  No results found for: \"CHOL\", No results found for: \"HDL\", No results found for: \"LDLCALC\", No results found for: \"TRIG\"     Lab Results   Component Value Date    GLUCOSE 95 05/18/2023   , " "No results found for: \"HGBA1C\"      Lab Results   Component Value Date    CREATININE 0.95 05/18/2023       Lab Results   Component Value Date    TSH 1.19 04/11/2022         No results found for: \"HGBA1C\"  "

## 2023-11-08 RX ORDER — PRIMIDONE 50 MG/1
100 TABLET ORAL NIGHTLY
Qty: 180 TABLET | Refills: 3 | Status: SHIPPED | OUTPATIENT
Start: 2023-11-08 | End: 2024-10-04

## 2024-01-29 ENCOUNTER — TELEPHONE (OUTPATIENT)
Dept: SCHEDULING | Facility: CLINIC | Age: 79
End: 2024-01-29
Payer: MEDICARE

## 2024-02-09 ENCOUNTER — TELEPHONE (OUTPATIENT)
Dept: SCHEDULING | Facility: CLINIC | Age: 79
End: 2024-02-09
Payer: MEDICARE

## 2024-02-09 NOTE — TELEPHONE ENCOUNTER
I called and left a message for Mr. Mayorga that we do not want him to take  furosemide at this time.  I told him to wear compression stockings during the day and keep his legs elevated when he is sitting.  I instructed him to call us back with any questions.

## 2024-02-09 NOTE — TELEPHONE ENCOUNTER
Pt is having a rotator cuff surgery on 2/15/24 , pt calling with concerns swelling in both his legs and would to see MD prior to that date or let him know if he needs to push back his surgery       P:[phone]

## 2024-02-09 NOTE — TELEPHONE ENCOUNTER
I received a call from Mr. Mayorga.  He informed me he is seeing Dr. Wilson today to be cleared for his shoulder surgery with Dr. Mingo Phipps.     He denies any weight gain.  He said he lost 18 lbs on purpose and his weight is 256 lbs.  He denies shortness of breath or abdominal bloating.  He is complaining of lower extremity edema.  He said he has had it for the last few months.  He has furosemide ordered but he said he never took this nor does he have it.    At his last visit in September the note said his swelling is most likely from venous insufficiency.  He has been told in the past to wear compression stockings but he has not used them.      PSR pool: He is requesting his last EKG be faxed to Dr. Phipps's office.  He did not have their fax number.  The office number is [phone]. Please fax.    SD:  Do you want him to use his PRN lasix for the swelling?  He never used it.

## 2024-02-09 NOTE — TELEPHONE ENCOUNTER
I called Mr. Mayorga back and left a message requesting he call me back regarding his lower extremity edema and abdominal bloating.

## 2024-04-23 NOTE — ASSESSMENT & PLAN NOTE
Post Op Note    Ramu Rubio is a 71 y.o. male s/p cysto uscope with laser, left stent insertion performed 04/22/2024.  Ramu Rubio is a patient of Dr. Dr. Purcell and is seen at the [place] office.     How would you rate your pain on a scale from 1 to 10, 10 being the worst pain ever? 4  Have you had a fever? No    Have your bowel movements been regular? Yes  Do you have any difficulty urinating? No     Do you have any other questions or concerns that I can address at this time?   Contacted and spoke with the patient who reports he is feeling ok.  Able to sleep last night. Has had stent in the past. Aware of stent colic and medication.    Patient aware he needs a second surgery and one of our surgery schedulers will contact him.    Patient to call the office with any questions or issues       There is a  cyclical relationship between sleep apnea and excess body weight.  Excess body weight often contributes to sleep apnea and a 10% body weight decrease can result in 30% less apneic episodes. Untreated sleep apnea and its resulting sleep deprived state can be extremely physically stressful and can lead to hypertension, atrial fibrillation, and mood disorders. This stress can also worsen insulin resistance and make weight loss more difficult.  It is very important to sleep well, and I reinforced that is very important to follow outlined medical treatments.

## 2024-04-24 RX ORDER — NALTREXONE HYDROCHLORIDE 50 MG/1
TABLET, FILM COATED ORAL
Qty: 30 TABLET | Refills: 2 | OUTPATIENT
Start: 2024-04-24

## 2024-04-24 NOTE — TELEPHONE ENCOUNTER
Last Medical provider visit: 10/19/23            Last Dietitian or EP visit:    Next visit:   Comments: cancelled appt 2/15/24

## 2024-05-20 NOTE — TELEPHONE ENCOUNTER
Pt called and asked for a sooner appt     Pt says he is having numbness and tingling in his fingers    Pt can be reached at [phone]

## 2024-05-20 NOTE — TELEPHONE ENCOUNTER
Pt calling back to schedule with Dr. Gray     Can be reached at [phone]  Pt states please leave VM

## 2024-05-20 NOTE — TELEPHONE ENCOUNTER
Could someone please reach out to the pt with the onset of symptoms. Please advise if pt needs to be seen sooner. Thank you in advance.

## 2024-05-20 NOTE — TELEPHONE ENCOUNTER
I called and left a message for Mr. Mayorga to call us back regarding the symptoms he is having.    no

## 2024-05-21 ENCOUNTER — OFFICE VISIT (OUTPATIENT)
Dept: CARDIOLOGY | Facility: CLINIC | Age: 79
End: 2024-05-21
Payer: MEDICARE

## 2024-05-21 VITALS
HEIGHT: 72 IN | OXYGEN SATURATION: 96 % | BODY MASS INDEX: 36.31 KG/M2 | WEIGHT: 268.1 LBS | SYSTOLIC BLOOD PRESSURE: 110 MMHG | HEART RATE: 71 BPM | DIASTOLIC BLOOD PRESSURE: 60 MMHG

## 2024-05-21 DIAGNOSIS — I25.10 ATHEROSCLEROSIS OF CORONARY ARTERY OF NATIVE HEART WITHOUT ANGINA PECTORIS, UNSPECIFIED VESSEL OR LESION TYPE: Primary | ICD-10-CM

## 2024-05-21 DIAGNOSIS — I77.810 DILATED AORTIC ROOT (CMS/HCC): ICD-10-CM

## 2024-05-21 DIAGNOSIS — E78.5 HYPERLIPIDEMIA, UNSPECIFIED HYPERLIPIDEMIA TYPE: ICD-10-CM

## 2024-05-21 DIAGNOSIS — G47.33 OSA ON CPAP: ICD-10-CM

## 2024-05-21 DIAGNOSIS — R60.9 EDEMA, UNSPECIFIED TYPE: ICD-10-CM

## 2024-05-21 PROCEDURE — 93000 ELECTROCARDIOGRAM COMPLETE: CPT | Performed by: INTERNAL MEDICINE

## 2024-05-21 PROCEDURE — 99214 OFFICE O/P EST MOD 30 MIN: CPT | Performed by: INTERNAL MEDICINE

## 2024-05-21 NOTE — PROGRESS NOTES
Cardiology Note          HPI   Sudheer Mayorga is a 79 y.o. male who presents for a follow-up as well as concerns of hand tingling.    He is a pleasant 79 y.o. with a history of prediabetes, dyslipidemia, sleep apnea on CPAP and obesity who initially presented to me September 2020 to establish care and advice on reducing cardiovascular risk.  At that present time he did not formally exercise. He tells me he is battled weight loss his adult life.  He is tried numerous diets with only modest success before he stops.  He had recently got up to 295 pounds and improved his eating to come down to 285 pounds at our initial visit.  His weight has fluctuated between 275 and 295 pounds in recent years.  He has considered bariatric surgery and asked my opinion on this.  We discussed in all out effort short of this prior to considering but that could potentially be an option even at his age.  He has chronic tendinitis of his right knee but feels it would not prevent him from doing a daily walking program.  He also recognizes it may improve from losing weight. I previously had him see Dr. Garcia and he lost 40 pounds but gained much of it back.     Since his last visit, he underwent shoulder surgery in February which has worked reasonably well.  He has had a bit of edema recently but it correlates to when he started using ibuprofen.  He is using his CPAP.  He would like to be more active but an ulceration on his toe has slowed him down.  He has noted some tingling and numbness in both hands.  He denies chest pain, shortness of breath at rest, palpitations, orthopnea, paroxysmal nocturnal dyspnea, presyncope, syncope.        Past Medical History:   Diagnosis Date    Allergic rhinitis     Asthma     Depression     Knee tendinitis     right    Lipid disorder     HONEY on CPAP     Prediabetes      Past Surgical History:   Procedure Laterality Date    HERNIA REPAIR      ROTATOR CUFF REPAIR Left 02/15/2024    shoulder       SOCIAL  HISTORY  Social History     Tobacco Use    Smoking status: Never    Smokeless tobacco: Never   Vaping Use    Vaping Use: Never used   Substance Use Topics    Alcohol use: Yes     Comment: social    Drug use: Yes     Types: Marijuana     Comment: gummies      4 daughters ages 39-45 as of . .  He works as a .    Family Status   Relation Name Status    Bio Mother   at age 91        CVA at 88    Bio Father   at age 92        DM onset 79yo        ALLERGIES  Chocolate flavor      Outpatient Encounter Medications as of 2024:     albuterol HFA (VENTOLIN HFA) 90 mcg/actuation inhaler, INHALE 1 PUFF BY MOUTH EVERY 4 HOURS AS NEEDED    atorvastatin (LIPITOR) 20 mg tablet, Take 20 mg by mouth once daily.    BREO ELLIPTA 200-25 mcg/dose blister with device powder for inhalation, Inhale 1 puff as needed.      buPROPion XL (WELLBUTRIN XL) 150 mg 24 hr tablet, Take 1 tablet (150 mg total) by mouth every morning.    coenzyme Q10 (COQ10) 100 mg capsule, Take 100 mg by mouth daily. 20 mg    escitalopram (LEXAPRO) 10 mg tablet, Take 10 mg by mouth daily.    glucosamine/chondr hoffman A sod (OSTEO BI-FLEX ORAL), Take 1 tablet by mouth daily.    multivit-min/folic/vit K/lycop (ONE-A-DAY MEN'S MULTIVITAMIN ORAL), Take 1 tablet by mouth daily.      nalTREXone (DEPADE) 50 mg tablet, Take 0.5 tablets (25 mg total) by mouth daily. Mid-day.  Ok to start 1/4 tab daily for 3 days, then 1/2 tab daily for 1 week, then 1/2 ab twice a day.    primidone (MYSOLINE) 50 mg tablet, Take 2 tablets (100 mg total) by mouth nightly.    furosemide (LASIX) 20 mg tablet, Take 1 tablet (20 mg total) by mouth every other day as needed (swelling).    metFORMIN (GLUCOPHAGE) 1,000 mg tablet, Take 1,000 mg by mouth daily with breakfast.    ROS  As per HPI. Otherwise all systems are reviewed and are negative.    Objective   Visit Vitals  Pulse 71   Ht 1.829 m (6')   Wt 122 kg (268 lb 1.6 oz)   SpO2 96%   BMI 36.36 kg/m²  "      Wt Readings from Last 3 Encounters:   05/21/24 122 kg (268 lb 1.6 oz)   10/19/23 126 kg (278 lb 4.8 oz)   09/29/23 124 kg (272 lb 12.8 oz)       Physical Exam  Vitals reviewed.   Constitutional:       Appearance: He is well-developed.   HENT:      Head: Normocephalic and atraumatic.   Eyes:      General: No scleral icterus.  Neck:      Vascular: No JVD.   Cardiovascular:      Rate and Rhythm: Normal rate and regular rhythm.      Heart sounds: Normal heart sounds.      Comments: 1-2+ pedal pulses, excellent (<1s) capillary refill of both feet  Pulmonary:      Breath sounds: Normal breath sounds.   Musculoskeletal:         General: Swelling (trace b/l ankle and pedal edema) present. Normal range of motion.   Skin:     General: Skin is warm and dry.   Neurological:      Mental Status: He is alert and oriented to person, place, and time.   Psychiatric:         Judgment: Judgment normal.         Lab Results   Component Value Date    GLUCOSE 95 05/18/2023    CALCIUM 9.2 05/18/2023     05/18/2023    K 4.3 05/18/2023    CO2 30 05/18/2023     05/18/2023    BUN 21 05/18/2023    CREATININE 0.95 05/18/2023     No results found for: \"ALT\", \"AST\", \"GGT\", \"ALKPHOS\", \"BILITOT\"  No results found for: \"WBC\", \"HGB\", \"HCT\", \"MCV\", \"PLT\"  Lab Results   Component Value Date    TSH 1.19 04/11/2022     No results found for: \"CHOL\"  No results found for: \"HDL\"  No results found for: \"LDLCALC\"  No results found for: \"TRIG\"  No results found for: \"CHOLHDL\"  No results found for: \"HGBA1C\"    Labs 5/26/2020:  Chol 141, HDL 42, trig 79, LDL 83    Labs 9/1/2020:  BUN 18, creat 0.92, na 141, k4.3, normal LFTs. TSH 1.56. ZjqN0n=8.9.     Labs 8/26/2021:  Glucose 113, BUN 21, creatinine 0.95, sodium 141, potassium 4.5, liver function test are normal, hemoglobin A1c 5.8, cholesterol 148, HDL 51, triglycerides 58, LDL 84.        Labs January 23, 2024:  Glucose 89, BUN 16, creatinine 0.93, sodium 140, Tessman 4.2, hemoglobin 13.9.   "   EKG    Performed on 05/21/2024 and personally reviewed:  Sinus  Rhythm at 71bpm    Imaging/Testing    CT coronary calcium February 2016:  Coronary calcium score of 28 located in the LAD.  For age, gender and ethnicity this is the 27th percentile for risk.    PFTs March 2020:  Mild restriction    Sleep Study 2018  AHI 72.    Echocardiogram November 23, 2021:  Normal-sized LV. Preserved LV systolic function. Estimated EF 70- 75%. No regional wall motion abnormalities. Normal LV wall thickness.  Tricuspid aortic valve. Sclerotic aortic valve leaflets. Aortic root sclerotic. Mild dilatation of the aortic root.3.9cm  Mild mitral annular calcification. Trace mitral valve regurgitation. Normal-sized LA.  Normal-sized RV. Normal RV systolic function. Mild tricuspid valve regurgitation. Estimated RVSP = 29 mmHg. Normal-sized RA.  Normal-sized IVC. IVC collapses >50% during inspiration.    Echo May 23, 2023:    Technically difficult study.    Left Ventricle: Normal ventricle size. Normal wall thickness. Preserved systolic function. Estimated EF 70%. No regional wall motion abnormalities. Normal diastolic filling pattern for age.    Aorta: Aortic root sclerotic. Dilatation of the ascending aorta with diameter 4.1 cm.    Aortic Valve: Tricuspid valve.  Sclerotic leaflets. No regurgitation. No stenosis.    Tricuspid Valve: Mild regurgitation.    Pulmonic Valve: No regurgitation.    Mitral Valve: Mild regurgitation with estimated right ventricular systolic pressure 29 mmHg.    IVC/SVC: Inferior vena cava not well visualized.    Pericardium: No evidence of pericardial effusion.    Right Ventricle: Normal ventricle size. Normal systolic function.    Left Atrium: Normal sized atrium.    Right Atrium: Normal sized atrium.    Compared with November 2021, there has been a slight interval increase in aortic root diameter of 0.2 cm. Otherwise no significant change.        ASSESSMENT AND PLAN.    1. Cardiovascular exam. His  cardiovascular exam including heart and carotids are normal today. His lipids are acceptable on Atorvastatin as is his A1C on Metformin. We discussed weight loss would go a long way in reducing his risk factors for CV disease. Due to good activity tolerance without CV symptoms I have not ordered stress testing for now. Echo without major pathology.    2.  Coronary atherosclerosis.  He had coronary calcium noted on a coronary calcium CAT scan in 2016.  His burden was still less than average for his age.  Nonetheless we should target an LDL of less than 100 which she is currently meeting on atorvastatin.    3. BMI 36 We discussed at prior visit how much of his medical issues would be eliminated or improved with weight loss including his prediabetes, dyslipidemia and sleep apnea. His weight has generally been 275-295 pounds in the last several years.  He lost 40 pounds with Dr. Garcia, but unfortunately gained 25 pounds back in the summer. He lost 30 pounds with Nutrisystem diet but gained it back.      4. Dyslipidemia. Well controlled on Atorvatstatin 20 mg Daily.  Would target LDL of 100 or less which he currently is meeting this target.    5. HONEY. Reports AHI went from 72 to 7 with treatment.  Follows with Dr. Hercules.  Inconsistant with CPAP. We discussed benefits.    6. Prediabtes. Controlled with Metformin. We discussed weight loss.    7.  Dilated aortic root.  Found incidentally on an echocardiogram.  We will follow-up on this with an echocardiogram, next one planned for October. Counseled on precautions.    8. Edema. JVP and IVC normal. Likely venous insufficiency.        Thank you for allowing me to participate in the care of this patient. Reviewed correspondance from weight management and neurology.  Reviewed interim labs.  I recommended starting more regular exercise.  I will plan to see him back in October as previously scheduled with an echocardiogram.   Please do not hesitate to contact me with any  questions or concerns.    Sincerely,    Declan Gray MD  5/21/2024

## 2024-05-21 NOTE — LETTER
June 2, 2024     Kamran Wilson MD  [address]  [address]  [place] [id number]    Patient: Sudheer Mayorga  YOB: 1945  Date of Visit: 5/21/2024      Dear Dr. Wilson:    Thank you for referring Sudheer Mayorga to me for evaluation. Below are my notes for this consultation.    If you have questions, please do not hesitate to call me. I look forward to following your patient along with you.         Sincerely,        Declan Gray MD        CC: MD Isaac Horan Steven M, MD  6/2/2024 11:31 PM  Sign when Signing Visit     Cardiology Note          HPI   Sudheer Mayorga is a 79 y.o. male who presents for a follow-up as well as concerns of hand tingling.    He is a pleasant 79 y.o. with a history of prediabetes, dyslipidemia, sleep apnea on CPAP and obesity who initially presented to me September 2020 to establish care and advice on reducing cardiovascular risk.  At that present time he did not formally exercise. He tells me he is battled weight loss his adult life.  He is tried numerous diets with only modest success before he stops.  He had recently got up to 295 pounds and improved his eating to come down to 285 pounds at our initial visit.  His weight has fluctuated between 275 and 295 pounds in recent years.  He has considered bariatric surgery and asked my opinion on this.  We discussed in all out effort short of this prior to considering but that could potentially be an option even at his age.  He has chronic tendinitis of his right knee but feels it would not prevent him from doing a daily walking program.  He also recognizes it may improve from losing weight. I previously had him see Dr. Garcia and he lost 40 pounds but gained much of it back.     Since his last visit, he underwent shoulder surgery in February which has worked reasonably well.  He has had a bit of edema recently but it correlates to when he started using ibuprofen.  He is using his CPAP.  He would like to  be more active but an ulceration on his toe has slowed him down.  He has noted some tingling and numbness in both hands.  He denies chest pain, shortness of breath at rest, palpitations, orthopnea, paroxysmal nocturnal dyspnea, presyncope, syncope.        Past Medical History:   Diagnosis Date   • Allergic rhinitis    • Asthma    • Depression    • Knee tendinitis     right   • Lipid disorder    • HONEY on CPAP    • Prediabetes      Past Surgical History:   Procedure Laterality Date   • HERNIA REPAIR     • ROTATOR CUFF REPAIR Left 02/15/2024    shoulder       SOCIAL HISTORY  Social History     Tobacco Use   • Smoking status: Never   • Smokeless tobacco: Never   Vaping Use   • Vaping Use: Never used   Substance Use Topics   • Alcohol use: Yes     Comment: social   • Drug use: Yes     Types: Marijuana     Comment: gummies      4 daughters ages 39-45 as of . .  He works as a .    Family Status   Relation Name Status   • Bio Mother   at age 91        CVA at 88   • Bio Father   at age 92        DM onset 79yo        ALLERGIES  Chocolate flavor      Outpatient Encounter Medications as of 2024:   •  albuterol HFA (VENTOLIN HFA) 90 mcg/actuation inhaler, INHALE 1 PUFF BY MOUTH EVERY 4 HOURS AS NEEDED  •  atorvastatin (LIPITOR) 20 mg tablet, Take 20 mg by mouth once daily.  •  BREO ELLIPTA 200-25 mcg/dose blister with device powder for inhalation, Inhale 1 puff as needed.    •  buPROPion XL (WELLBUTRIN XL) 150 mg 24 hr tablet, Take 1 tablet (150 mg total) by mouth every morning.  •  coenzyme Q10 (COQ10) 100 mg capsule, Take 100 mg by mouth daily. 20 mg  •  escitalopram (LEXAPRO) 10 mg tablet, Take 10 mg by mouth daily.  •  glucosamine/chondr hoffman A sod (OSTEO BI-FLEX ORAL), Take 1 tablet by mouth daily.  •  multivit-min/folic/vit K/lycop (ONE-A-DAY MEN'S MULTIVITAMIN ORAL), Take 1 tablet by mouth daily.    •  nalTREXone (DEPADE) 50 mg tablet, Take 0.5 tablets (25 mg total) by mouth  "daily. Mid-day.  Ok to start 1/4 tab daily for 3 days, then 1/2 tab daily for 1 week, then 1/2 ab twice a day.  •  primidone (MYSOLINE) 50 mg tablet, Take 2 tablets (100 mg total) by mouth nightly.  •  furosemide (LASIX) 20 mg tablet, Take 1 tablet (20 mg total) by mouth every other day as needed (swelling).  •  metFORMIN (GLUCOPHAGE) 1,000 mg tablet, Take 1,000 mg by mouth daily with breakfast.    ROS  As per HPI. Otherwise all systems are reviewed and are negative.    Objective   Visit Vitals  Pulse 71   Ht 1.829 m (6')   Wt 122 kg (268 lb 1.6 oz)   SpO2 96%   BMI 36.36 kg/m²       Wt Readings from Last 3 Encounters:   05/21/24 122 kg (268 lb 1.6 oz)   10/19/23 126 kg (278 lb 4.8 oz)   09/29/23 124 kg (272 lb 12.8 oz)       Physical Exam  Vitals reviewed.   Constitutional:       Appearance: He is well-developed.   HENT:      Head: Normocephalic and atraumatic.   Eyes:      General: No scleral icterus.  Neck:      Vascular: No JVD.   Cardiovascular:      Rate and Rhythm: Normal rate and regular rhythm.      Heart sounds: Normal heart sounds.      Comments: 1-2+ pedal pulses, excellent (<1s) capillary refill of both feet  Pulmonary:      Breath sounds: Normal breath sounds.   Musculoskeletal:         General: Swelling (trace b/l ankle and pedal edema) present. Normal range of motion.   Skin:     General: Skin is warm and dry.   Neurological:      Mental Status: He is alert and oriented to person, place, and time.   Psychiatric:         Judgment: Judgment normal.         Lab Results   Component Value Date    GLUCOSE 95 05/18/2023    CALCIUM 9.2 05/18/2023     05/18/2023    K 4.3 05/18/2023    CO2 30 05/18/2023     05/18/2023    BUN 21 05/18/2023    CREATININE 0.95 05/18/2023     No results found for: \"ALT\", \"AST\", \"GGT\", \"ALKPHOS\", \"BILITOT\"  No results found for: \"WBC\", \"HGB\", \"HCT\", \"MCV\", \"PLT\"  Lab Results   Component Value Date    TSH 1.19 04/11/2022     No results found for: \"CHOL\"  No results found " "for: \"HDL\"  No results found for: \"LDLCALC\"  No results found for: \"TRIG\"  No results found for: \"CHOLHDL\"  No results found for: \"HGBA1C\"    Labs 5/26/2020:  Chol 141, HDL 42, trig 79, LDL 83    Labs 9/1/2020:  BUN 18, creat 0.92, na 141, k4.3, normal LFTs. TSH 1.56. VauU6h=4.9.     Labs 8/26/2021:  Glucose 113, BUN 21, creatinine 0.95, sodium 141, potassium 4.5, liver function test are normal, hemoglobin A1c 5.8, cholesterol 148, HDL 51, triglycerides 58, LDL 84.        Labs January 23, 2024:  Glucose 89, BUN 16, creatinine 0.93, sodium 140, Tessman 4.2, hemoglobin 13.9.     EKG    Performed on 05/21/2024 and personally reviewed:  Sinus  Rhythm at 71bpm    Imaging/Testing    CT coronary calcium February 2016:  Coronary calcium score of 28 located in the LAD.  For age, gender and ethnicity this is the 27th percentile for risk.    PFTs March 2020:  Mild restriction    Sleep Study 2018  AHI 72.    Echocardiogram November 23, 2021:  Normal-sized LV. Preserved LV systolic function. Estimated EF 70- 75%. No regional wall motion abnormalities. Normal LV wall thickness.  Tricuspid aortic valve. Sclerotic aortic valve leaflets. Aortic root sclerotic. Mild dilatation of the aortic root.3.9cm  Mild mitral annular calcification. Trace mitral valve regurgitation. Normal-sized LA.  Normal-sized RV. Normal RV systolic function. Mild tricuspid valve regurgitation. Estimated RVSP = 29 mmHg. Normal-sized RA.  Normal-sized IVC. IVC collapses >50% during inspiration.    Echo May 23, 2023:  •  Technically difficult study.  •  Left Ventricle: Normal ventricle size. Normal wall thickness. Preserved systolic function. Estimated EF 70%. No regional wall motion abnormalities. Normal diastolic filling pattern for age.  •  Aorta: Aortic root sclerotic. Dilatation of the ascending aorta with diameter 4.1 cm.  •  Aortic Valve: Tricuspid valve.  Sclerotic leaflets. No regurgitation. No stenosis.  •  Tricuspid Valve: Mild regurgitation.  •  " Pulmonic Valve: No regurgitation.  •  Mitral Valve: Mild regurgitation with estimated right ventricular systolic pressure 29 mmHg.  •  IVC/SVC: Inferior vena cava not well visualized.  •  Pericardium: No evidence of pericardial effusion.  •  Right Ventricle: Normal ventricle size. Normal systolic function.  •  Left Atrium: Normal sized atrium.  •  Right Atrium: Normal sized atrium.  •  Compared with November 2021, there has been a slight interval increase in aortic root diameter of 0.2 cm. Otherwise no significant change.        ASSESSMENT AND PLAN.    1. Cardiovascular exam. His cardiovascular exam including heart and carotids are normal today. His lipids are acceptable on Atorvastatin as is his A1C on Metformin. We discussed weight loss would go a long way in reducing his risk factors for CV disease. Due to good activity tolerance without CV symptoms I have not ordered stress testing for now. Echo without major pathology.    2.  Coronary atherosclerosis.  He had coronary calcium noted on a coronary calcium CAT scan in 2016.  His burden was still less than average for his age.  Nonetheless we should target an LDL of less than 100 which she is currently meeting on atorvastatin.    3. BMI 36 We discussed at prior visit how much of his medical issues would be eliminated or improved with weight loss including his prediabetes, dyslipidemia and sleep apnea. His weight has generally been 275-295 pounds in the last several years.  He lost 40 pounds with Dr. Garcia, but unfortunately gained 25 pounds back in the summer. He lost 30 pounds with Nutrisystem diet but gained it back.      4. Dyslipidemia. Well controlled on Atorvatstatin 20 mg Daily.  Would target LDL of 100 or less which he currently is meeting this target.    5. HONEY. Reports AHI went from 72 to 7 with treatment.  Follows with Dr. Herculse.  Inconsistant with CPAP. We discussed benefits.    6. Prediabtes. Controlled with Metformin. We discussed weight  loss.    7.  Dilated aortic root.  Found incidentally on an echocardiogram.  We will follow-up on this with an echocardiogram, next one planned for October. Counseled on precautions.    8. Edema. JVP and IVC normal. Likely venous insufficiency.        Thank you for allowing me to participate in the care of this patient. Reviewed correspondance from weight management and neurology.  Reviewed interim labs.  I recommended starting more regular exercise.  I will plan to see him back in October as previously scheduled with an echocardiogram.   Please do not hesitate to contact me with any questions or concerns.    Sincerely,    Declan Gray MD  5/21/2024

## 2024-05-21 NOTE — TELEPHONE ENCOUNTER
SD- Pt agreeable to appt today at 11:30am   TY so much!     SR- FYI regarding add-on for today at 11:30am

## 2024-10-02 NOTE — PROGRESS NOTES
"   Cardiology Note          HPI   Sudheer Mayorga is a 79 y.o. male who presents for a follow-up as well as concerns of hand tingling.    He is a pleasant 79 y.o. with a history of prediabetes, dyslipidemia, sleep apnea on CPAP and obesity who initially presented to me September 2020 to establish care and advice on reducing cardiovascular risk.  At that present time he did not formally exercise. He tells me he is battled weight loss his adult life.  He is tried numerous diets with only modest success before he stops.  He had recently got up to 295 pounds and improved his eating to come down to 285 pounds at our initial visit.  His weight has fluctuated between 275 and 295 pounds in recent years.  He has considered bariatric surgery and asked my opinion on this.  We discussed in all out effort short of this prior to considering but that could potentially be an option even at his age.  He has chronic tendinitis of his right knee but feels it would not prevent him from doing a daily walking program.  He also recognizes it may improve from losing weight. I previously had him see Dr. Garcia and he lost 40 pounds but gained much of it back.     Since his last visit in May, since shoulder surgery for torn rotator cuff in June he has been undergoing PT and has started taking medical gummies. Helping with pain but has \"the munchies\".   He denies chest pain, shortness of breath at rest, palpitations, orthopnea, paroxysmal nocturnal dyspnea, presyncope, syncope.        Past Medical History:   Diagnosis Date    Allergic rhinitis     Asthma     Depression     Knee tendinitis     right    Lipid disorder     HONEY on CPAP     Prediabetes      Past Surgical History   Procedure Laterality Date    Hernia repair      Rotator cuff repair Left 02/15/2024    shoulder       SOCIAL HISTORY  Social History     Tobacco Use    Smoking status: Never    Smokeless tobacco: Never   Vaping Use    Vaping status: Never Used   Substance Use Topics    " Alcohol use: Yes     Comment: social    Drug use: Yes     Types: Marijuana     Comment: gummies      4 daughters ages 39-45 as of . .  He works as a .    Family Status   Relation Name Status    Bio Mother   at age 91        CVA at 88    Bio Father   at age 92        DM onset 81yo   No partnership data on file        ALLERGIES  Chocolate flavor      Outpatient Encounter Medications as of 10/8/2024:     albuterol HFA (VENTOLIN HFA) 90 mcg/actuation inhaler, INHALE 1 PUFF BY MOUTH EVERY 4 HOURS AS NEEDED    atorvastatin (LIPITOR) 20 mg tablet, Take 20 mg by mouth once daily.    BREO ELLIPTA 200-25 mcg/dose blister with device powder for inhalation, Inhale 1 puff as needed.      buPROPion XL (WELLBUTRIN XL) 150 mg 24 hr tablet, Take 1 tablet (150 mg total) by mouth every morning.    coenzyme Q10 (COQ10) 100 mg capsule, Take 100 mg by mouth daily. 20 mg    escitalopram (LEXAPRO) 10 mg tablet, Take 10 mg by mouth daily.    glucosamine/chondr hoffman A sod (OSTEO BI-FLEX ORAL), Take 1 tablet by mouth daily.    metFORMIN (GLUCOPHAGE) 500 mg tablet, Take 1,000 mg by mouth daily with breakfast.    multivit-min/folic/vit K/lycop (ONE-A-DAY MEN'S MULTIVITAMIN ORAL), Take 1 tablet by mouth daily.      nalTREXone (DEPADE) 50 mg tablet, Take 0.5 tablets (25 mg total) by mouth daily. Mid-day.  Ok to start 1/4 tab daily for 3 days, then 1/2 tab daily for 1 week, then 1/2 ab twice a day.    primidone (MYSOLINE) 50 mg tablet, TAKE 2 TABLETS BY MOUTH NIGHTLY    [DISCONTINUED] furosemide (LASIX) 20 mg tablet, Take 1 tablet (20 mg total) by mouth every other day as needed (swelling). (Patient not taking: Reported on 10/8/2024)    [DISCONTINUED] primidone (MYSOLINE) 50 mg tablet, Take 2 tablets (100 mg total) by mouth nightly.    ROS  As per HPI.  Snoring, anxiety and arthritis.  Otherwise all systems are reviewed and are negative.    Objective   Visit Vitals  /70   Pulse 62   Ht 1.829 m (6')  "  Wt 122 kg (270 lb)   SpO2 97%   BMI 36.62 kg/m²         Wt Readings from Last 3 Encounters:   10/08/24 122 kg (270 lb)   10/08/24 122 kg (268 lb)   05/21/24 122 kg (268 lb 1.6 oz)       Physical Exam  Vitals reviewed.   Constitutional:       Appearance: He is well-developed.   HENT:      Head: Normocephalic and atraumatic.   Eyes:      General: No scleral icterus.  Neck:      Vascular: No JVD.   Cardiovascular:      Rate and Rhythm: Normal rate and regular rhythm.      Heart sounds: Normal heart sounds.      Comments: 1-2+ pedal pulses, excellent (<1s) capillary refill of both feet  Pulmonary:      Breath sounds: Normal breath sounds.   Musculoskeletal:         General: Swelling: trace b/l ankle and pedal edema. Normal range of motion.   Skin:     General: Skin is warm and dry.   Neurological:      Mental Status: He is alert and oriented to person, place, and time.   Psychiatric:         Judgment: Judgment normal.         Lab Results   Component Value Date    GLUCOSE 95 05/18/2023    CALCIUM 9.2 05/18/2023     05/18/2023    K 4.3 05/18/2023    CO2 30 05/18/2023     05/18/2023    BUN 21 05/18/2023    CREATININE 0.95 05/18/2023     No results found for: \"ALT\", \"AST\", \"GGT\", \"ALKPHOS\", \"BILITOT\"  No results found for: \"WBC\", \"HGB\", \"HCT\", \"MCV\", \"PLT\"  Lab Results   Component Value Date    TSH 1.19 04/11/2022     No results found for: \"CHOL\"  No results found for: \"HDL\"  No results found for: \"LDLCALC\"  No results found for: \"TRIG\"  No results found for: \"CHOLHDL\"  No results found for: \"HGBA1C\"    Labs 5/26/2020:  Chol 141, HDL 42, trig 79, LDL 83    Labs 9/1/2020:  BUN 18, creat 0.92, na 141, k4.3, normal LFTs. TSH 1.56. VocZ5k=1.9.     Labs 8/26/2021:  Glucose 113, BUN 21, creatinine 0.95, sodium 141, potassium 4.5, liver function test are normal, hemoglobin A1c 5.8, cholesterol 148, HDL 51, triglycerides 58, LDL 84.        Labs January 23, 2024:  Glucose 89, BUN 16, creatinine 0.93, sodium 140, " Potassium 4.2, hemoglobin 13.9.    Labs 8/2024  Creat 0.99, BUN 22, K 4.3, Na 138  Hgb 14.6, Hct 43.3, Plt 180  A1C 6.2  , LDL 78, TG 71     EKG    Performed on 10/8/24 and personally reviewed:  Sinus  Rhythm at 62 bpm  Left axis deviation   Pulmonary disease pattern      Imaging/Testing    CT coronary calcium February 2016:  Coronary calcium score of 28 located in the LAD.  For age, gender and ethnicity this is the 27th percentile for risk.    PFTs March 2020:  Mild restriction    Sleep Study 2018  AHI 72.    Echocardiogram November 23, 2021:  Normal-sized LV. Preserved LV systolic function. Estimated EF 70- 75%. No regional wall motion abnormalities. Normal LV wall thickness.  Tricuspid aortic valve. Sclerotic aortic valve leaflets. Aortic root sclerotic. Mild dilatation of the aortic root.3.9cm  Mild mitral annular calcification. Trace mitral valve regurgitation. Normal-sized LA.  Normal-sized RV. Normal RV systolic function. Mild tricuspid valve regurgitation. Estimated RVSP = 29 mmHg. Normal-sized RA.  Normal-sized IVC. IVC collapses >50% during inspiration.    Echo May 23, 2023:    Technically difficult study.    Left Ventricle: Normal ventricle size. Normal wall thickness. Preserved systolic function. Estimated EF 70%. No regional wall motion abnormalities. Normal diastolic filling pattern for age.    Aorta: Aortic root sclerotic. Dilatation of the ascending aorta with diameter 4.1 cm.    Aortic Valve: Tricuspid valve.  Sclerotic leaflets. No regurgitation. No stenosis.    Tricuspid Valve: Mild regurgitation.    Pulmonic Valve: No regurgitation.    Mitral Valve: Mild regurgitation with estimated right ventricular systolic pressure 29 mmHg.    IVC/SVC: Inferior vena cava not well visualized.    Pericardium: No evidence of pericardial effusion.    Right Ventricle: Normal ventricle size. Normal systolic function.    Left Atrium: Normal sized atrium.    Right Atrium: Normal sized atrium.    Compared with  November 2021, there has been a slight interval increase in aortic root diameter of 0.2 cm. Otherwise no significant change.     TTE 10/8/2024     Technically difficult study.    Left Ventricle: Normal ventricle size. Mild concentric left ventricular hypertrophy. Preserved systolic function. Estimated EF 70%. No regional wall motion abnormalities. Normal diastolic filling pattern for age.    Aorta: Aortic root sclerotic. Dilatation of the ascending aorta with diameter 4.1 cm.    Aortic Valve: Tricuspid valve.  Sclerotic leaflets. Mild regurgitation. No stenosis.    Tricuspid Valve: Mild regurgitation. Estimated RVSP = 25 mmHg.    Pulmonic Valve: No regurgitation.    Mitral Valve: Mild regurgitation with estimated right ventricular systolic pressure 29 mmHg.    IVC/SVC: Inferior vena cava not well visualized.    Pericardium: No evidence of pericardial effusion.    Right Ventricle: Normal ventricle size. Normal systolic function.    Left Atrium: Normal sized atrium.    Right Atrium: Normal sized atrium.    Compared with May 2023, no signiicant change.    I personally reviewed results with him in the office today.    ASSESSMENT AND PLAN.    1. Cardiovascular exam. His cardiovascular exam including heart and carotids are normal today. His lipids are acceptable on Atorvastatin as is his A1C on Metformin. We discussed weight loss would go a long way in reducing his risk factors for CV disease. Due to good activity tolerance without CV symptoms I have not ordered stress testing for now. Echo today no regional wall abnormalities.    2.  Coronary atherosclerosis.  He had coronary calcium noted on a coronary calcium CAT scan in 2016.  His burden was still less than average for his age.  Nonetheless we should target an LDL of less than 100-on atorvastatin, LDL 78 (8/2024).    3. BMI 36 We discussed at prior visit how much of his medical issues would be eliminated or improved with weight loss including his prediabetes,  dyslipidemia and sleep apnea. His weight has generally been 275-295 pounds in the last several years.  He lost 40 pounds with Dr. Garcia, but unfortunately gained 25 pounds back in the summer. He lost 30 pounds with Nutrisystem diet but gained it back.      4. Dyslipidemia. Well controlled on Atorvatstatin 20 mg Daily.  Would target LDL of 100 or less, LDL 78 (8/2024)    5. HONEY. Reports AHI went from 72 to 7 with treatment.  Follows with Dr. Hercules.  Inconsistant with CPAP. We discussed benefits.    6. Prediabtes. Controlled with Metformin. We discussed weight loss.    7.  Dilated aortic root.  Found incidentally on an echocardiogram.  Measuring stable at 4.1 cm per echo 10/8/24.  Will follow with yearly echocardiograms.  Counseled on precautions.    8. Edema. JVP and IVC normal. Likely venous insufficiency.        By signing my name below, I, Marybeth Banerjee, attest that this documentation has been prepared under the direction and in the presence of MD Chriss Smith Kathleen J, CRNP  10/8/2024       Thank you for allowing me to participate in the care of this patient. Reviewed his echocardiogram  I recommended starting more regular exercise. We ordered a lipid panel to be done with is next lab draw. We also ordered an echo to be done with his next office visit in 1 year.   Please do not hesitate to contact me with any questions or concerns.    Sincerely,    Declan Gray MD  10/8/2024

## 2024-10-04 RX ORDER — PRIMIDONE 50 MG/1
100 TABLET ORAL NIGHTLY
Qty: 180 TABLET | Refills: 3 | Status: SHIPPED | OUTPATIENT
Start: 2024-10-04 | End: 2025-02-06 | Stop reason: SDUPTHER

## 2024-10-08 ENCOUNTER — HOSPITAL ENCOUNTER (OUTPATIENT)
Dept: CARDIOLOGY | Facility: HOSPITAL | Age: 79
Discharge: HOME | End: 2024-10-08
Attending: INTERNAL MEDICINE
Payer: MEDICARE

## 2024-10-08 ENCOUNTER — OFFICE VISIT (OUTPATIENT)
Dept: CARDIOLOGY | Facility: CLINIC | Age: 79
End: 2024-10-08
Payer: MEDICARE

## 2024-10-08 VITALS
SYSTOLIC BLOOD PRESSURE: 120 MMHG | DIASTOLIC BLOOD PRESSURE: 64 MMHG | HEIGHT: 72 IN | BODY MASS INDEX: 36.3 KG/M2 | WEIGHT: 268 LBS

## 2024-10-08 VITALS
OXYGEN SATURATION: 97 % | SYSTOLIC BLOOD PRESSURE: 120 MMHG | DIASTOLIC BLOOD PRESSURE: 70 MMHG | HEIGHT: 72 IN | WEIGHT: 270 LBS | HEART RATE: 62 BPM | BODY MASS INDEX: 36.57 KG/M2

## 2024-10-08 DIAGNOSIS — I77.810 DILATED AORTIC ROOT (CMS/HCC): ICD-10-CM

## 2024-10-08 DIAGNOSIS — G47.33 OSA ON CPAP: ICD-10-CM

## 2024-10-08 DIAGNOSIS — E78.2 MIXED HYPERLIPIDEMIA: ICD-10-CM

## 2024-10-08 DIAGNOSIS — I25.10 ATHEROSCLEROSIS OF CORONARY ARTERY OF NATIVE HEART WITHOUT ANGINA PECTORIS, UNSPECIFIED VESSEL OR LESION TYPE: Primary | ICD-10-CM

## 2024-10-08 LAB
AORTIC ROOT ANNULUS - M-MODE: 3.7 CM
ASCENDING AORTA: 3.5 CM
ATRIAL RATE: 62
AV PEAK GRADIENT: 8 MMHG
AV PEAK VELOCITY-S: 1.45 M/S
AV VALVE AREA: 2.11 CM2
BSA FOR ECHO PROCEDURE: 2.49 M2
CUSP SEPARATION: 2.2 CM
DOP CALC LVOT STROKE VOLUME: 81.64 CM3
E WAVE DECELERATION TIME: 265 MS
E/A RATIO: 0.7
E/E' RATIO: 10.4
E/LAT E' RATIO: 10.5
EDV (BP): 93.9 CM3
EF (A4C): 71.6 %
EF A2C: 57.8 %
EJECTION FRACTION: 63.8 %
EST RIGHT VENT SYSTOLIC PRESSURE BY TRICUSPID REGURGITATION JET: 25 MMHG
ESV (BP): 34 CM3
FRACTIONAL SHORTENING: 41.18 %
INTERVENTRICULAR SEPTUM: 1.2 CM
LA ESV (BP): 52.6 CM3
LA ESV INDEX (A2C): 18.47 CM3/M2
LA ESV INDEX (BP): 21.12 CM3/M2
LAAS-AP2: 19.5 CM2
LAAS-AP4: 20.9 CM2
LAD 2D: 3.6 CM
LALD A4C: 6.09 CM
LALD A4C: 6.69 CM
LAV-S: 46 CM3
LEFT ATRIUM VOLUME INDEX: 22.13 CM3/M2
LEFT ATRIUM VOLUME: 55.1 CM3
LEFT INTERNAL DIMENSION IN SYSTOLE: 3 CM (ref 5.43–8.24)
LEFT VENTRICLE DIASTOLIC VOLUME INDEX: 43.78 CM3/M2
LEFT VENTRICLE DIASTOLIC VOLUME: 109 CM3
LEFT VENTRICLE SYSTOLIC VOLUME INDEX: 12.45 CM3/M2
LEFT VENTRICLE SYSTOLIC VOLUME: 31 CM3
LEFT VENTRICULAR INTERNAL DIMENSION IN DIASTOLE: 5.1 CM (ref 9.49–13.18)
LEFT VENTRICULAR POSTERIOR WALL IN END DIASTOLE: 1.3 CM (ref 1.04–1.95)
LV DIASTOLIC VOLUME: 80.8 CM3
LV ESV (APICAL 2 CHAMBER): 34 CM3
LVAD-AP2: 28.8 CM2
LVAD-AP4: 33.9 CM2
LVAS-AP2: 17.5 CM2
LVAS-AP4: 15.9 CM2
LVEDVI(A2C): 32.45 CM3/M2
LVEDVI(BP): 37.71 CM3/M2
LVESVI(A2C): 13.65 CM3/M2
LVESVI(BP): 13.65 CM3/M2
LVLD-AP2: 8.54 CM
LVLD-AP4: 8.58 CM
LVLS-AP2: 7.6 CM
LVLS-AP4: 6.88 CM
LVOT 2D: 2 CM
LVOT A: 3.14 CM2
LVOT MG: 3 MMHG
LVOT MV: 0.85 M/S
LVOT PEAK VELOCITY: 1.24 M/S
LVOT PG: 6 MMHG
LVOT STROKE VOLUME INDEX: 32.79 ML/M2
LVOT VTI: 26 CM
MLH CV ECHO AVA INDEX VELOCITY RATIO: 0.8
MV E'TISSUE VEL-LAT: 0.07 M/S
MV E'TISSUE VEL-MED: 0.07 M/S
MV PEAK A VEL: 0.97 M/S
MV PEAK E VEL: 0.72 M/S
MV STENOSIS PRESSURE HALF TIME: 78 MS
MV VALVE AREA P 1/2 METHOD: 2.82 CM2
P AXIS: 41
POSTERIOR WALL: 1.3 CM
PR INTERVAL: 178
QRS DURATION: 90
QT INTERVAL: 422
QTC CALCULATION(BAZETT): 428
R AXIS: -36
RAP: 3 MMHG
RVOT VMAX: 0.85 M/S
RVOT VTI: 20.2 CM
SEPTAL TISSUE DOPPLER FREE WALL LATE DIA VELOCITY (APICAL 4 CHAMBER VIEW): 0.14 M/S
T WAVE AXIS: 59
TR MAX PG: 21.9 MMHG
TRICUSPID VALVE PEAK REGURGITATION VELOCITY: 2.34 M/S
VENTRICULAR RATE: 62
Z-SCORE OF LEFT VENTRICULAR DIMENSION IN END DIASTOLE: -7.85
Z-SCORE OF LEFT VENTRICULAR DIMENSION IN END SYSTOLE: -6.34
Z-SCORE OF LEFT VENTRICULAR POSTERIOR WALL IN END DIASTOLE: -0.46

## 2024-10-08 PROCEDURE — 93000 ELECTROCARDIOGRAM COMPLETE: CPT | Performed by: INTERNAL MEDICINE

## 2024-10-08 PROCEDURE — 93306 TTE W/DOPPLER COMPLETE: CPT | Mod: 26 | Performed by: INTERNAL MEDICINE

## 2024-10-08 PROCEDURE — 93306 TTE W/DOPPLER COMPLETE: CPT

## 2024-10-08 PROCEDURE — 99214 OFFICE O/P EST MOD 30 MIN: CPT | Performed by: INTERNAL MEDICINE

## 2024-10-08 RX ORDER — METFORMIN HYDROCHLORIDE 500 MG/1
1000 TABLET ORAL
COMMUNITY
End: 2024-11-18

## 2024-10-08 NOTE — LETTER
"October 8, 2024     Kamran Wilson MD  [address]  [address]  [place] [address]    Patient: Sudheer Mayorga  YOB: 1945  Date of Visit: 10/8/2024      Dear Dr. Wilson:    Thank you for referring Sudheer Mayorga to me for evaluation. Below are my notes for this consultation.    If you have questions, please do not hesitate to call me. I look forward to following your patient along with you.         Sincerely,        Declan Gray MD        CC: MD Chriss Horan Kathleen J, CRNP  10/8/2024 11:20 AM  Sign when Signing Visit     Cardiology Note          HPI   Sudheer Mayorga is a 79 y.o. male who presents for a follow-up as well as concerns of hand tingling.    He is a pleasant 79 y.o. with a history of prediabetes, dyslipidemia, sleep apnea on CPAP and obesity who initially presented to me September 2020 to establish care and advice on reducing cardiovascular risk.  At that present time he did not formally exercise. He tells me he is battled weight loss his adult life.  He is tried numerous diets with only modest success before he stops.  He had recently got up to 295 pounds and improved his eating to come down to 285 pounds at our initial visit.  His weight has fluctuated between 275 and 295 pounds in recent years.  He has considered bariatric surgery and asked my opinion on this.  We discussed in all out effort short of this prior to considering but that could potentially be an option even at his age.  He has chronic tendinitis of his right knee but feels it would not prevent him from doing a daily walking program.  He also recognizes it may improve from losing weight. I previously had him see Dr. Garcia and he lost 40 pounds but gained much of it back.     Since his last visit in May, since shoulder surgery for torn rotator cuff in June he has been undergoing PT and has started taking medical gummies. Helping with pain but has \"the munchies\".   He denies chest pain, " shortness of breath at rest, palpitations, orthopnea, paroxysmal nocturnal dyspnea, presyncope, syncope.        Past Medical History:   Diagnosis Date   • Allergic rhinitis    • Asthma    • Depression    • Knee tendinitis     right   • Lipid disorder    • HONEY on CPAP    • Prediabetes      Past Surgical History   Procedure Laterality Date   • Hernia repair     • Rotator cuff repair Left 02/15/2024    shoulder       SOCIAL HISTORY  Social History     Tobacco Use   • Smoking status: Never   • Smokeless tobacco: Never   Vaping Use   • Vaping status: Never Used   Substance Use Topics   • Alcohol use: Yes     Comment: social   • Drug use: Yes     Types: Marijuana     Comment: gummies      4 daughters ages 39-45 as of . .  He works as a .    Family Status   Relation Name Status   • Bio Mother   at age 91        CVA at 88   • Bio Father   at age 92        DM onset 81yo   No partnership data on file        ALLERGIES  Chocolate flavor      Outpatient Encounter Medications as of 10/8/2024:   •  albuterol HFA (VENTOLIN HFA) 90 mcg/actuation inhaler, INHALE 1 PUFF BY MOUTH EVERY 4 HOURS AS NEEDED  •  atorvastatin (LIPITOR) 20 mg tablet, Take 20 mg by mouth once daily.  •  BREO ELLIPTA 200-25 mcg/dose blister with device powder for inhalation, Inhale 1 puff as needed.    •  buPROPion XL (WELLBUTRIN XL) 150 mg 24 hr tablet, Take 1 tablet (150 mg total) by mouth every morning.  •  coenzyme Q10 (COQ10) 100 mg capsule, Take 100 mg by mouth daily. 20 mg  •  escitalopram (LEXAPRO) 10 mg tablet, Take 10 mg by mouth daily.  •  glucosamine/chondr hoffman A sod (OSTEO BI-FLEX ORAL), Take 1 tablet by mouth daily.  •  metFORMIN (GLUCOPHAGE) 500 mg tablet, Take 1,000 mg by mouth daily with breakfast.  •  multivit-min/folic/vit K/lycop (ONE-A-DAY MEN'S MULTIVITAMIN ORAL), Take 1 tablet by mouth daily.    •  nalTREXone (DEPADE) 50 mg tablet, Take 0.5 tablets (25 mg total) by mouth daily. Mid-day.  Ok to  "start 1/4 tab daily for 3 days, then 1/2 tab daily for 1 week, then 1/2 ab twice a day.  •  primidone (MYSOLINE) 50 mg tablet, TAKE 2 TABLETS BY MOUTH NIGHTLY  •  [DISCONTINUED] furosemide (LASIX) 20 mg tablet, Take 1 tablet (20 mg total) by mouth every other day as needed (swelling). (Patient not taking: Reported on 10/8/2024)  •  [DISCONTINUED] primidone (MYSOLINE) 50 mg tablet, Take 2 tablets (100 mg total) by mouth nightly.    ROS  As per HPI.  Snoring, anxiety and arthritis.  Otherwise all systems are reviewed and are negative.    Objective   Visit Vitals  /70   Pulse 62   Ht 1.829 m (6')   Wt 122 kg (270 lb)   SpO2 97%   BMI 36.62 kg/m²         Wt Readings from Last 3 Encounters:   10/08/24 122 kg (270 lb)   10/08/24 122 kg (268 lb)   05/21/24 122 kg (268 lb 1.6 oz)       Physical Exam  Vitals reviewed.   Constitutional:       Appearance: He is well-developed.   HENT:      Head: Normocephalic and atraumatic.   Eyes:      General: No scleral icterus.  Neck:      Vascular: No JVD.   Cardiovascular:      Rate and Rhythm: Normal rate and regular rhythm.      Heart sounds: Normal heart sounds.      Comments: 1-2+ pedal pulses, excellent (<1s) capillary refill of both feet  Pulmonary:      Breath sounds: Normal breath sounds.   Musculoskeletal:         General: Swelling: trace b/l ankle and pedal edema. Normal range of motion.   Skin:     General: Skin is warm and dry.   Neurological:      Mental Status: He is alert and oriented to person, place, and time.   Psychiatric:         Judgment: Judgment normal.         Lab Results   Component Value Date    GLUCOSE 95 05/18/2023    CALCIUM 9.2 05/18/2023     05/18/2023    K 4.3 05/18/2023    CO2 30 05/18/2023     05/18/2023    BUN 21 05/18/2023    CREATININE 0.95 05/18/2023     No results found for: \"ALT\", \"AST\", \"GGT\", \"ALKPHOS\", \"BILITOT\"  No results found for: \"WBC\", \"HGB\", \"HCT\", \"MCV\", \"PLT\"  Lab Results   Component Value Date    TSH 1.19 04/11/2022 " "    No results found for: \"CHOL\"  No results found for: \"HDL\"  No results found for: \"LDLCALC\"  No results found for: \"TRIG\"  No results found for: \"CHOLHDL\"  No results found for: \"HGBA1C\"    Labs 5/26/2020:  Chol 141, HDL 42, trig 79, LDL 83    Labs 9/1/2020:  BUN 18, creat 0.92, na 141, k4.3, normal LFTs. TSH 1.56. XfiC1b=2.9.     Labs 8/26/2021:  Glucose 113, BUN 21, creatinine 0.95, sodium 141, potassium 4.5, liver function test are normal, hemoglobin A1c 5.8, cholesterol 148, HDL 51, triglycerides 58, LDL 84.        Labs January 23, 2024:  Glucose 89, BUN 16, creatinine 0.93, sodium 140, Potassium 4.2, hemoglobin 13.9.    Labs 8/2024  Creat 0.99, BUN 22, K 4.3, Na 138  Hgb 14.6, Hct 43.3, Plt 180  A1C 6.2  , LDL 78, TG 71     EKG    Performed on 10/8/24 and personally reviewed:  Sinus  Rhythm at 62 bpm  Left axis deviation   Pulmonary disease pattern      Imaging/Testing    CT coronary calcium February 2016:  Coronary calcium score of 28 located in the LAD.  For age, gender and ethnicity this is the 27th percentile for risk.    PFTs March 2020:  Mild restriction    Sleep Study 2018  AHI 72.    Echocardiogram November 23, 2021:  Normal-sized LV. Preserved LV systolic function. Estimated EF 70- 75%. No regional wall motion abnormalities. Normal LV wall thickness.  Tricuspid aortic valve. Sclerotic aortic valve leaflets. Aortic root sclerotic. Mild dilatation of the aortic root.3.9cm  Mild mitral annular calcification. Trace mitral valve regurgitation. Normal-sized LA.  Normal-sized RV. Normal RV systolic function. Mild tricuspid valve regurgitation. Estimated RVSP = 29 mmHg. Normal-sized RA.  Normal-sized IVC. IVC collapses >50% during inspiration.    Echo May 23, 2023:  •  Technically difficult study.  •  Left Ventricle: Normal ventricle size. Normal wall thickness. Preserved systolic function. Estimated EF 70%. No regional wall motion abnormalities. Normal diastolic filling pattern for age.  •  Aorta: " Aortic root sclerotic. Dilatation of the ascending aorta with diameter 4.1 cm.  •  Aortic Valve: Tricuspid valve.  Sclerotic leaflets. No regurgitation. No stenosis.  •  Tricuspid Valve: Mild regurgitation.  •  Pulmonic Valve: No regurgitation.  •  Mitral Valve: Mild regurgitation with estimated right ventricular systolic pressure 29 mmHg.  •  IVC/SVC: Inferior vena cava not well visualized.  •  Pericardium: No evidence of pericardial effusion.  •  Right Ventricle: Normal ventricle size. Normal systolic function.  •  Left Atrium: Normal sized atrium.  •  Right Atrium: Normal sized atrium.  •  Compared with November 2021, there has been a slight interval increase in aortic root diameter of 0.2 cm. Otherwise no significant change.     TTE 10/8/2024   •  Technically difficult study.  •  Left Ventricle: Normal ventricle size. Mild concentric left ventricular hypertrophy. Preserved systolic function. Estimated EF 70%. No regional wall motion abnormalities. Normal diastolic filling pattern for age.  •  Aorta: Aortic root sclerotic. Dilatation of the ascending aorta with diameter 4.1 cm.  •  Aortic Valve: Tricuspid valve.  Sclerotic leaflets. Mild regurgitation. No stenosis.  •  Tricuspid Valve: Mild regurgitation. Estimated RVSP = 25 mmHg.  •  Pulmonic Valve: No regurgitation.  •  Mitral Valve: Mild regurgitation with estimated right ventricular systolic pressure 29 mmHg.  •  IVC/SVC: Inferior vena cava not well visualized.  •  Pericardium: No evidence of pericardial effusion.  •  Right Ventricle: Normal ventricle size. Normal systolic function.  •  Left Atrium: Normal sized atrium.  •  Right Atrium: Normal sized atrium.  •  Compared with May 2023, no signiicant change.  •  I personally reviewed results with him in the office today.    ASSESSMENT AND PLAN.    1. Cardiovascular exam. His cardiovascular exam including heart and carotids are normal today. His lipids are acceptable on Atorvastatin as is his A1C on Metformin.  We discussed weight loss would go a long way in reducing his risk factors for CV disease. Due to good activity tolerance without CV symptoms I have not ordered stress testing for now. Echo today no regional wall abnormalities.    2.  Coronary atherosclerosis.  He had coronary calcium noted on a coronary calcium CAT scan in 2016.  His burden was still less than average for his age.  Nonetheless we should target an LDL of less than 100-on atorvastatin, LDL 78 (8/2024).    3. BMI 36 We discussed at prior visit how much of his medical issues would be eliminated or improved with weight loss including his prediabetes, dyslipidemia and sleep apnea. His weight has generally been 275-295 pounds in the last several years.  He lost 40 pounds with Dr. Garcia, but unfortunately gained 25 pounds back in the summer. He lost 30 pounds with Nutrisystem diet but gained it back.      4. Dyslipidemia. Well controlled on Atorvatstatin 20 mg Daily.  Would target LDL of 100 or less, LDL 78 (8/2024)    5. HONEY. Reports AHI went from 72 to 7 with treatment.  Follows with Dr. Hercules.  Inconsistant with CPAP. We discussed benefits.    6. Prediabtes. Controlled with Metformin. We discussed weight loss.    7.  Dilated aortic root.  Found incidentally on an echocardiogram.  Measuring stable at 4.1 cm per echo 10/8/24.  Will follow with yearly echocardiograms.  Counseled on precautions.    8. Edema. JVP and IVC normal. Likely venous insufficiency.        By signing my name below, I, Marybeth Banerjee, attest that this documentation has been prepared under the direction and in the presence of MD Chriss Smith Kathleen J, CRNP  10/8/2024       Thank you for allowing me to participate in the care of this patient. Reviewed his echocardiogram  I recommended starting more regular exercise. We ordered a lipid panel to be done with is next lab draw. We also ordered an echo to be done with his next office visit in 1 year.   Please  do not hesitate to contact me with any questions or concerns.    Sincerely,    Declan Gray MD  10/8/2024

## 2024-10-23 ENCOUNTER — TELEPHONE (OUTPATIENT)
Dept: SCHEDULING | Facility: CLINIC | Age: 79
End: 2024-10-23
Payer: MEDICARE

## 2024-10-23 NOTE — TELEPHONE ENCOUNTER
Pt would like to email [facility] results to Dr Gray and is asking for an email address    Pt can be reached at [phone]

## 2024-11-18 ENCOUNTER — OFFICE VISIT (OUTPATIENT)
Dept: NEUROLOGY | Facility: CLINIC | Age: 79
End: 2024-11-18
Payer: MEDICARE

## 2024-11-18 VITALS — SYSTOLIC BLOOD PRESSURE: 128 MMHG | OXYGEN SATURATION: 95 % | HEART RATE: 91 BPM | DIASTOLIC BLOOD PRESSURE: 60 MMHG

## 2024-11-18 DIAGNOSIS — R25.1 TREMOR: Primary | ICD-10-CM

## 2024-11-18 PROCEDURE — 99213 OFFICE O/P EST LOW 20 MIN: CPT | Performed by: PSYCHIATRY & NEUROLOGY

## 2024-11-18 ASSESSMENT — PAIN SCALES - GENERAL: PAINLEVEL_OUTOF10: 8

## 2024-11-18 NOTE — PROGRESS NOTES
Sudheer Mayorga is a 79 y.o. male  11/18/2024  Kamran Wilson MD    Neurology Follow Up Note    Subjective     Sudheer Mayorga is a 79 y.o. male who is being evaluated  for tremor.  I previously saw the patient 1 year ago.  At that time he was doing well on primidone and I recommended no changes in his management.    Since his last visit, the patient reported that things have been going very well.  He is now taking primidone 100 mg at night.  He is tolerating the medication well without any side effects.  His tremor remains dramatically less frequent and severe.  He notices the tremor mostly when he holds onto an object tightly.  He has otherwise been unable to identify trigger for the tremor or factors that make it better or worse.  He really has no difficulty performing all of his activities of daily living and his tremor is more of a nuisance which does not impact his quality of life in any meaningful way.    The patient reported that he uses a CPAP to sleep.  He would like to start taking Wegovy for weight loss however has concerns regarding thyroid cancer.  He had left rotator cuff surgery and remains in physical therapy once a week.  In general he has been eating and sleeping well and his mood is good.  There were no symptoms to suggest increased intracranial pressure, meningitis or systemic illness.  He has otherwise had no episodes of transient or static neurologic dysfunction.    Review of Systems  Constitutional: negative  Eyes: negative  Ears, nose, mouth, throat, and face: negative  Respiratory: negative  Cardiovascular: negative  Gastrointestinal: negative  Genitourinary:negative  Integument/breast: negative  Hematologic/lymphatic: negative  Musculoskeletal:negative  Neurological: negative  Behavioral/Psych: negative  Endocrine: negative  Allergic/Immunologic: negative    Current Outpatient Medications   Medication Sig Dispense Refill    albuterol HFA (VENTOLIN HFA) 90 mcg/actuation inhaler INHALE 1 PUFF  BY MOUTH EVERY 4 HOURS AS NEEDED      atorvastatin (LIPITOR) 20 mg tablet Take 20 mg by mouth once daily.      BREO ELLIPTA 200-25 mcg/dose blister with device powder for inhalation Inhale 1 puff as needed.        coenzyme Q10 (COQ10) 100 mg capsule Take 100 mg by mouth daily. 20 mg      escitalopram (LEXAPRO) 10 mg tablet Take 10 mg by mouth daily.      glucosamine/chondr hoffman A sod (OSTEO BI-FLEX ORAL) Take 1 tablet by mouth daily.      multivit-min/folic/vit K/lycop (ONE-A-DAY MEN'S MULTIVITAMIN ORAL) Take 1 tablet by mouth daily.        primidone (MYSOLINE) 50 mg tablet TAKE 2 TABLETS BY MOUTH NIGHTLY 180 tablet 3     No current facility-administered medications for this visit.       PMH/SH/FH : Unchanged since previous visit.    Objective     Physical Exam  Visit Vitals  /60 (BP Location: Left upper arm, Patient Position: Sitting)   Pulse 91   SpO2 95%       General Appearance:  Alert, no distress, appears stated age  Mental status was normal  The cranial nerves were normal  There was normal bulk and tone  There was a subtle tremor  There was no parkinsonism  Strength was 5/5  The sensory examination was normal  Reflexes were symmetric  The gait was narrow-based         Problem List Items Addressed This Visit          Other    Tremor - Primary    Current Assessment & Plan     The patient has a benign essential tremor.  There is no evidence of parkinsonism or Parkinson's disease.  He is doing well on primidone 100 mg nightly.  He is tolerating medication well without any side effects.  At this time no additional diagnostics or treatments are indicated.  I encouraged the patient to remain as physically active as possible.            It was a real pleasure treating Sudheer Mayorga today, thank you for allowing me to participate in the medical care. If you have any questions, please call me at any time. Sudheer Mayorga will follow up with me in the coming weeks to months and keep me updated by telephone. Sudheer  Mukesh knows to notify me immediately if there is any change in the condition or if there are any new symptoms of transient or static neurologic dysfunction.    Jose D Heard MD

## 2024-11-18 NOTE — ASSESSMENT & PLAN NOTE
The patient has a benign essential tremor.  There is no evidence of parkinsonism or Parkinson's disease.  He is doing well on primidone 100 mg nightly.  He is tolerating medication well without any side effects.  At this time no additional diagnostics or treatments are indicated.  I encouraged the patient to remain as physically active as possible.

## 2024-11-18 NOTE — LETTER
November 18, 2024     Kamran Wilson MD  [address]  [place]  [facility] [address]    Patient: Sudheer Mayorga  YOB: 1945  Date of Visit: 11/18/2024      Dear Dr. Wilson:    Thank you for referring Sudheer Mayorga to me for evaluation. Below are my notes for this consultation.    If you have questions, please do not hesitate to call me. I look forward to following your patient along with you.         Sincerely,        Jose D Heard MD        CC: No Recipients    Jose D Heard MD  11/18/2024  7:57 AM  Sign when Signing Visit  Sudheer Mayorga is a 79 y.o. male  11/18/2024  Kamran Wilson MD    Neurology Follow Up Note    Subjective    Sudheer Mayorga is a 79 y.o. male who is being evaluated  for tremor.  I previously saw the patient 1 year ago.  At that time he was doing well on primidone and I recommended no changes in his management.    Since his last visit, the patient reported that things have been going very well.  He is now taking primidone 100 mg at night.  He is tolerating the medication well without any side effects.  His tremor remains dramatically less frequent and severe.  He notices the tremor mostly when he holds onto an object tightly.  He has otherwise been unable to identify trigger for the tremor or factors that make it better or worse.  He really has no difficulty performing all of his activities of daily living and his tremor is more of a nuisance which does not impact his quality of life in any meaningful way.    The patient reported that he uses a CPAP to sleep.  He would like to start taking Wegovy for weight loss however has concerns regarding thyroid cancer.  He had left rotator cuff surgery and remains in physical therapy once a week.  In general he has been eating and sleeping well and his mood is good.  There were no symptoms to suggest increased intracranial pressure, meningitis or systemic illness.  He has otherwise had no episodes of transient or static  neurologic dysfunction.    Review of Systems  Constitutional: negative  Eyes: negative  Ears, nose, mouth, throat, and face: negative  Respiratory: negative  Cardiovascular: negative  Gastrointestinal: negative  Genitourinary:negative  Integument/breast: negative  Hematologic/lymphatic: negative  Musculoskeletal:negative  Neurological: negative  Behavioral/Psych: negative  Endocrine: negative  Allergic/Immunologic: negative    Current Outpatient Medications   Medication Sig Dispense Refill   • albuterol HFA (VENTOLIN HFA) 90 mcg/actuation inhaler INHALE 1 PUFF BY MOUTH EVERY 4 HOURS AS NEEDED     • atorvastatin (LIPITOR) 20 mg tablet Take 20 mg by mouth once daily.     • BREO ELLIPTA 200-25 mcg/dose blister with device powder for inhalation Inhale 1 puff as needed.       • coenzyme Q10 (COQ10) 100 mg capsule Take 100 mg by mouth daily. 20 mg     • escitalopram (LEXAPRO) 10 mg tablet Take 10 mg by mouth daily.     • glucosamine/chondr hoffman A sod (OSTEO BI-FLEX ORAL) Take 1 tablet by mouth daily.     • multivit-min/folic/vit K/lycop (ONE-A-DAY MEN'S MULTIVITAMIN ORAL) Take 1 tablet by mouth daily.       • primidone (MYSOLINE) 50 mg tablet TAKE 2 TABLETS BY MOUTH NIGHTLY 180 tablet 3     No current facility-administered medications for this visit.       PMH/SH/FH : Unchanged since previous visit.    Objective    Physical Exam  Visit Vitals  /60 (BP Location: Left upper arm, Patient Position: Sitting)   Pulse 91   SpO2 95%       General Appearance:  Alert, no distress, appears stated age  Mental status was normal  The cranial nerves were normal  There was normal bulk and tone  There was a subtle tremor  There was no parkinsonism  Strength was 5/5  The sensory examination was normal  Reflexes were symmetric  The gait was narrow-based         Problem List Items Addressed This Visit          Other    Tremor - Primary    Current Assessment & Plan     The patient has a benign essential tremor.  There is no evidence of  parkinsonism or Parkinson's disease.  He is doing well on primidone 100 mg nightly.  He is tolerating medication well without any side effects.  At this time no additional diagnostics or treatments are indicated.  I encouraged the patient to remain as physically active as possible.            It was a real pleasure treating Sudheer Mayorga today, thank you for allowing me to participate in the medical care. If you have any questions, please call me at any time. Sudheer Mayorga will follow up with me in the coming weeks to months and keep me updated by telephone. Sudheer Mayorga knows to notify me immediately if there is any change in the condition or if there are any new symptoms of transient or static neurologic dysfunction.    Jose D Heard MD

## 2025-02-06 ENCOUNTER — TELEPHONE (OUTPATIENT)
Dept: NEUROLOGY | Facility: CLINIC | Age: 80
End: 2025-02-06

## 2025-02-06 RX ORDER — PRIMIDONE 50 MG/1
150 TABLET ORAL NIGHTLY
Qty: 270 TABLET | Refills: 3 | Status: SHIPPED | OUTPATIENT
Start: 2025-02-06 | End: 2025-03-11 | Stop reason: SDUPTHER

## 2025-02-06 NOTE — TELEPHONE ENCOUNTER
Reason for call: medication question     Patient provider:Jose D Heard MD    Callback: Yes or No Yes     Action needed to resolve: Patient calling about his medication primidone (MYSOLINE) 50 mg and states it is losing its effect and wanted to see if the dosage could be increased. Please advise.

## 2025-03-11 ENCOUNTER — TELEPHONE (OUTPATIENT)
Dept: NEUROLOGY | Facility: CLINIC | Age: 80
End: 2025-03-11
Payer: MEDICARE

## 2025-03-11 RX ORDER — PRIMIDONE 50 MG/1
200 TABLET ORAL NIGHTLY
Qty: 120 TABLET | Refills: 3 | Status: SHIPPED | OUTPATIENT
Start: 2025-03-11 | End: 2025-05-05 | Stop reason: SDUPTHER

## 2025-03-11 NOTE — TELEPHONE ENCOUNTER
Pt wants to discuss with Dr Heard if his medication dosage should be changed again. He isn't scheduled again until November so is asking for a phone call

## 2025-03-28 ENCOUNTER — TELEPHONE (OUTPATIENT)
Dept: SCHEDULING | Facility: CLINIC | Age: 80
End: 2025-03-28
Payer: MEDICARE

## 2025-03-28 NOTE — TELEPHONE ENCOUNTER
Pt would like to make a f.u with . states this is a concern visit and did not provide further detail. Declined triage nurse. Just wanted to schedule an appt as soon as possible. Please advise    Ty!      P:[phone]

## 2025-03-31 NOTE — TELEPHONE ENCOUNTER
Pt called requests a call back regarding scheduling an appt as soon as possible    Pt can be reached at [phone]

## 2025-05-05 ENCOUNTER — OFFICE VISIT (OUTPATIENT)
Dept: NEUROLOGY | Facility: CLINIC | Age: 80
End: 2025-05-05
Payer: MEDICARE

## 2025-05-05 VITALS — DIASTOLIC BLOOD PRESSURE: 77 MMHG | SYSTOLIC BLOOD PRESSURE: 128 MMHG | HEART RATE: 82 BPM | OXYGEN SATURATION: 95 %

## 2025-05-05 DIAGNOSIS — R25.1 TREMOR: Primary | ICD-10-CM

## 2025-05-05 PROCEDURE — 99214 OFFICE O/P EST MOD 30 MIN: CPT | Performed by: PSYCHIATRY & NEUROLOGY

## 2025-05-05 RX ORDER — PRIMIDONE 50 MG/1
250 TABLET ORAL NIGHTLY
Qty: 150 TABLET | Refills: 3 | Status: SHIPPED | OUTPATIENT
Start: 2025-05-05

## 2025-05-14 ENCOUNTER — TELEPHONE (OUTPATIENT)
Dept: NEUROLOGY | Facility: CLINIC | Age: 80
End: 2025-05-14
Payer: MEDICARE

## 2025-05-19 ENCOUNTER — TELEPHONE (OUTPATIENT)
Dept: SCHEDULING | Facility: CLINIC | Age: 80
End: 2025-05-19
Payer: MEDICARE

## 2025-05-19 DIAGNOSIS — E66.811 CLASS 1 OBESITY WITH SERIOUS COMORBIDITY AND BODY MASS INDEX (BMI) OF 33.0 TO 33.9 IN ADULT, UNSPECIFIED OBESITY TYPE: ICD-10-CM

## 2025-05-19 DIAGNOSIS — E78.2 MIXED HYPERLIPIDEMIA: Primary | ICD-10-CM

## 2025-06-05 ENCOUNTER — TELEPHONE (OUTPATIENT)
Facility: HOSPITAL | Age: 80
End: 2025-06-05
Payer: MEDICARE

## 2025-06-05 NOTE — TELEPHONE ENCOUNTER
Pt calling stating in was walking down [address] and accidentally got dizzy, lost his balance and fell back on his head and then he walk again and it happen again he wants to speak to the doctor directly.

## 2025-06-12 LAB
CHOLEST SERPL-MCNC: 110 MG/DL
CHOLEST/HDLC SERPL: 3.2 (CALC)
HDLC SERPL-MCNC: 34 MG/DL
LDLC SERPL CALC-MCNC: 60 MG/DL (CALC)
NONHDLC SERPL-MCNC: 76 MG/DL (CALC)
TRIGL SERPL-MCNC: 76 MG/DL

## 2025-06-16 ENCOUNTER — RESULTS FOLLOW-UP (OUTPATIENT)
Dept: CARDIOLOGY | Facility: CLINIC | Age: 80
End: 2025-06-16

## 2025-06-17 ENCOUNTER — TELEPHONE (OUTPATIENT)
Facility: HOSPITAL | Age: 80
End: 2025-06-17
Payer: MEDICARE

## 2025-06-17 NOTE — TELEPHONE ENCOUNTER
Patient is requesting a geoffrey back from the Provider in regards to a discussion during the previous visit.  Contact # [phone].

## 2025-07-16 ENCOUNTER — TELEPHONE (OUTPATIENT)
Dept: SCHEDULING | Facility: CLINIC | Age: 80
End: 2025-07-16
Payer: MEDICARE

## 2025-07-16 NOTE — TELEPHONE ENCOUNTER
Pt of Dr Gray    PMH includes CAD, DL, HONEY on CPAP, prediabetes, dilated aortic root    Pt is requesting a return call from MD himself to discuss concerns r/t intermittent LT hand cramps, reporting that his middle and index fingers become  and stiff    Pt reports that this happened a few times a week or two ago and again last night    Pt also reporting to intermittent B/L LE cramps for which he has been taking OTC magnesium supplements for. Makes mention that he was at a  this date and outside for a bit, leg cramps developed then resolved upon going into the air conditioning    Pt denies any current cardiac sx    Assures he is hydrating well    Reports to being recently diagnosed w/ RA by rheumatologist    Reports also to start of Wegovy - has received two injections to date    Advised pt that his sx sound similar to trigger finger sx and to reach out to his rheumatologist to discuss further    Pt still wishes to speak with MD    Please reach pt at [phone]

## 2025-07-16 NOTE — TELEPHONE ENCOUNTER
JPi: Pls see below and advise since Dr. Gray if on vacation. He's asking to speak w/ a provider directly. TY

## 2025-07-21 ENCOUNTER — TELEPHONE (OUTPATIENT)
Dept: CARDIOLOGY | Facility: CLINIC | Age: 80
End: 2025-07-21
Payer: MEDICARE

## 2025-07-21 DIAGNOSIS — E78.2 MIXED HYPERLIPIDEMIA: Primary | ICD-10-CM

## 2025-07-21 DIAGNOSIS — R25.2 HAND CRAMP: Primary | ICD-10-CM

## 2025-07-21 DIAGNOSIS — R25.2 LEG CRAMP: ICD-10-CM

## 2025-07-23 ENCOUNTER — TELEPHONE (OUTPATIENT)
Facility: HOSPITAL | Age: 80
End: 2025-07-23
Payer: MEDICARE

## 2025-07-23 NOTE — TELEPHONE ENCOUNTER
Question or Concern: Pt states he is experiencing spasms in left hand, started x 3 weeks ago but it becoming more persistent. Pt requested message be sent to provider